# Patient Record
Sex: FEMALE | Race: WHITE | Employment: OTHER | URBAN - METROPOLITAN AREA
[De-identification: names, ages, dates, MRNs, and addresses within clinical notes are randomized per-mention and may not be internally consistent; named-entity substitution may affect disease eponyms.]

---

## 2018-08-29 ENCOUNTER — HOSPITAL ENCOUNTER (OUTPATIENT)
Dept: VASCULAR SURGERY | Age: 68
Discharge: HOME | End: 2018-08-29
Attending: FAMILY MEDICINE
Payer: MEDICARE

## 2018-08-29 DIAGNOSIS — H93.11 NEW ONSET TINNITUS, RIGHT: ICD-10-CM

## 2018-08-29 PROCEDURE — 93880 EXTRACRANIAL BILAT STUDY: CPT

## 2018-08-29 NOTE — PROCEDURES
[facility]  *** FINAL REPORT ***    Name: Jenny Lima  MRN: [id number]    Outpatient  : 1950  HIS Order #: [id number]  [address] [facility] Visit #: [id number]  Date: 29 Aug 2018    TYPE OF TEST: Cerebrovascular Duplex    REASON FOR TEST  Tinnitus    Right Carotid:-             Proximal               Mid                 Distal  cm/s  Systolic  Diastolic  Systolic  Diastolic  Systolic  Diastolic  CCA:     68.7       9.0                            61.0      12.0  Bulb:  ECA:     97.0       0.0  ICA:    321.0      88.0      286.0      55.0       98.0      18.0  ICA/CCA:  5.3       7.3    ICA Stenosis: 70% or greater    Right Vertebral:-  Finding: Antegrade  Sys:       45.0  Jailyn:        0.0    Right Subclavian: Normal    Left Carotid:-            Proximal                Mid                 Distal  cm/s  Systolic  Diastolic  Systolic  Diastolic  Systolic  Diastolic  CCA:     67.7      10.0                            69.0      14.0  Bulb:  ECA:    149.0       0.0  ICA:     67.0      12.0       60.0      11.0       48.0      12.0  ICA/CCA:  1.0       0.9    ICA Stenosis: <50%    Left Vertebral:-  Finding: Antegrade  Sys:       51.0  Jailyn:       13.0    Left Subclavian: Normal    INTERPRETATION/FINDINGS  PROCEDURE:  Carotid Duplex Examination using B-mode, color and  spectral Doppler of the extracranial cerebrovascular arteries. 1. 70% or greater stenosis in the right internal carotid artery. 2. <50% stenosis in the left internal carotid artery. 3. No significant stenosis in the external carotid arteries  bilaterally. 4. Antegrade flow in both vertebral arteries. 5. Normal flow in both subclavian arteries. Plaque Morphology:  1. Calcific plaque in the bulb and right ICA. 2. Calcific plaque in the bulb and left ICA. ADDITIONAL COMMENTS    I have personally reviewed the data relevant to the interpretation of  this  study. TECHNOLOGIST: Bunny Pavon RVT, JACK  Signed: 2018 02:30 PM    PHYSICIAN: Shahriar Bowen MD  Signed: 09/04/2018 01:53 PM

## 2018-08-29 NOTE — PROGRESS NOTES
[id number] [facility]  Preliminary Report:  Carotid Duplex Scan Right: 
Severe plaque noted in the right carotid system. Right ICA velocities suggest greater than or equal to 70% diameter reduction. Right vertebral artery flow is antegrade. Left: 
Mild plaque noted in the left carotid system. Left ICA velocities suggest less than 50% diameter reduction. Left vertebral artery flow is antegrade. Final report to follow.

## 2018-09-24 ENCOUNTER — HOSPITAL ENCOUNTER (OUTPATIENT)
Dept: CT IMAGING | Age: 68
Discharge: HOME | End: 2018-09-24
Attending: SURGERY
Payer: MEDICARE

## 2018-09-24 DIAGNOSIS — I65.23 BILATERAL CAROTID ARTERY OCCLUSION: ICD-10-CM

## 2018-09-24 LAB — CREAT BLD-MCNC: 0.8 MG/DL (ref 0.6–1.3)

## 2018-09-24 PROCEDURE — 70498 CT ANGIOGRAPHY NECK: CPT

## 2018-09-24 PROCEDURE — 74011250636 HC RX REV CODE- 250/636: Performed by: SURGERY

## 2018-09-24 PROCEDURE — 74011636320 HC RX REV CODE- 636/320: Performed by: SURGERY

## 2018-09-24 PROCEDURE — 82565 ASSAY OF CREATININE: CPT

## 2018-09-24 RX ORDER — SODIUM CHLORIDE 0.9 % (FLUSH) 0.9 %
10 SYRINGE (ML) INJECTION
Status: COMPLETED | OUTPATIENT
Start: 2018-09-24 | End: 2018-09-24

## 2018-09-24 RX ORDER — SODIUM CHLORIDE 9 MG/ML
50 INJECTION, SOLUTION INTRAVENOUS
Status: COMPLETED | OUTPATIENT
Start: 2018-09-24 | End: 2018-09-24

## 2018-09-24 RX ADMIN — Medication 10 ML: at 07:18

## 2018-09-24 RX ADMIN — IOPAMIDOL 100 ML: 755 INJECTION, SOLUTION INTRAVENOUS at 07:18

## 2018-09-24 RX ADMIN — SODIUM CHLORIDE 50 ML/HR: 900 INJECTION, SOLUTION INTRAVENOUS at 07:18

## 2018-10-05 ENCOUNTER — HOSPITAL ENCOUNTER (OUTPATIENT)
Dept: GENERAL RADIOLOGY | Age: 68
Discharge: HOME | End: 2018-10-05
Attending: SURGERY
Payer: MEDICARE

## 2018-10-05 ENCOUNTER — HOSPITAL ENCOUNTER (OUTPATIENT)
Dept: PREADMISSION TESTING | Age: 68
Discharge: HOME | End: 2018-10-05
Attending: SURGERY
Payer: MEDICARE

## 2018-10-05 LAB
ABO + RH BLD: NORMAL
ALBUMIN SERPL-MCNC: 4 G/DL (ref 3.5–5)
ALBUMIN/GLOB SERPL: 1.3 {RATIO} (ref 1.1–2.2)
ALP SERPL-CCNC: 76 U/L (ref 45–117)
ALT SERPL-CCNC: 28 U/L (ref 12–78)
ANION GAP SERPL CALC-SCNC: 7 MMOL/L (ref 5–15)
APPEARANCE UR: CLEAR
APTT PPP: 27 SEC (ref 22.1–32)
AST SERPL-CCNC: 18 U/L (ref 15–37)
BACTERIA URNS QL MICRO: NEGATIVE /HPF
BASOPHILS # BLD: 0 K/UL (ref 0–0.1)
BASOPHILS NFR BLD: 0 % (ref 0–1)
BILIRUB SERPL-MCNC: 0.3 MG/DL (ref 0.2–1)
BILIRUB UR QL: NEGATIVE
BLOOD GROUP ANTIBODIES SERPL: NORMAL
BUN SERPL-MCNC: 12 MG/DL (ref 6–20)
BUN/CREAT SERPL: 16 (ref 12–20)
CALCIUM SERPL-MCNC: 9.2 MG/DL (ref 8.5–10.1)
CHLORIDE SERPL-SCNC: 105 MMOL/L (ref 97–108)
CO2 SERPL-SCNC: 27 MMOL/L (ref 21–32)
COLOR UR: NORMAL
CREAT SERPL-MCNC: 0.77 MG/DL (ref 0.55–1.02)
DIFFERENTIAL METHOD BLD: ABNORMAL
EOSINOPHIL # BLD: 0.1 K/UL (ref 0–0.4)
EOSINOPHIL NFR BLD: 1 % (ref 0–7)
EPITH CASTS URNS QL MICRO: NORMAL /LPF
ERYTHROCYTE [DISTWIDTH] IN BLOOD BY AUTOMATED COUNT: 12.8 % (ref 11.5–14.5)
GLOBULIN SER CALC-MCNC: 3.1 G/DL (ref 2–4)
GLUCOSE SERPL-MCNC: 111 MG/DL (ref 65–100)
GLUCOSE UR STRIP.AUTO-MCNC: NEGATIVE MG/DL
HCT VFR BLD AUTO: 42.1 % (ref 35–47)
HGB BLD-MCNC: 14 G/DL (ref 11.5–16)
HGB UR QL STRIP: NEGATIVE
HYALINE CASTS URNS QL MICRO: NORMAL /LPF (ref 0–5)
IMM GRANULOCYTES # BLD: 0 K/UL (ref 0–0.04)
IMM GRANULOCYTES NFR BLD AUTO: 1 % (ref 0–0.5)
INR PPP: 1 (ref 0.9–1.1)
KETONES UR QL STRIP.AUTO: NEGATIVE MG/DL
LEUKOCYTE ESTERASE UR QL STRIP.AUTO: NEGATIVE
LYMPHOCYTES # BLD: 2.7 K/UL (ref 0.8–3.5)
LYMPHOCYTES NFR BLD: 34 % (ref 12–49)
MCH RBC QN AUTO: 28.6 PG (ref 26–34)
MCHC RBC AUTO-ENTMCNC: 33.3 G/DL (ref 30–36.5)
MCV RBC AUTO: 85.9 FL (ref 80–99)
MONOCYTES # BLD: 0.5 K/UL (ref 0–1)
MONOCYTES NFR BLD: 6 % (ref 5–13)
NEUTS SEG # BLD: 4.5 K/UL (ref 1.8–8)
NEUTS SEG NFR BLD: 58 % (ref 32–75)
NITRITE UR QL STRIP.AUTO: NEGATIVE
NRBC # BLD: 0 K/UL (ref 0–0.01)
NRBC BLD-RTO: 0 PER 100 WBC
PH UR STRIP: 5.5 [PH] (ref 5–8)
PLATELET # BLD AUTO: 269 K/UL (ref 150–400)
PMV BLD AUTO: 9.7 FL (ref 8.9–12.9)
POTASSIUM SERPL-SCNC: 4.1 MMOL/L (ref 3.5–5.1)
PROT SERPL-MCNC: 7.1 G/DL (ref 6.4–8.2)
PROT UR STRIP-MCNC: NEGATIVE MG/DL
PROTHROMBIN TIME: 10.1 SEC (ref 9–11.1)
RBC # BLD AUTO: 4.9 M/UL (ref 3.8–5.2)
RBC #/AREA URNS HPF: NORMAL /HPF (ref 0–5)
SODIUM SERPL-SCNC: 139 MMOL/L (ref 136–145)
SP GR UR REFRACTOMETRY: 1 (ref 1–1.03)
SPECIMEN EXP DATE BLD: NORMAL
THERAPEUTIC RANGE,PTTT: NORMAL SECS (ref 58–77)
UA: UC IF INDICATED,UAUC: NORMAL
UROBILINOGEN UR QL STRIP.AUTO: 0.2 EU/DL (ref 0.2–1)
WBC # BLD AUTO: 7.8 K/UL (ref 3.6–11)
WBC URNS QL MICRO: NORMAL /HPF (ref 0–4)

## 2018-10-05 PROCEDURE — 85730 THROMBOPLASTIN TIME PARTIAL: CPT | Performed by: SURGERY

## 2018-10-05 PROCEDURE — 80053 COMPREHEN METABOLIC PANEL: CPT | Performed by: SURGERY

## 2018-10-05 PROCEDURE — 86900 BLOOD TYPING SEROLOGIC ABO: CPT | Performed by: SURGERY

## 2018-10-05 PROCEDURE — 85025 COMPLETE CBC W/AUTO DIFF WBC: CPT | Performed by: SURGERY

## 2018-10-05 PROCEDURE — 71046 X-RAY EXAM CHEST 2 VIEWS: CPT

## 2018-10-05 PROCEDURE — 85610 PROTHROMBIN TIME: CPT | Performed by: SURGERY

## 2018-10-05 PROCEDURE — 81001 URINALYSIS AUTO W/SCOPE: CPT | Performed by: SURGERY

## 2018-10-05 PROCEDURE — 93005 ELECTROCARDIOGRAM TRACING: CPT

## 2018-10-05 PROCEDURE — 36415 COLL VENOUS BLD VENIPUNCTURE: CPT | Performed by: SURGERY

## 2018-10-05 RX ORDER — FLUTICASONE PROPIONATE 50 MCG
2 SPRAY, SUSPENSION (ML) NASAL AS NEEDED
COMMUNITY

## 2018-10-05 RX ORDER — CEFAZOLIN SODIUM/WATER 2 G/20 ML
2 SYRINGE (ML) INTRAVENOUS ONCE
Status: CANCELLED | OUTPATIENT
Start: 2018-10-06 | End: 2018-10-06

## 2018-10-05 RX ORDER — SODIUM CHLORIDE, SODIUM LACTATE, POTASSIUM CHLORIDE, CALCIUM CHLORIDE 600; 310; 30; 20 MG/100ML; MG/100ML; MG/100ML; MG/100ML
25 INJECTION, SOLUTION INTRAVENOUS CONTINUOUS
Status: CANCELLED | OUTPATIENT
Start: 2018-10-11

## 2018-10-05 RX ORDER — ASPIRIN 81 MG/1
81 TABLET ORAL DAILY
COMMUNITY

## 2018-10-05 NOTE — PERIOP NOTES
Incentive Chloé Irwin Using the incentive spirometer helps expand the small air sacs of your lungs, helps you breathe deeply, and helps improve your lung function. Use your incentive spirometer twice a day (10 breaths each time) prior to surgery. How to Use Your Incentive Spirometer: 1. Hold the incentive spirometer in an upright position. 2. Breathe out as usual.  
3. Place the mouthpiece in your mouth and seal your lips tightly around it. 4. Take a deep breath. Breathe in slowly and as deeply as possible. Keep the blue flow rate guide between the arrows. 5. Hold your breath as long as possible. Then exhale slowly and allow the piston to fall to the bottom of the column. 6. Rest for a few seconds and repeat steps one through five at least 10 times. PAT Tidal Volume_____1700_____________  x______10__________  Date_____10/5/18__________________ BRING THE INCENTIVE SPIROMETER WITH YOU TO THE HOSPITAL ON THE DAY OF YOUR SURGERY. Opportunity given to ask and answer questions as well as to observe return demonstration. Patient signature_____________________________    Witness____________________________

## 2018-10-05 NOTE — PERIOP NOTES
[facility] Preoperative Instructions Surgery Date 10/11/18         Time of Arrival 0545 am   Contact # [phone] 1. On the day of your surgery, please report to the Surgical Services Registration Desk and sign in at your designated time. The Surgery Center is located to the right of the Emergency Room. 2. You must have someone with you to drive you home. You should not drive a car for 24 hours following surgery. Please make arrangements for a friend or family member to stay with you for the first 24 hours after your surgery. 3. Do not have anything to eat or drink (including water, gum, mints, coffee, juice) after midnight ??10/10/18    . ? This may not apply to medications prescribed by your physician. ?(Please note below the special instructions with medications to take the morning of your procedure.) 4. We recommend you do not drink any alcoholic beverages for 24 hours before and after your surgery. 5. Contact your surgeons office for instructions on the following medications: non-steroidal anti-inflammatory drugs (i.e. Advil, Aleve), vitamins, and supplements. (Some surgeons will want you to stop these medications prior to surgery and others may allow you to take them) **If you are currently taking Plavix, Coumadin, Aspirin and/or other blood-thinning agents, contact your surgeon for instructions. ** Your surgeon will partner with the physician prescribing these medications to determine if it is safe to stop or if you need to continue taking. Please do not stop taking these medications without instructions from your surgeon 6. Wear comfortable clothes. Wear glasses instead of contacts. Do not bring any money or jewelry. Please bring picture ID, insurance card, and any prearranged co-payment or hospital payment. Do not wear make-up, particularly mascara the morning of your surgery.   Do not wear nail polish, particularly if you are having foot /hand surgery. Wear your hair loose or down, no ponytails, buns, ulysses pins or clips. All body piercings must be removed. Please shower with antibacterial soap for three consecutive days before and on the morning of surgery, but do not apply any lotions, powders or deodorants after the shower on the day of surgery. Please use a fresh towels after each shower. Please sleep in clean clothes and change bed linens the night before surgery. Please do not shave for 48 hours prior to surgery. Shaving of the face is acceptable. 7. You should understand that if you do not follow these instructions your surgery may be cancelled. If your physical condition changes (I.e. fever, cold or flu) please contact your surgeon as soon as possible. 8. It is important that you be on time. If a situation occurs where you may be late, please call [phone] (OR Holding Area). 9. If you have any questions and or problems, please call [phone] (Pre-admission Testing). 10. Your surgery time may be subject to change. You will receive a phone call the evening prior if your time changes. 11.  If having outpatient surgery, you must have someone to drive you here, stay with you during the duration of your stay, and to drive you home at time of discharge. 12.   In an effort to improve the efficiency, privacy, and safety for all of our Pre-op patients visitors are not allowed in the Holding area. Once you arrive and are registered your family/visitors will be asked to remain in the waiting room. The Pre-op staff will get you from the Surgical Waiting Area and will explain to you and your family/visitors that the Pre-op phase is beginning. The staff will answer any questions and provide instructions for tracking of the patient, by use of the existing tracking number and color-coded status board in the waiting room.   At this time the staff will also ask for your designated spokesperson information in the event that the physician or staff need to provide an update or obtain any pertinent information. The designated spokesperson will be notified if the physician needs to speak to family during the pre-operative phase. If at any time your family/visitors has questions or concerns they may approach the volunteer desk in the waiting area for assistance. Special Instructions: Use Incentive Spirometer 20 times daily prior to surgery. MEDICATIONS TO TAKE THE MORNING OF SURGERY WITH A SIP OF WATER: amlodipine, Zyrtec, Flonase if needed, Levothyroxine, Metoprolol I understand a pre-operative phone call will be made to verify my surgery time. In the event that I am not available, I give permission for a message to be left on my answering service and/or with another person? yes 
 
 
 
 ___________________      __________   _________ 
  (Signature of Patient)             (Witness)                (Date and Time)

## 2018-10-06 LAB
ATRIAL RATE: 63 BPM
CALCULATED P AXIS, ECG09: 80 DEGREES
CALCULATED R AXIS, ECG10: -9 DEGREES
CALCULATED T AXIS, ECG11: 105 DEGREES
DIAGNOSIS, 93000: NORMAL
P-R INTERVAL, ECG05: 160 MS
Q-T INTERVAL, ECG07: 390 MS
QRS DURATION, ECG06: 74 MS
QTC CALCULATION (BEZET), ECG08: 399 MS
VENTRICULAR RATE, ECG03: 63 BPM

## 2018-10-08 VITALS
TEMPERATURE: 98.2 F | HEART RATE: 65 BPM | DIASTOLIC BLOOD PRESSURE: 69 MMHG | SYSTOLIC BLOOD PRESSURE: 169 MMHG | HEIGHT: 67 IN | BODY MASS INDEX: 23.94 KG/M2 | OXYGEN SATURATION: 99 % | RESPIRATION RATE: 16 BRPM | WEIGHT: 152.56 LBS

## 2018-10-11 ENCOUNTER — ANESTHESIA EVENT (OUTPATIENT)
Dept: SURGERY | Age: 68
DRG: 039 | End: 2018-10-11
Payer: MEDICARE

## 2018-10-11 ENCOUNTER — ANESTHESIA (OUTPATIENT)
Dept: SURGERY | Age: 68
DRG: 039 | End: 2018-10-11
Payer: MEDICARE

## 2018-10-11 ENCOUNTER — HOSPITAL ENCOUNTER (INPATIENT)
Age: 68
LOS: 1 days | Discharge: HOME | DRG: 039 | End: 2018-10-12
Attending: SURGERY | Admitting: SURGERY
Payer: MEDICARE

## 2018-10-11 PROBLEM — I65.29 CAROTID STENOSIS, ASYMPTOMATIC: Status: ACTIVE | Noted: 2018-10-11

## 2018-10-11 LAB
GLUCOSE BLD STRIP.AUTO-MCNC: 156 MG/DL (ref 65–100)
GLUCOSE BLD STRIP.AUTO-MCNC: 182 MG/DL (ref 65–100)
GLUCOSE BLD STRIP.AUTO-MCNC: 199 MG/DL (ref 65–100)
GLUCOSE BLD STRIP.AUTO-MCNC: 237 MG/DL (ref 65–100)
SERVICE CMNT-IMP: ABNORMAL

## 2018-10-11 PROCEDURE — 74011250637 HC RX REV CODE- 250/637: Performed by: SURGERY

## 2018-10-11 PROCEDURE — 76060000037 HC ANESTHESIA 3 TO 3.5 HR: Performed by: SURGERY

## 2018-10-11 PROCEDURE — 77030006689 HC BLD OPHTH BVR BD -A: Performed by: SURGERY

## 2018-10-11 PROCEDURE — 74011000250 HC RX REV CODE- 250: Performed by: SURGERY

## 2018-10-11 PROCEDURE — 77030002987 HC SUT PROL J&J -B: Performed by: SURGERY

## 2018-10-11 PROCEDURE — 77030014008 HC SPNG HEMSTAT J&J -C: Performed by: SURGERY

## 2018-10-11 PROCEDURE — 77030031139 HC SUT VCRL2 J&J -A: Performed by: SURGERY

## 2018-10-11 PROCEDURE — 76210000019 HC OR PH I REC 4 TO 4.5 HR: Performed by: SURGERY

## 2018-10-11 PROCEDURE — 77030010120 HC SHR COAG HARMO J&J -E: Performed by: SURGERY

## 2018-10-11 PROCEDURE — 65660000000 HC RM CCU STEPDOWN

## 2018-10-11 PROCEDURE — 74011250636 HC RX REV CODE- 250/636

## 2018-10-11 PROCEDURE — 77030013079 HC BLNKT BAIR HGGR 3M -A: Performed by: NURSE ANESTHETIST, CERTIFIED REGISTERED

## 2018-10-11 PROCEDURE — 76010000173 HC OR TIME 3 TO 3.5 HR INTENSV-TIER 1: Performed by: SURGERY

## 2018-10-11 PROCEDURE — 82962 GLUCOSE BLOOD TEST: CPT

## 2018-10-11 PROCEDURE — 74011250636 HC RX REV CODE- 250/636: Performed by: SURGERY

## 2018-10-11 PROCEDURE — 77030002996 HC SUT SLK J&J -A: Performed by: SURGERY

## 2018-10-11 PROCEDURE — 03UK0JZ SUPPLEMENT RIGHT INTERNAL CAROTID ARTERY WITH SYNTHETIC SUBSTITUTE, OPEN APPROACH: ICD-10-PCS | Performed by: SURGERY

## 2018-10-11 PROCEDURE — 74011250636 HC RX REV CODE- 250/636: Performed by: ANESTHESIOLOGY

## 2018-10-11 PROCEDURE — 03CK0Z6 EXTIRPATE MATTER FROM R INT CAROTID, BIFURC, OPEN: ICD-10-PCS | Performed by: SURGERY

## 2018-10-11 PROCEDURE — C1768 GRAFT, VASCULAR: HCPCS | Performed by: SURGERY

## 2018-10-11 PROCEDURE — 77030002916 HC SUT ETHLN J&J -A: Performed by: SURGERY

## 2018-10-11 PROCEDURE — 74011636637 HC RX REV CODE- 636/637: Performed by: SURGERY

## 2018-10-11 PROCEDURE — 77030002924 HC SUT GORTX WLGO -B: Performed by: SURGERY

## 2018-10-11 PROCEDURE — 77030013567 HC DRN WND RESERV BARD -A: Performed by: SURGERY

## 2018-10-11 PROCEDURE — 77010033678 HC OXYGEN DAILY

## 2018-10-11 PROCEDURE — 77030019908 HC STETH ESOPH SIMS -A: Performed by: NURSE ANESTHETIST, CERTIFIED REGISTERED

## 2018-10-11 PROCEDURE — 77030005401 HC CATH RAD ARRO -A: Performed by: NURSE ANESTHETIST, CERTIFIED REGISTERED

## 2018-10-11 PROCEDURE — 77030018836 HC SOL IRR NACL ICUM -A: Performed by: SURGERY

## 2018-10-11 PROCEDURE — 77030011640 HC PAD GRND REM COVD -A: Performed by: SURGERY

## 2018-10-11 PROCEDURE — 74011000258 HC RX REV CODE- 258

## 2018-10-11 PROCEDURE — 77030008684 HC TU ET CUF COVD -B: Performed by: NURSE ANESTHETIST, CERTIFIED REGISTERED

## 2018-10-11 PROCEDURE — 74011000250 HC RX REV CODE- 250

## 2018-10-11 PROCEDURE — 77030020782 HC GWN BAIR PAWS FLX 3M -B

## 2018-10-11 PROCEDURE — 77030020256 HC SOL INJ NACL 0.9%  500ML: Performed by: SURGERY

## 2018-10-11 PROCEDURE — 77030012406 HC DRN WND PENRS BARD -A: Performed by: SURGERY

## 2018-10-11 PROCEDURE — 77030026438 HC STYL ET INTUB CARD -A: Performed by: NURSE ANESTHETIST, CERTIFIED REGISTERED

## 2018-10-11 PROCEDURE — 74011000272 HC RX REV CODE- 272: Performed by: SURGERY

## 2018-10-11 PROCEDURE — 88311 DECALCIFY TISSUE: CPT | Performed by: SURGERY

## 2018-10-11 PROCEDURE — 77030018824 HC SHNT CAR ARGY COVD -B: Performed by: SURGERY

## 2018-10-11 PROCEDURE — 74011636637 HC RX REV CODE- 636/637

## 2018-10-11 PROCEDURE — 88304 TISSUE EXAM BY PATHOLOGIST: CPT | Performed by: SURGERY

## 2018-10-11 PROCEDURE — 77030020153 HC PRB DOPLR DISP MIZU -C: Performed by: SURGERY

## 2018-10-11 PROCEDURE — 77030002933 HC SUT MCRYL J&J -A: Performed by: SURGERY

## 2018-10-11 DEVICE — GRAFT PTCH TW GEL SEAL 8X75MM -- VASCUTEK FLUOROPASSIV: Type: IMPLANTABLE DEVICE | Site: CAROTID | Status: FUNCTIONAL

## 2018-10-11 RX ORDER — ROCURONIUM BROMIDE 10 MG/ML
INJECTION, SOLUTION INTRAVENOUS AS NEEDED
Status: DISCONTINUED | OUTPATIENT
Start: 2018-10-11 | End: 2018-10-11 | Stop reason: HOSPADM

## 2018-10-11 RX ORDER — NICARDIPINE HYDROCHLORIDE 0.2 MG/ML
INJECTION INTRAVENOUS
Status: DISCONTINUED | OUTPATIENT
Start: 2018-10-11 | End: 2018-10-11 | Stop reason: HOSPADM

## 2018-10-11 RX ORDER — PRAVASTATIN SODIUM 40 MG/1
40 TABLET ORAL
Status: DISCONTINUED | OUTPATIENT
Start: 2018-10-11 | End: 2018-10-12 | Stop reason: HOSPADM

## 2018-10-11 RX ORDER — ONDANSETRON 2 MG/ML
4 INJECTION INTRAMUSCULAR; INTRAVENOUS
Status: DISCONTINUED | OUTPATIENT
Start: 2018-10-11 | End: 2018-10-12 | Stop reason: HOSPADM

## 2018-10-11 RX ORDER — FENTANYL CITRATE 50 UG/ML
25 INJECTION, SOLUTION INTRAMUSCULAR; INTRAVENOUS
Status: DISCONTINUED | OUTPATIENT
Start: 2018-10-11 | End: 2018-10-11 | Stop reason: HOSPADM

## 2018-10-11 RX ORDER — HYDROMORPHONE HYDROCHLORIDE 1 MG/ML
.2-.5 INJECTION, SOLUTION INTRAMUSCULAR; INTRAVENOUS; SUBCUTANEOUS
Status: DISCONTINUED | OUTPATIENT
Start: 2018-10-11 | End: 2018-10-11 | Stop reason: HOSPADM

## 2018-10-11 RX ORDER — HEPARIN SODIUM 1000 [USP'U]/ML
INJECTION, SOLUTION INTRAVENOUS; SUBCUTANEOUS AS NEEDED
Status: DISCONTINUED | OUTPATIENT
Start: 2018-10-11 | End: 2018-10-11 | Stop reason: HOSPADM

## 2018-10-11 RX ORDER — SODIUM CHLORIDE 0.9 % (FLUSH) 0.9 %
5-10 SYRINGE (ML) INJECTION AS NEEDED
Status: DISCONTINUED | OUTPATIENT
Start: 2018-10-11 | End: 2018-10-11 | Stop reason: HOSPADM

## 2018-10-11 RX ORDER — DEXAMETHASONE SODIUM PHOSPHATE 4 MG/ML
INJECTION, SOLUTION INTRA-ARTICULAR; INTRALESIONAL; INTRAMUSCULAR; INTRAVENOUS; SOFT TISSUE AS NEEDED
Status: DISCONTINUED | OUTPATIENT
Start: 2018-10-11 | End: 2018-10-11 | Stop reason: HOSPADM

## 2018-10-11 RX ORDER — SODIUM CHLORIDE, SODIUM LACTATE, POTASSIUM CHLORIDE, CALCIUM CHLORIDE 600; 310; 30; 20 MG/100ML; MG/100ML; MG/100ML; MG/100ML
25 INJECTION, SOLUTION INTRAVENOUS CONTINUOUS
Status: DISCONTINUED | OUTPATIENT
Start: 2018-10-11 | End: 2018-10-11 | Stop reason: HOSPADM

## 2018-10-11 RX ORDER — INSULIN LISPRO 100 [IU]/ML
INJECTION, SOLUTION INTRAVENOUS; SUBCUTANEOUS
Status: DISCONTINUED | OUTPATIENT
Start: 2018-10-11 | End: 2018-10-12 | Stop reason: HOSPADM

## 2018-10-11 RX ORDER — ASPIRIN 81 MG/1
81 TABLET ORAL 2 TIMES DAILY
Status: DISCONTINUED | OUTPATIENT
Start: 2018-10-11 | End: 2018-10-12 | Stop reason: HOSPADM

## 2018-10-11 RX ORDER — ADHESIVE BANDAGE
30 BANDAGE TOPICAL DAILY PRN
Status: DISCONTINUED | OUTPATIENT
Start: 2018-10-11 | End: 2018-10-12 | Stop reason: HOSPADM

## 2018-10-11 RX ORDER — SODIUM CHLORIDE, SODIUM LACTATE, POTASSIUM CHLORIDE, CALCIUM CHLORIDE 600; 310; 30; 20 MG/100ML; MG/100ML; MG/100ML; MG/100ML
INJECTION, SOLUTION INTRAVENOUS
Status: DISCONTINUED | OUTPATIENT
Start: 2018-10-11 | End: 2018-10-11 | Stop reason: HOSPADM

## 2018-10-11 RX ORDER — SODIUM CHLORIDE 9 MG/ML
50 INJECTION, SOLUTION INTRAVENOUS CONTINUOUS
Status: DISCONTINUED | OUTPATIENT
Start: 2018-10-11 | End: 2018-10-12

## 2018-10-11 RX ORDER — MORPHINE SULFATE 10 MG/ML
2 INJECTION, SOLUTION INTRAMUSCULAR; INTRAVENOUS
Status: DISCONTINUED | OUTPATIENT
Start: 2018-10-11 | End: 2018-10-11 | Stop reason: HOSPADM

## 2018-10-11 RX ORDER — MIDAZOLAM HYDROCHLORIDE 1 MG/ML
INJECTION, SOLUTION INTRAMUSCULAR; INTRAVENOUS AS NEEDED
Status: DISCONTINUED | OUTPATIENT
Start: 2018-10-11 | End: 2018-10-11 | Stop reason: HOSPADM

## 2018-10-11 RX ORDER — SODIUM CHLORIDE 0.9 % (FLUSH) 0.9 %
5-10 SYRINGE (ML) INJECTION EVERY 8 HOURS
Status: DISCONTINUED | OUTPATIENT
Start: 2018-10-11 | End: 2018-10-11 | Stop reason: HOSPADM

## 2018-10-11 RX ORDER — METOPROLOL TARTRATE 25 MG/1
25 TABLET, FILM COATED ORAL EVERY 12 HOURS
Status: DISCONTINUED | OUTPATIENT
Start: 2018-10-11 | End: 2018-10-12 | Stop reason: HOSPADM

## 2018-10-11 RX ORDER — FACIAL-BODY WIPES
10 EACH TOPICAL DAILY PRN
Status: DISCONTINUED | OUTPATIENT
Start: 2018-10-11 | End: 2018-10-12 | Stop reason: HOSPADM

## 2018-10-11 RX ORDER — PROPOFOL 10 MG/ML
INJECTION, EMULSION INTRAVENOUS AS NEEDED
Status: DISCONTINUED | OUTPATIENT
Start: 2018-10-11 | End: 2018-10-11 | Stop reason: HOSPADM

## 2018-10-11 RX ORDER — METFORMIN HYDROCHLORIDE 500 MG/1
1000 TABLET ORAL 2 TIMES DAILY WITH MEALS
Status: DISCONTINUED | OUTPATIENT
Start: 2018-10-11 | End: 2018-10-12 | Stop reason: HOSPADM

## 2018-10-11 RX ORDER — ONDANSETRON 2 MG/ML
INJECTION INTRAMUSCULAR; INTRAVENOUS AS NEEDED
Status: DISCONTINUED | OUTPATIENT
Start: 2018-10-11 | End: 2018-10-11 | Stop reason: HOSPADM

## 2018-10-11 RX ORDER — LIDOCAINE HYDROCHLORIDE 20 MG/ML
INJECTION, SOLUTION EPIDURAL; INFILTRATION; INTRACAUDAL; PERINEURAL AS NEEDED
Status: DISCONTINUED | OUTPATIENT
Start: 2018-10-11 | End: 2018-10-11 | Stop reason: HOSPADM

## 2018-10-11 RX ORDER — METOPROLOL TARTRATE 5 MG/5ML
INJECTION INTRAVENOUS AS NEEDED
Status: DISCONTINUED | OUTPATIENT
Start: 2018-10-11 | End: 2018-10-11 | Stop reason: HOSPADM

## 2018-10-11 RX ORDER — DEXTROSE 50 % IN WATER (D50W) INTRAVENOUS SYRINGE
12.5-25 AS NEEDED
Status: DISCONTINUED | OUTPATIENT
Start: 2018-10-11 | End: 2018-10-12 | Stop reason: HOSPADM

## 2018-10-11 RX ORDER — MORPHINE SULFATE 2 MG/ML
2 INJECTION, SOLUTION INTRAMUSCULAR; INTRAVENOUS
Status: DISCONTINUED | OUTPATIENT
Start: 2018-10-11 | End: 2018-10-12

## 2018-10-11 RX ORDER — FENTANYL CITRATE 50 UG/ML
INJECTION, SOLUTION INTRAMUSCULAR; INTRAVENOUS AS NEEDED
Status: DISCONTINUED | OUTPATIENT
Start: 2018-10-11 | End: 2018-10-11 | Stop reason: HOSPADM

## 2018-10-11 RX ORDER — SUCCINYLCHOLINE CHLORIDE 20 MG/ML
INJECTION INTRAMUSCULAR; INTRAVENOUS AS NEEDED
Status: DISCONTINUED | OUTPATIENT
Start: 2018-10-11 | End: 2018-10-11 | Stop reason: HOSPADM

## 2018-10-11 RX ORDER — CETIRIZINE HCL 10 MG
10 TABLET ORAL DAILY
Status: DISCONTINUED | OUTPATIENT
Start: 2018-10-12 | End: 2018-10-12 | Stop reason: HOSPADM

## 2018-10-11 RX ORDER — DIPHENHYDRAMINE HYDROCHLORIDE 50 MG/ML
12.5 INJECTION, SOLUTION INTRAMUSCULAR; INTRAVENOUS AS NEEDED
Status: DISCONTINUED | OUTPATIENT
Start: 2018-10-11 | End: 2018-10-11 | Stop reason: HOSPADM

## 2018-10-11 RX ORDER — AMLODIPINE BESYLATE 5 MG/1
10 TABLET ORAL DAILY
Status: DISCONTINUED | OUTPATIENT
Start: 2018-10-11 | End: 2018-10-12 | Stop reason: HOSPADM

## 2018-10-11 RX ORDER — MAGNESIUM SULFATE 100 %
4 CRYSTALS MISCELLANEOUS AS NEEDED
Status: DISCONTINUED | OUTPATIENT
Start: 2018-10-11 | End: 2018-10-12 | Stop reason: HOSPADM

## 2018-10-11 RX ORDER — GLYCOPYRROLATE 0.2 MG/ML
INJECTION INTRAMUSCULAR; INTRAVENOUS AS NEEDED
Status: DISCONTINUED | OUTPATIENT
Start: 2018-10-11 | End: 2018-10-11 | Stop reason: HOSPADM

## 2018-10-11 RX ORDER — CEFAZOLIN SODIUM/WATER 2 G/20 ML
2 SYRINGE (ML) INTRAVENOUS ONCE
Status: COMPLETED | OUTPATIENT
Start: 2018-10-11 | End: 2018-10-11

## 2018-10-11 RX ORDER — INSULIN LISPRO 100 [IU]/ML
INJECTION, SOLUTION INTRAVENOUS; SUBCUTANEOUS
Status: COMPLETED
Start: 2018-10-11 | End: 2018-10-11

## 2018-10-11 RX ORDER — ONDANSETRON 2 MG/ML
4 INJECTION INTRAMUSCULAR; INTRAVENOUS AS NEEDED
Status: DISCONTINUED | OUTPATIENT
Start: 2018-10-11 | End: 2018-10-11 | Stop reason: HOSPADM

## 2018-10-11 RX ORDER — OXYCODONE AND ACETAMINOPHEN 5; 325 MG/1; MG/1
1 TABLET ORAL
Status: DISCONTINUED | OUTPATIENT
Start: 2018-10-11 | End: 2018-10-12

## 2018-10-11 RX ADMIN — DEXAMETHASONE SODIUM PHOSPHATE 4 MG: 4 INJECTION, SOLUTION INTRA-ARTICULAR; INTRALESIONAL; INTRAMUSCULAR; INTRAVENOUS; SOFT TISSUE at 08:40

## 2018-10-11 RX ADMIN — MIDAZOLAM HYDROCHLORIDE 2 MG: 1 INJECTION, SOLUTION INTRAMUSCULAR; INTRAVENOUS at 07:24

## 2018-10-11 RX ADMIN — METOPROLOL TARTRATE 1 MG: 5 INJECTION INTRAVENOUS at 10:10

## 2018-10-11 RX ADMIN — PROPOFOL 20 MG: 10 INJECTION, EMULSION INTRAVENOUS at 08:59

## 2018-10-11 RX ADMIN — HEPARIN SODIUM 2000 UNITS: 1000 INJECTION, SOLUTION INTRAVENOUS; SUBCUTANEOUS at 08:58

## 2018-10-11 RX ADMIN — ROCURONIUM BROMIDE 40 MG: 10 INJECTION, SOLUTION INTRAVENOUS at 07:50

## 2018-10-11 RX ADMIN — PRAVASTATIN SODIUM 40 MG: 40 TABLET ORAL at 21:59

## 2018-10-11 RX ADMIN — METOPROLOL TARTRATE 25 MG: 25 TABLET ORAL at 13:44

## 2018-10-11 RX ADMIN — SODIUM CHLORIDE, SODIUM LACTATE, POTASSIUM CHLORIDE, CALCIUM CHLORIDE: 600; 310; 30; 20 INJECTION, SOLUTION INTRAVENOUS at 10:25

## 2018-10-11 RX ADMIN — ONDANSETRON 4 MG: 2 INJECTION, SOLUTION INTRAMUSCULAR; INTRAVENOUS at 13:01

## 2018-10-11 RX ADMIN — FENTANYL CITRATE 50 MCG: 50 INJECTION, SOLUTION INTRAMUSCULAR; INTRAVENOUS at 08:02

## 2018-10-11 RX ADMIN — ROCURONIUM BROMIDE 10 MG: 10 INJECTION, SOLUTION INTRAVENOUS at 08:30

## 2018-10-11 RX ADMIN — METOPROLOL TARTRATE 1 MG: 5 INJECTION INTRAVENOUS at 10:17

## 2018-10-11 RX ADMIN — METOPROLOL TARTRATE 25 MG: 25 TABLET ORAL at 21:59

## 2018-10-11 RX ADMIN — NICARDIPINE HYDROCHLORIDE 5 MG/HR: 0.2 INJECTION INTRAVENOUS at 08:58

## 2018-10-11 RX ADMIN — LIDOCAINE HYDROCHLORIDE 80 MG: 20 INJECTION, SOLUTION EPIDURAL; INFILTRATION; INTRACAUDAL; PERINEURAL at 07:40

## 2018-10-11 RX ADMIN — PROPOFOL 20 MG: 10 INJECTION, EMULSION INTRAVENOUS at 08:02

## 2018-10-11 RX ADMIN — PROPOFOL 20 MG: 10 INJECTION, EMULSION INTRAVENOUS at 08:04

## 2018-10-11 RX ADMIN — INSULIN LISPRO 2 UNITS: 100 INJECTION, SOLUTION INTRAVENOUS; SUBCUTANEOUS at 11:29

## 2018-10-11 RX ADMIN — ASPIRIN 81 MG: 81 TABLET, COATED ORAL at 13:45

## 2018-10-11 RX ADMIN — SUCCINYLCHOLINE CHLORIDE 120 MG: 20 INJECTION INTRAMUSCULAR; INTRAVENOUS at 07:41

## 2018-10-11 RX ADMIN — HEPARIN SODIUM 6000 UNITS: 1000 INJECTION, SOLUTION INTRAVENOUS; SUBCUTANEOUS at 08:30

## 2018-10-11 RX ADMIN — SODIUM CHLORIDE, SODIUM LACTATE, POTASSIUM CHLORIDE, CALCIUM CHLORIDE: 600; 310; 30; 20 INJECTION, SOLUTION INTRAVENOUS at 07:26

## 2018-10-11 RX ADMIN — Medication 2 G: at 07:50

## 2018-10-11 RX ADMIN — GLYCOPYRROLATE 0.1 MG: 0.2 INJECTION INTRAMUSCULAR; INTRAVENOUS at 08:33

## 2018-10-11 RX ADMIN — FENTANYL CITRATE 50 MCG: 50 INJECTION, SOLUTION INTRAMUSCULAR; INTRAVENOUS at 07:28

## 2018-10-11 RX ADMIN — METFORMIN HYDROCHLORIDE 1000 MG: 500 TABLET, FILM COATED ORAL at 17:47

## 2018-10-11 RX ADMIN — AMLODIPINE BESYLATE 10 MG: 5 TABLET ORAL at 13:44

## 2018-10-11 RX ADMIN — INSULIN LISPRO 3 UNITS: 100 INJECTION, SOLUTION INTRAVENOUS; SUBCUTANEOUS at 17:48

## 2018-10-11 RX ADMIN — PROPOFOL 160 MG: 10 INJECTION, EMULSION INTRAVENOUS at 07:40

## 2018-10-11 RX ADMIN — PROPOFOL 30 MG: 10 INJECTION, EMULSION INTRAVENOUS at 09:27

## 2018-10-11 RX ADMIN — ASPIRIN 81 MG: 81 TABLET, COATED ORAL at 17:47

## 2018-10-11 RX ADMIN — SODIUM CHLORIDE, SODIUM LACTATE, POTASSIUM CHLORIDE, AND CALCIUM CHLORIDE 25 ML/HR: 600; 310; 30; 20 INJECTION, SOLUTION INTRAVENOUS at 06:58

## 2018-10-11 RX ADMIN — GLYCOPYRROLATE 0.1 MG: 0.2 INJECTION INTRAMUSCULAR; INTRAVENOUS at 08:31

## 2018-10-11 RX ADMIN — SODIUM CHLORIDE 50 ML/HR: 900 INJECTION, SOLUTION INTRAVENOUS at 11:10

## 2018-10-11 RX ADMIN — ONDANSETRON 4 MG: 2 INJECTION INTRAMUSCULAR; INTRAVENOUS at 10:08

## 2018-10-11 NOTE — ANESTHESIA POSTPROCEDURE EVALUATION
Post-Anesthesia Evaluation and Assessment Patient: Virginia Fritz MRN: [id number]  SSN: [id number] YOB: 1950  Age: 75 y.o. Sex: female Cardiovascular Function/Vital Signs Visit Vitals  /59 (BP 1 Location: Right arm, BP Patient Position: At rest)  Pulse 88  Temp 36.5 °C (97.7 °F)  Resp 17  Ht 5' 6.5\" (1.689 m)  Wt 68.1 kg (150 lb 2.1 oz)  SpO2 94%  BMI 23.87 kg/m2 Patient is status post general anesthesia for Procedure(s): RIGHT CAROTID ARTERY ENDARTERECTOMY. Nausea/Vomiting: None Postoperative hydration reviewed and adequate. Pain: 
Pain Scale 1: FLACC (10/11/18 1145) Pain Intensity 1: 0 (10/11/18 0645) Managed Neurological Status:  
Neuro (WDL): Exceptions to WDL (10/11/18 1038) Neuro Neurologic State: Drowsy;Sleeping (10/11/18 1038) Orientation Level: Oriented to person;Oriented to place;Oriented to situation (10/11/18 1038) Cognition: Follows commands (10/11/18 1038) Speech: Clear (10/11/18 1038) LUE Motor Response: Purposeful (10/11/18 1038) LLE Motor Response: Purposeful (10/11/18 1038) RUE Motor Response: Purposeful (10/11/18 1038) RLE Motor Response: Purposeful (10/11/18 1038) At baseline Mental Status and Level of Consciousness: Arousable Pulmonary Status:  
O2 Device: Nasal cannula (10/11/18 1145) Adequate oxygenation and airway patent Complications related to anesthesia: None Post-anesthesia assessment completed. No concerns Signed By: Aranza Pedro MD   
 October 11, 2018

## 2018-10-11 NOTE — IP AVS SNAPSHOT
[address] [facility] 
[phone] Patient: Mai Case MRN: [id number] :1950 About your hospitalization You were admitted on:  2018 You last received care in the:  [facility] 2 PROGRESSIVE CARE You were discharged on:  2018 Why you were hospitalized Your primary diagnosis was:  Not on File Your diagnoses also included:  Carotid Stenosis, Asymptomatic Follow-up Information Follow up With Details Comments Contact Info Varsha Torres MD Go on 10/19/2018 For hospital follow up appointment at 10:45AM  [address] [address] 
[phone] Gail Singer MD In 2 weeks  [address] [place] [address] 
[phone] Discharge Orders None A check jeremy indicates which time of day the medication should be taken. My Medications START taking these medications Instructions Each Dose to Equal  
 Morning Noon Evening Bedtime  
 acetaminophen 325 mg tablet Commonly known as:  TYLENOL Your last dose was: Your next dose is: Take 2 Tabs by mouth every four (4) hours as needed. 650 mg CONTINUE taking these medications Instructions Each Dose to Equal  
 Morning Noon Evening Bedtime  
 amLODIPine 10 mg tablet Commonly known as:  Oliver Miramontes Your last dose was: Your next dose is: Take  by mouth daily. aspirin delayed-release 81 mg tablet Your last dose was: Your next dose is: Take 81 mg by mouth two (2) times a day. 81 mg  
    
   
   
   
  
 FLONASE ALLERGY RELIEF 50 mcg/actuation nasal spray Generic drug:  fluticasone Your last dose was: Your next dose is: 2 Sprays by Both Nostrils route as needed for Rhinitis. 2 [employer] JANUVIA 100 mg tablet Generic drug:  SITagliptin Your last dose was: Your next dose is: Take 100 mg by mouth every evening. 100 mg  
    
   
   
   
  
 lovastatin 40 mg tablet Commonly known as:  MEVACOR Your last dose was: Your next dose is: Take 40 mg by mouth nightly. 40 mg  
    
   
   
   
  
 metFORMIN 500 mg tablet Commonly known as:  GLUCOPHAGE Your last dose was: Your next dose is: Take 1,000 mg by mouth two (2) times daily (with meals). 1000 mg  
    
   
   
   
  
 metoprolol tartrate 25 mg tablet Commonly known as:  LOPRESSOR Your last dose was: Your next dose is: Take  by mouth two (2) times a day. SYNTHROID 137 mcg tablet Generic drug:  levothyroxine Your last dose was: Your next dose is: Take  by mouth Daily (before breakfast). ZyrTEC 10 mg Cap Generic drug:  Cetirizine Your last dose was: Your next dose is: Take  by mouth daily. STOP taking these medications   
 ibuprofen 600 mg tablet Commonly known as:  MOTRIN Where to Get Your Medications Information on where to get these meds will be given to you by the nurse or doctor. ! Ask your nurse or doctor about these medications  
  acetaminophen 325 mg tablet Discharge Instructions You may shower on Sunday. Dry the wound carefully. The dressing can be removed if there is no drainage, or changed and kept in place for comfort. Do not attempt to drive a car until you are comfortable and NOT taking pain medication. No heavy lifting (3 lbs limit). Mild exercise and light duties around the house are OK. Call the office at [phone] if you have any problems. Marycarmen Announcement We are excited to announce that we are making your provider's discharge notes available to you in Yobble. You will see these notes when they are completed and signed by the physician that discharged you from your recent hospital stay. If you have any questions or concerns about any information you see in Yobble, please call the Health Information Department where you were seen or reach out to your Primary Care Provider for more information about your plan of care. Introducing [facility] [place] [facility]! Berenice Seen introduces Yobble patient portal. Now you can access parts of your medical record, email your doctor's office, and request medication refills online. 1. In your internet browser, go to [email or web address] 2. Click on the First Time User? Click Here link in the Sign In box. You will see the New Member Sign Up page. 3. Enter your Yobble Access Code exactly as it appears below. You will not need to use this code after youve completed the sign-up process. If you do not sign up before the expiration date, you must request a new code. · Yobble Access Code: [email or web address] Expires: 11/19/2018  2:52 PM 
 
4. Enter the last four digits of your Social Security Number (xxxx) and Date of Birth (mm/dd/yyyy) as indicated and click Submit. You will be taken to the next sign-up page. 5. Create a Yobble ID. This will be your Yobble login ID and cannot be changed, so think of one that is secure and easy to remember. 6. Create a Yobble password. You can change your password at any time. 7. Enter your Password Reset Question and Answer. This can be used at a later time if you forget your password. 8. Enter your e-mail address. You will receive e-mail notification when new information is available in [address] 9. Click Sign Up. You can now view and download portions of your medical record.  
10. Click the Download Summary menu link to download a portable copy of your medical information. If you have questions, please visit the Frequently Asked Questions section of the [employer] Software[employer] website. Remember, [employer] is NOT to be used for urgent needs. For medical emergencies, dial 911. Now available from your iPhone and Android! Introducing Faustino Farley As a [facility] [employer] [facility] patient, I wanted to make you aware of our electronic visit tool called Faustino Farley. [employer] allows you to connect within minutes with a medical provider 24 hours a day, seven days a week via a mobile device or tablet or logging into a secure website from your computer. You can access Faustino Farley from anywhere in the [place]. A virtual visit might be right for you when you have a simple condition and feel like you just dont want to get out of bed, or cant get away from work for an appointment, when your regular [facility] provider is not available (evenings, weekends or holidays), or when youre out of town and need minor care. Electronic visits cost only $49 and if the [employer] La Torre[employer]umb[employer]geolad provider determines a prescription is needed to treat your condition, one can be electronically transmitted to a nearby pharmacy*. Please take a moment to enroll today if you have not already done so. The enrollment process is free and takes just a few minutes. To enroll, please download the [employer] phillip to your tablet or phone, or visit [email or web address] to enroll on your computer. And, as an [address] [place] [address] patient with a Enkia account, the results of your visits will be scanned into your electronic medical record and your primary care provider will be able to view the scanned results. We urge you to continue to see your regular [facility] provider for your ongoing medical care.   And while your primary care provider may not be the one available when you seek a Faustino Farley virtual visit, the peace of mind you get from getting a real diagnosis real time can be priceless. For more information on Faustino Farley, view our Frequently Asked Questions (FAQs) at [email or web address]. Sincerely, 
 
Fide Gracia MD 
Chief Medical Officer 50Christy Braun *:  certain medications cannot be prescribed via Faustino Farley Providers Seen During Your Hospitalization Provider Specialty Primary office phone Thad Arriola MD Vascular Surgery [phone] Your Primary Care Physician (PCP) Primary Care Physician Office Phone Office Fax Debbie Schultzjohann [phone] You are allergic to the following Allergen Reactions Beta-Blockers (Beta-Adrenergic Blocking Agts) Swelling Patient was told that she swelled from Beta blockers Codeine Nausea and Vomiting Recent Documentation Height Weight BMI OB Status Smoking Status 1.689 m 68.2 kg 23.91 kg/m2 Postmenopausal Never Smoker Emergency Contacts Name Discharge Info Relation Home Work Mobile [facility] DISCHARGE CAREGIVER [3] Brother [24] [phone] Patient Belongings The following personal items are in your possession at time of discharge: 
  Dental Appliances: None  Visual Aid: At bedside, Glasses, With patient      Home Medications: None   Jewelry: None  Clothing:  (clothing and shoes)    Other Valuables: None  Personal Items Sent to Safe: declined Please provide this summary of care documentation to your next provider. Signatures-by signing, you are acknowledging that this After Visit Summary has been reviewed with you and you have received a copy. Patient Signature:  ____________________________________________________________ Date:  ____________________________________________________________  
  
Joellen Buys  Provider Signature: ____________________________________________________________ Date:  ____________________________________________________________

## 2018-10-11 NOTE — PERIOP NOTES
Handoff Report from Operating Room to PACU    Report received from [facility] and  [facility] regarding Gualberto Armendariz. Surgeon(s): Sulma Mcfadden MD  And Procedure(s) (LRB):  RIGHT CAROTID ARTERY ENDARTERECTOMY (Right)  confirmed   with drains and dressings discussed. Anesthesia type, drugs, patient history, complications, estimated blood loss, vital signs, intake and output, and last pain medication, lines and temperature were reviewed.

## 2018-10-11 NOTE — ANESTHESIA PROCEDURE NOTES
Arterial Line Placement Performed by: Dexter Dozier Authorized by: Dexter Dozier Pre-Procedure Indications:  Arterial pressure monitoring Preanesthetic Checklist: patient identified, risks and benefits discussed, anesthesia consent, site marked, patient being monitored, timeout performed and patient being monitored Procedure:  
Prep:  Chlorhexidine Seldinger Technique?: Yes Orientation:  Left Catheter size:  20 G Number of attempts:  1 Cont Cardiac Output Sensor: No   
 
Assessment:  
Post-procedure:  Line secured and sterile dressing applied Patient Tolerance:  Patient tolerated the procedure well with no immediate complications Comment:  
Risks, benefits, alternatives explained and patient agrees to proceed. Sterile prep with Chlorhexidine. 0.5 mL 1% Lidocaine placed at insertion site.

## 2018-10-11 NOTE — OP NOTES
[facility]  OPERATIVE REPORT    Juan F Klein  MR#: [id number]  : 1950  ACCOUNT #: [de-identified]   DATE OF SERVICE: 10/11/2018    SURGEON:  ISAIAS Ribeiro MD    ASSISTANT:  None. PREOPERATIVE DIAGNOSIS:  Asymptomatic severe right carotid stenosis. POSTOPERATIVE DIAGNOSIS:  Asymptomatic severe right carotid stenosis. PROCEDURE PERFORMED:  Right carotid endarterectomy with Dacron patch. ANESTHESIA:  General.    ESTIMATED BLOOD LOSS:  500 mL. SPECIMENS REMOVED:  Carotid plaque. IMPLANTS:  Dacron patch. DRAINS:  #10 Donald-Doe. COMPLICATIONS:  None. INDICATIONS:  The patient is a 68-year-old female with an 80% asymptomatic right internal carotid artery stenosis. She does also have pulsatile tinnitus. She presents for endarterectomy for stroke risk reduction. PROCEDURE:  After informed consent was obtained, the patient was given intravenous antibiotics within 1 hour of the incision. She was taken to the operating room and after induction of adequate general anesthesia, the right neck was prepped and draped as a sterile field. An incision was made along the anterior border of the sternomastoid muscle and the platysma was divided with electrocautery. Dissection was carried down to the carotid sheath and the common facial vein was divided between silk ties. The internal jugular vein and the sternomastoid muscle were retracted laterally. The vagus nerve was identified in its usual posterolateral position and protected from injury. The distal common carotid artery was dissected free and encircled with a vessel loop. Dissection was carried cephalad and the carotid bifurcation was exposed. There was obvious bulky calcified plaque within the carotid bifurcation and proximal internal carotid artery. Dissection was carried cephalad and the internal carotid artery was dissected free beyond any visible disease.   The hypoglossal nerve was visualized and protected from injury. The internal carotid artery was encircled with a vessel loop. The origin of the external carotid was encircled with a vessel loop and the patient was systemically heparinized. The internal carotid was occluded, followed by the external and the common carotid. A longitudinal arteriotomy was made extending from the distal common carotid artery through the carotid bifurcation and through severe stenosis with heterogeneous plaque in the proximal internal carotid. The arteriotomy was extended beyond the significant disease. There was poor backbleeding from the internal carotid and a #10 [place] shunt was placed without difficulty. An endarterectomy plane was developed in the distal common carotid artery, proximal to any significant disease, and the plaque was transected with Monroy scissors. There was an area of intraplaque hemorrhage in the distal common carotid artery, really below the main plaque in the carotid bulb and proximal ICA. This was all incorporated with the specimen. The endarterectomy was continued cephalad and an eversion endarterectomy was performed on the external carotid. The plaque was elevated through the proximal internal carotid and beyond the main bulk of plaque, there was a tongue of posterior plaque that extended very far cephalad. This required placing a Ney shunt clamp and removing the vessel loop and extending the arteriotomy a good bit more cephalad. The plaque was elevated beyond the cephalad extent of that tongue of plaque and it feathered off to a good endpoint. All loose debris was removed from the lumen of the artery. There was an area at the end point of the endarterectomy in the mid internal carotid artery where posteriorly it seemed to be normal intima lifted up a little bit and so 2 separate 7-0 Prolene tacking sutures were placed. The long arteriotomy was closed with a Dacron patch using running 5-0 [place]Jonny suture.   Prior to completing the patch closure, the shunt was removed and flushing maneuvers were performed. Patch closure was completed and flow was restored first to the external carotid and then to the internal carotid. There was significant bleeding at the cephalad extent of the patch. An attempt was made to control this with 2 separate 6-0 Prolene sutures, but there was still significant bleeding. Because of this, a figure-of-eight suture using 5-0 Prolene with double pledgets was placed. There was still some bleeding after this and final hemostasis was achieved with placement of 1 more figure-of-eight 6-0 Prolene suture using the 2 existing pledgets with that stitch. This bleeding at the cephalad endpoint accounted for the majority of blood loss in the case. The internal carotid was inspected and visually, there was no evidence of narrowing from the repair. The internal carotid was interrogated with a handheld Doppler and had a normal low   resistance signal.  The external carotid had a normal signal.  The suture line was now completely hemostatic. The neck wound was irrigated copiously with antibiotic irrigation and a routine #10 Donald-Doe drain was placed through a separate stab incision at the base of the neck and secured with a 3-0 nylon suture. The platysma was closed with 3-0 Vicryl suture and the skin with 4-0 Monocryl subcuticular suture. Dry dressing was applied. Patient was extubated and transferred to the PACU in stable condition. All counts were correct.       MD LALITA García / ALEC  D: 10/11/2018 11:06     T: 10/11/2018 13:07  JOB #: [id number]  CC: Cornelious Leventhal MD

## 2018-10-11 NOTE — BRIEF OP NOTE
BRIEF OPERATIVE NOTE    Date of Procedure: 10/11/2018   Preoperative Diagnosis: RIGHT CAROTID STENOSIS  Postoperative Diagnosis: RIGHT CAROTID STENOSIS    Procedure(s):  RIGHT CAROTID ARTERY ENDARTERECTOMY  Surgeon(s) and Role:     * Thad Arriola MD - Primary         Surgical Assistant: None    Surgical Staff:  Circ-1: Jennifer Adames  Circ-2: George Campos RN  Scrub Tech-1: Chidi Hannah  Scrub RN-1: Odalis Mckenna RN  Surg Asst-1: Frederick Chin  Event Time In   Incision Start 0800   Incision Close 1027     Anesthesia: General   Estimated Blood Loss: 500 cc  Specimens:   ID Type Source Tests Collected by Time Destination   1 : RIGHT CAROTID ARTERY PLAQUE Preservative Artery  Thad Arriola MD 10/11/2018 1202 Pathology      Findings: Severe stenosis w/ posterior tounge of plaque extending far cephalad. Bleeding at top of patch required repair sutures w/ pledgets. Complications: None  Implants:   Implant Name Type Inv.  Item Serial No.  Lot No. LRB No. Used Action   GRAFT PTCH TW GEL SEAL [id number] -- Ruth [place]   GRAFT PTCH TW GEL SEAL [id number] -- [employer] [id number] [facility] [id number] Right 1 Implanted

## 2018-10-11 NOTE — PERIOP NOTES
TRANSFER - OUT REPORT:    Verbal report given to OLIVE Dorsey(name) on Cecilia Villarreal  being transferred to U 2246(unit) for routine progression of care       Report consisted of patients Situation, Background, Assessment and   Recommendations(SBAR). Information from the following report(s) SBAR was reviewed with the receiving nurse. Lines:   Peripheral IV 10/11/18 Left Hand (Active)   Site Assessment Clean, dry, & intact 10/11/2018 10:38 AM   Phlebitis Assessment 0 10/11/2018 10:38 AM   Infiltration Assessment 0 10/11/2018 10:38 AM   Dressing Status Clean, dry, & intact 10/11/2018 10:38 AM   Dressing Type Tape;Transparent 10/11/2018 10:38 AM   Hub Color/Line Status Green 10/11/2018 10:38 AM        Opportunity for questions and clarification was provided.       Patient transported with:   Monitor  O2 @ 2 liters  Registered Nurse

## 2018-10-11 NOTE — ANESTHESIA PREPROCEDURE EVALUATION
Anesthetic History No history of anesthetic complications Review of Systems / Medical History Patient summary reviewed, nursing notes reviewed and pertinent labs reviewed Pulmonary Within defined limits Neuro/Psych Within defined limits Cardiovascular Hypertension CAD, PAD and CABG Exercise tolerance: >4 METS Comments: Pt takes BB, so she is NOT allergic to BB  
GI/Hepatic/Renal 
Within defined limits Endo/Other Diabetes Hypothyroidism Arthritis Other Findings Physical Exam 
 
Airway Mallampati: II 
TM Distance: 4 - 6 cm Neck ROM: normal range of motion Mouth opening: Normal 
 
 Cardiovascular Regular rate and rhythm,  S1 and S2 normal,  no murmur, click, rub, or gallop Dental 
 
Dentition: Caps/crowns and Raoul Shimon Pulmonary Breath sounds clear to auscultation Abdominal 
GI exam deferred Other Findings Anesthetic Plan ASA: 3 Anesthesia type: general 
 
Monitoring Plan: Arterial line Anesthetic plan and risks discussed with: Patient

## 2018-10-11 NOTE — IP AVS SNAPSHOT
[address] [facility] 
[phone] Patient: Justus Diop MRN: [id number] :1950 A check jeremy indicates which time of day the medication should be taken. My Medications START taking these medications Instructions Each Dose to Equal  
 Morning Noon Evening Bedtime  
 acetaminophen 325 mg tablet Commonly known as:  TYLENOL Your last dose was: Your next dose is: Take 2 Tabs by mouth every four (4) hours as needed. 650 mg CONTINUE taking these medications Instructions Each Dose to Equal  
 Morning Noon Evening Bedtime  
 amLODIPine 10 mg tablet Commonly known as:  Agueda Valerio Your last dose was: Your next dose is: Take  by mouth daily. aspirin delayed-release 81 mg tablet Your last dose was: Your next dose is: Take 81 mg by mouth two (2) times a day. 81 mg  
    
   
   
   
  
 FLONASE ALLERGY RELIEF 50 mcg/actuation nasal spray Generic drug:  fluticasone Your last dose was: Your next dose is: 2 Sprays by Both Nostrils route as needed for Rhinitis. 2 Spray JANUVIA 100 mg tablet Generic drug:  SITagliptin Your last dose was: Your next dose is: Take 100 mg by mouth every evening. 100 mg  
    
   
   
   
  
 lovastatin 40 mg tablet Commonly known as:  MEVACOR Your last dose was: Your next dose is: Take 40 mg by mouth nightly. 40 mg  
    
   
   
   
  
 metFORMIN 500 mg tablet Commonly known as:  GLUCOPHAGE Your last dose was: Your next dose is: Take 1,000 mg by mouth two (2) times daily (with meals). 1000 mg  
    
   
   
   
  
 metoprolol tartrate 25 mg tablet Commonly known as:  LOPRESSOR Your last dose was: Your next dose is: Take  by mouth two (2) times a day. SYNTHROID 137 mcg tablet Generic drug:  levothyroxine Your last dose was: Your next dose is: Take  by mouth Daily (before breakfast). ZyrTEC 10 mg Cap Generic drug:  Cetirizine Your last dose was: Your next dose is: Take  by mouth daily. STOP taking these medications   
 ibuprofen 600 mg tablet Commonly known as:  MOTRIN Where to Get Your Medications Information on where to get these meds will be given to you by the nurse or doctor. ! Ask your nurse or doctor about these medications  
  acetaminophen 325 mg tablet

## 2018-10-11 NOTE — PROGRESS NOTES
Pt is currently in PACU and order start time is for [id number].   OT will follow up tomorrow for eval.

## 2018-10-11 NOTE — PROGRESS NOTES
TRANSFER - IN REPORT:    Verbal report received from VeliaRN(name) on Shan Diaz  being received from PACU(unit) for routine progression of care      Report consisted of patients Situation, Background, Assessment and   Recommendations(SBAR). Information from the following report(s) SBAR, Kardex, Recent Results and Cardiac Rhythm NSR was reviewed with the receiving nurse. Opportunity for questions and clarification was provided. Assessment completed upon patients arrival to unit and care assumed.

## 2018-10-12 VITALS
WEIGHT: 150.38 LBS | TEMPERATURE: 98.2 F | SYSTOLIC BLOOD PRESSURE: 138 MMHG | OXYGEN SATURATION: 96 % | DIASTOLIC BLOOD PRESSURE: 66 MMHG | HEIGHT: 67 IN | HEART RATE: 73 BPM | BODY MASS INDEX: 23.6 KG/M2 | RESPIRATION RATE: 18 BRPM

## 2018-10-12 LAB
ANION GAP SERPL CALC-SCNC: 5 MMOL/L (ref 5–15)
BUN SERPL-MCNC: 9 MG/DL (ref 6–20)
BUN/CREAT SERPL: 15 (ref 12–20)
CALCIUM SERPL-MCNC: 8.2 MG/DL (ref 8.5–10.1)
CHLORIDE SERPL-SCNC: 111 MMOL/L (ref 97–108)
CO2 SERPL-SCNC: 26 MMOL/L (ref 21–32)
CREAT SERPL-MCNC: 0.61 MG/DL (ref 0.55–1.02)
ERYTHROCYTE [DISTWIDTH] IN BLOOD BY AUTOMATED COUNT: 12.8 % (ref 11.5–14.5)
GLUCOSE BLD STRIP.AUTO-MCNC: 142 MG/DL (ref 65–100)
GLUCOSE BLD STRIP.AUTO-MCNC: 155 MG/DL (ref 65–100)
GLUCOSE SERPL-MCNC: 136 MG/DL (ref 65–100)
HCT VFR BLD AUTO: 32.2 % (ref 35–47)
HGB BLD-MCNC: 10.9 G/DL (ref 11.5–16)
MCH RBC QN AUTO: 28.8 PG (ref 26–34)
MCHC RBC AUTO-ENTMCNC: 33.9 G/DL (ref 30–36.5)
MCV RBC AUTO: 85 FL (ref 80–99)
NRBC # BLD: 0 K/UL (ref 0–0.01)
NRBC BLD-RTO: 0 PER 100 WBC
PLATELET # BLD AUTO: 262 K/UL (ref 150–400)
PMV BLD AUTO: 10.3 FL (ref 8.9–12.9)
POTASSIUM SERPL-SCNC: 3.8 MMOL/L (ref 3.5–5.1)
RBC # BLD AUTO: 3.79 M/UL (ref 3.8–5.2)
SERVICE CMNT-IMP: ABNORMAL
SERVICE CMNT-IMP: ABNORMAL
SODIUM SERPL-SCNC: 142 MMOL/L (ref 136–145)
WBC # BLD AUTO: 15.5 K/UL (ref 3.6–11)

## 2018-10-12 PROCEDURE — 36415 COLL VENOUS BLD VENIPUNCTURE: CPT | Performed by: SURGERY

## 2018-10-12 PROCEDURE — 74011250636 HC RX REV CODE- 250/636: Performed by: SURGERY

## 2018-10-12 PROCEDURE — 77030032490 HC SLV COMPR SCD KNE COVD -B

## 2018-10-12 PROCEDURE — G8987 SELF CARE CURRENT STATUS: HCPCS | Performed by: OCCUPATIONAL THERAPIST

## 2018-10-12 PROCEDURE — G8989 SELF CARE D/C STATUS: HCPCS | Performed by: OCCUPATIONAL THERAPIST

## 2018-10-12 PROCEDURE — 82962 GLUCOSE BLOOD TEST: CPT

## 2018-10-12 PROCEDURE — 97165 OT EVAL LOW COMPLEX 30 MIN: CPT | Performed by: OCCUPATIONAL THERAPIST

## 2018-10-12 PROCEDURE — 74011250637 HC RX REV CODE- 250/637: Performed by: NURSE PRACTITIONER

## 2018-10-12 PROCEDURE — 80048 BASIC METABOLIC PNL TOTAL CA: CPT | Performed by: SURGERY

## 2018-10-12 PROCEDURE — G8988 SELF CARE GOAL STATUS: HCPCS | Performed by: OCCUPATIONAL THERAPIST

## 2018-10-12 PROCEDURE — 97161 PT EVAL LOW COMPLEX 20 MIN: CPT

## 2018-10-12 PROCEDURE — 85027 COMPLETE CBC AUTOMATED: CPT | Performed by: SURGERY

## 2018-10-12 PROCEDURE — 97116 GAIT TRAINING THERAPY: CPT

## 2018-10-12 PROCEDURE — 97535 SELF CARE MNGMENT TRAINING: CPT | Performed by: OCCUPATIONAL THERAPIST

## 2018-10-12 PROCEDURE — 74011250637 HC RX REV CODE- 250/637: Performed by: SURGERY

## 2018-10-12 RX ORDER — OXYCODONE HYDROCHLORIDE 5 MG/1
5 TABLET ORAL
Status: DISCONTINUED | OUTPATIENT
Start: 2018-10-12 | End: 2018-10-12 | Stop reason: HOSPADM

## 2018-10-12 RX ORDER — ACETAMINOPHEN 325 MG/1
650 TABLET ORAL
Status: DISCONTINUED | OUTPATIENT
Start: 2018-10-12 | End: 2018-10-12 | Stop reason: HOSPADM

## 2018-10-12 RX ORDER — ACETAMINOPHEN 325 MG/1
650 TABLET ORAL
Qty: 100 TAB | Refills: 0 | Status: SHIPPED
Start: 2018-10-12 | End: 2022-03-03

## 2018-10-12 RX ADMIN — ASPIRIN 81 MG: 81 TABLET, COATED ORAL at 09:09

## 2018-10-12 RX ADMIN — METOPROLOL TARTRATE 25 MG: 25 TABLET ORAL at 09:10

## 2018-10-12 RX ADMIN — CETIRIZINE HYDROCHLORIDE 10 MG: 10 TABLET, FILM COATED ORAL at 09:14

## 2018-10-12 RX ADMIN — ACETAMINOPHEN 650 MG: 325 TABLET ORAL at 09:14

## 2018-10-12 RX ADMIN — AMLODIPINE BESYLATE 10 MG: 5 TABLET ORAL at 09:08

## 2018-10-12 RX ADMIN — METFORMIN HYDROCHLORIDE 1000 MG: 500 TABLET, FILM COATED ORAL at 08:15

## 2018-10-12 RX ADMIN — SODIUM CHLORIDE 50 ML/HR: 900 INJECTION, SOLUTION INTRAVENOUS at 05:40

## 2018-10-12 RX ADMIN — LINAGLIPTIN 5 MG: 5 TABLET, FILM COATED ORAL at 09:10

## 2018-10-12 RX ADMIN — LEVOTHYROXINE SODIUM 137 MCG: 112 TABLET ORAL at 05:45

## 2018-10-12 NOTE — PROGRESS NOTES
Occupational Therapy EVALUATION/discharge  Patient: Clare Pollard (36 y.o. female)  Date: 10/12/2018  Primary Diagnosis: CATOTID STENOSIS  Carotid stenosis, asymptomatic  Procedure(s) (LRB):  RIGHT CAROTID ARTERY ENDARTERECTOMY (Right) 1 Day Post-Op   Precautions:   Fall    ASSESSMENT:   Based on the objective data described below, the patient presents with overall independent mobility and ADLS with good balance and safety awareness. Pt was educated on avoiding deep bending, valalva and heavy lifting. Also educated pt following instructions by surgeon on when and how to take care of bathing (wound care). Pt voiced understanding and was able to toilet, groom and perform lower body dressing without assist.  Stable vitals throughout session. Further skilled acute occupational therapy is not indicated at this time. Discharge Recommendations: home without services    Further Equipment Recommendations for Discharge: none      SUBJECTIVE:   Patient stated I feel ok.     OBJECTIVE DATA SUMMARY:   HISTORY:   Past Medical History:   Diagnosis Date    Arthritis     fingers    CAD (coronary artery disease)     Cancer ([facility] [address] 2013    melanoma - left forearm    Diabetes ([facility] [address]     Hypercholesterolemia     Hypertension     Thyroid disease     thyroidectomy     Past Surgical History:   Procedure Laterality Date    CABG, ARTERY-VEIN, THREE  2009    bypass    HX CHOLECYSTECTOMY  1990's    HX CORONARY ARTERY BYPASS GRAFT  06/26/09    x3    HX OTHER SURGICAL  2007    thyroidectomy     HX OTHER SURGICAL  07-    wide reexcision of left forearm intermediate melanoma    HX THYROIDECTOMY  2006    HX TONSILLECTOMY      as a child       Prior Level of Function/Environment/Context: ambulated without assist devices and performed all ADLS and IADLs without assist  Expanded or extensive additional review of patient history:     Home Situation  Home Environment: Private residence  # Steps to Enter: 1  One/Two Story Residence: One story  Living Alone: Yes  Support Systems: Family member(s)  Patient Expects to be Discharged to[de-identified] Private residence  Current DME Used/Available at Home: None  Tub or Shower Type: Tub/Shower combination    Hand dominance: Right    EXAMINATION OF PERFORMANCE DEFICITS:  Cognitive/Behavioral Status:  Neurologic State: Alert  Orientation Level: Oriented X4  Cognition: Appropriate decision making; Appropriate for age attention/concentration; Appropriate safety awareness  Perception: Appears intact  Perseveration: No perseveration noted  Safety/Judgement: Awareness of environment; Fall prevention;Home safety      Hearing: Auditory  Auditory Impairment: None    Vision/Perceptual:    Tracking: Able to track stimulus in all quadrants w/o difficulty                           Corrective Lenses: Glasses    Range of Motion:    AROM: Within functional limits  PROM: Within functional limits                      Strength:    Strength: Within functional limits                Coordination:     Fine Motor Skills-Upper: Left Intact; Right Intact    Gross Motor Skills-Upper: Left Intact; Right Intact    Tone & Sensation:                              Balance:  Sitting: Intact  Standing: Intact    Functional Mobility and Transfers for ADLs:  Bed Mobility:  Supine to Sit: Independent    Transfers:  Sit to Stand: Independent  Stand to Sit: Independent  Bed to Chair: Independent  Bathroom Mobility: Independent  Toilet Transfer : Independent    ADL Assessment:  Feeding: Independent    Oral Facial Hygiene/Grooming: Independent    Bathing: Independent    Upper Body Dressing: Independent    Lower Body Dressing: Independent    Toileting: Independent                ADL Intervention and task modifications:  See assessment for details  Cognitive Retraining  Safety/Judgement: Awareness of environment; Fall prevention;Home safety     Functional Measure:  Barthel Index:    Bathin  Bladder: 10  Bowels: 10  Groomin  Dressing: 10  Feeding: 10  Mobility: 10  Stairs: 10  Toilet Use: 10  Transfer (Bed to Chair and Back): 15  Total: 95       Barthel and G-code impairment scale:  Percentage of impairment CH  0% CI  1-19% CJ  20-39% CK  40-59% CL  60-79% CM  80-99% CN  100%   Barthel Score 0-100 100 99-80 79-60 59-40 20-39 1-19   0   Barthel Score 0-20 20 17-19 13-16 9-12 5-8 1-4 0      The Barthel ADL Index: Guidelines  1. The index should be used as a record of what a patient does, not as a record of what a patient could do. 2. The main aim is to establish degree of independence from any help, physical or verbal, however minor and for whatever reason. 3. The need for supervision renders the patient not independent. 4. A patient's performance should be established using the best available evidence. Asking the patient, friends/relatives and nurses are the usual sources, but direct observation and common sense are also important. However direct testing is not needed. 5. Usually the patient's performance over the preceding 24-48 hours is important, but occasionally longer periods will be relevant. 6. Middle categories imply that the patient supplies over 50 per cent of the effort. 7. Use of aids to be independent is allowed. Johnathon Barney., Barthel, D.W. (422). Functional evaluation: the Barthel Index. [address] [facility] (14)2. ZEYAD Anne, Nancy Dey., Katty Gary.[address] (1999). Measuring the change indisability after inpatient rehabilitation; comparison of the responsiveness of the Barthel Index and Functional [place] Measure. Journal of Neurology, Neurosurgery, and Psychiatry, 66(4), 861-172. Frida Alaniz, N.J.A, SARITHA Gleason.SRINIVASA, & Eliceo Jimenez, M.A. (2004.) Assessment of post-stroke quality of life in cost-effectiveness studies: The usefulness of the Barthel Index and the EuroQoL-5D. Quality of Life Research, 13, 516-81         G codes:   In compliance with CMSs Claims Based Outcome Reporting, the following G-code set was chosen for this patient based on their primary functional limitation being treated: The outcome measure chosen to determine the severity of the functional limitation was the barthel with a score of 95/100 which was correlated with the impairment scale. ? Self Care:     - CURRENT STATUS: CI - 1%-19% impaired, limited or restricted    - GOAL STATUS: CI - 1%-19% impaired, limited or restricted    - D/C STATUS:  CI - 1%-19% impaired, limited or restricted     Occupational Therapy Evaluation Charge Determination   History Examination Decision-Making   LOW Complexity : Brief history review  LOW Complexity : 1-3 performance deficits relating to physical, cognitive , or psychosocial skils that result in activity limitations and / or participation restrictions  LOW Complexity : No comorbidities that affect functional and no verbal or physical assistance needed to complete eval tasks       Based on the above components, the patient evaluation is determined to be of the following complexity level: LOW   Pain:  Pain Scale 1: Numeric (0 - 10)  Pain Intensity 1: 0           Pain Intervention(s) 1: Refused  Activity Tolerance:     Please refer to the flowsheet for vital signs taken during this treatment. After treatment:   [x]  Patient left in no apparent distress sitting up in chair  []  Patient left in no apparent distress in bed  [x]  Call bell left within reach  [x]  Nursing notified  []  Caregiver present  [x]  Bed alarm activated    COMMUNICATION/EDUCATION:   Communication/Collaboration:  [x]      Home safety education was provided and the patient/caregiver indicated understanding. [x]      Patient have participated as able and agree with findings and recommendations. []      Patient is unable to participate in plan of care at this time.   Findings and recommendations were discussed with: Physical Therapist, Registered Nurse and patient    Nedra Severe, OTR/L  Time Calculation: 15 mins

## 2018-10-12 NOTE — PROGRESS NOTES
PCU SHIFT NURSING NOTE      Bedside and Verbal shift change report given to Charlotte Payan RN (oncoming nurse) by Isai Moore RN (offgoing nurse). Report included the following information SBAR, Kardex, ED Summary, OR Summary, Procedure Summary, Intake/Output, MAR, Recent Results, Med Rec Status, Cardiac Rhythm NSR, Alarm Parameters  and Quality Measures. Shift Summary:   1305--Patient discharged to home with brother transporting. Will take patient to front door in wheelchair and assist into vehicle. Admission Date 10/11/2018   Admission Diagnosis CATOTID STENOSIS  Carotid stenosis, asymptomatic   Consults None        Consults   [x]PT   [x]OT   []Speech   [x]Case Management      [] Palliative      Cardiac Monitoring Order   [x]Yes   []No     IV drips   []Yes    Drip:                            Dose:  Drip:                            Dose:  Drip:                            Dose:   [x]No     GI Prophylaxis   [x]Yes   []No         DVT Prophylaxis   SCDs:  Sequential Compression Device: Bilateral          Moises stockings:         [x] Medication   []Contraindicated   []None      Activity Level Activity Level: Up with Assistance     Activity Assistance: Partial (one person)   Purposeful Rounding every 1-2 hour? [x]Yes   Nino Score  Total Score: 2   Bed Alarm (If score 3 or >)   [x]Yes   [] Refused (See signed refusal form in chart)   Saw Score  Saw Score: 20   Saw Score (if score 14 or less)   []PMT consult   [x]Wound Care consult      []Specialty bed   [x] Nutrition consult          Needs prior to discharge:   Home O2 required:    []Yes   [x]No    If yes, how much O2 required?     Other:    Last Bowel Movement: Last Bowel Movement Date: 10/10/18      Influenza Vaccine Received Flu Vaccine for Current Season (usually Sept-March): No    Patient/Guardian Refused (Notify MD): Yes   Pneumonia Vaccine           Diet Active Orders   Diet    DIET DIABETIC CONSISTENT CARB Regular      LDAs Peripheral IV 10/11/18 Left Hand (Active)   Site Assessment Clean, dry, & intact 10/12/2018  7:20 AM   Phlebitis Assessment 0 10/12/2018  7:20 AM   Infiltration Assessment 0 10/12/2018  7:20 AM   Dressing Status Clean, dry, & intact 10/12/2018  7:20 AM   Dressing Type Tape;Transparent 10/12/2018  7:20 AM   Hub Color/Line Status Green; Infusing;Flushed 10/12/2018  7:20 AM   Action Taken Open ports on tubing capped 10/12/2018  7:20 AM   Alcohol Cap Used Yes 10/12/2018  7:20 AM       Peripheral IV 10/11/18 Right Hand (Active)   Site Assessment Clean, dry, & intact 10/12/2018  7:20 AM   Phlebitis Assessment 0 10/12/2018  7:20 AM   Infiltration Assessment 0 10/12/2018  7:20 AM   Dressing Status Clean, dry, & intact 10/12/2018  7:20 AM   Dressing Type Tape;Transparent 10/12/2018  7:20 AM   Hub Color/Line Status Green;Capped;Flushed 10/12/2018  7:20 AM   Action Taken Open ports on tubing capped 10/12/2018  7:20 AM   Alcohol Cap Used Yes 10/12/2018  7:20 AM          Donald-Doe Drain 10/11/18 Right Neck (Active)   Site Assessment Clean, dry, & intact 10/12/2018  8:00 AM   Dressing Status Clean, dry, & intact 10/12/2018  8:00 AM   Status Patent;Draining 10/12/2018  8:00 AM   Drainage Color Sanguinous 10/12/2018  8:00 AM   Output (ml) 10 ml 10/12/2018  8:00 AM                Urinary Catheter      Intake & Output   Date 10/11/18 0700 - 10/12/18 0659 10/12/18 0700 - 10/13/18 0659   Shift 7262-47661859 1900-0659 24 Hour Total 0700-1859 1900-0659 24 Hour Total   I  N  T  A  K  E   P. O. 600 100 700         P. O. 600 100 700       I.V.  (mL/kg/hr) 1390.8  (1.7) 681.7  (0.8) 2072.5  (1.3) 50.8  50.8      Cardene Volume  81.7 81.7 0  0      Phenylephrine Volume  0 0 0  0      Volume (lactated Ringers infusion) 1000  1000         Volume (0.9% sodium chloride infusion) 390.8 600 990.8 50.8  50.8    Shift Total  (mL/kg) 1990.8  (29.2) 781.7  (11.5) 2772.5  (40.7) 50.8  (0.7)  50.8  (0.7)   O  U  T  P  U  T   Urine  (mL/kg/hr) 600  (0.7) 1825  (2.2) 2425  (1.5)         Urine Voided 600 1825 2425         Urine Occurrence(s)    1 x  1 x    Drains  60 60 10  10      Output (ml) (Donald-Doe Drain 10/11/18 Right Neck)  60 60 10  10    Blood 150  150         Estimated Blood Loss 150  150       Shift Total  (mL/kg) 750  (11) 1885  (27.7) 2635  (38.7) 10  (0.1)  10  (0.1)   NET 1240.8 -1103.3 137.5 40.8  40.8   Weight (kg) 68.1 68.1 68.1 68.2 68.2 68.2         Readmission Risk Assessment Tool Score Low Risk            8       Total Score        3 Has Seen PCP in Last 6 Months (Yes=3, No=0)    5 Pt. Coverage (Medicare=5 , Medicaid, or Self-Pay=4)        Criteria that do not apply:    . Living with Significant Other. Assisted Living. LTAC. SNF.  or   Rehab    Patient Length of Stay (>5 days = 3)    IP Visits Last 12 Months (1-3=4, 4=9, >4=11)    Charlson Comorbidity Score (Age + Comorbid Conditions)       Expected Length of Stay - - -   Actual Length of Stay 1

## 2018-10-12 NOTE — DISCHARGE INSTRUCTIONS
You may shower on Sunday. Dry the wound carefully. The dressing can be removed if there is no drainage, or changed and kept in place for comfort. Do not attempt to drive a car until you are comfortable and NOT taking pain medication. No heavy lifting (3 lbs limit). Mild exercise and light duties around the house are OK. Call the office at [phone] if you have any problems.

## 2018-10-12 NOTE — PROGRESS NOTES
PCU SHIFT NURSING NOTE      Bedside and Verbal shift change report given to Lori Pizarro RN (oncoming nurse) by Amy Love RN (offgoing nurse). Report included the following information SBAR, Kardex, ED Summary, OR Summary, Intake/Output, MAR, Recent Results and Cardiac Rhythm NSR. Shift Summary: 5999    7200: Assessment completed. Pt A&O x 4. VSS. Right neck dressing c,d,i. VICTORIANO site c,d,i - bulb patent and draining sanguinous fluid. Pt c/o right neck pain 7/10, but refusing pain medication. Bed alarm in place. Call bell within reach. Will monitor. Admission Date 10/11/2018   Admission Diagnosis CATOTID STENOSIS  Carotid stenosis, asymptomatic   Consults None        Consults   [x]PT   [x]OT   []Speech   []Case Management      [] Palliative      Cardiac Monitoring Order   [x]Yes   []No     IV drips   []Yes    Drip:                            Dose:  Drip:                            Dose:  Drip:                            Dose:   [x]No     GI Prophylaxis   []Yes   [x]No         DVT Prophylaxis   SCDs:  Sequential Compression Device: Bilateral          Moises stockings:         [] Medication   []Contraindicated   []None      Activity Level Activity Level: Up with Assistance     Activity Assistance: Partial (one person)   Purposeful Rounding every 1-2 hour? [x]Yes   Nino Score  Total Score: 2   Bed Alarm (If score 3 or >)   [x]Yes   [] Refused (See signed refusal form in chart)   Saw Score  Saw Score: 22   Saw Score (if score 14 or less)   []PMT consult   []Wound Care consult      []Specialty bed   [] Nutrition consult          Needs prior to discharge:   Home O2 required:    []Yes   [x]No    If yes, how much O2 required?     Other:    Last Bowel Movement: Last Bowel Movement Date: 10/10/18      Influenza Vaccine          Pneumonia Vaccine           Diet Active Orders   Diet    DIET DIABETIC CONSISTENT CARB Regular      LDAs               Peripheral IV 10/11/18 Left Hand (Active)   Site Assessment Clean, dry, & intact 10/11/2018 11:08 PM   Phlebitis Assessment 0 10/11/2018 11:08 PM   Infiltration Assessment 0 10/11/2018 11:08 PM   Dressing Status Clean, dry, & intact 10/11/2018 11:08 PM   Dressing Type Transparent;Tape 10/11/2018 11:08 PM   Hub Color/Line Status Green; Infusing 10/11/2018 11:08 PM       Peripheral IV 10/11/18 Right Hand (Active)   Site Assessment Clean, dry, & intact 10/11/2018 11:08 PM   Phlebitis Assessment 0 10/11/2018 11:08 PM   Infiltration Assessment 0 10/11/2018 11:08 PM   Dressing Status Clean, dry, & intact 10/11/2018 11:08 PM   Dressing Type Transparent;Tape 10/11/2018 11:08 PM   Hub Color/Line Status Green;Capped 10/11/2018 11:08 PM          Donald-Doe Drain 10/11/18 Right Neck (Active)   Site Assessment Clean, dry, & intact 10/11/2018 11:05 PM   Dressing Status Clean, dry, & intact 10/11/2018 11:05 PM   Status Patent; Charged;Draining 10/11/2018 11:05 PM   Drainage Color Sanguinous 10/11/2018 11:05 PM   Output (ml) 40 ml 10/11/2018 11:05 PM                Urinary Catheter      Intake & Output   Date 10/10/18 1900 - 10/11/18 0659(Not Admitted) 10/11/18 0700 - 10/12/18 0659   Shift 4579-2370 24 Hour Total 9733-1990 7087-6800 24 Hour Total   I  N  T  A  K  E   P. O.   600  600      P. O.   600  600    I.V.  (mL/kg/hr)   1390.8  (1.7) 248. 3 1639.2      Volume (lactated Ringers infusion)   1000  1000      Volume (0.9% sodium chloride infusion)   390.8 248.3 639.2    Shift Total  (mL/kg)   1990.8  (29.2) 248.3  (3.6) 2239.2  (32.9)   O  U  T  P  U  T   Urine  (mL/kg/hr)   600  (0.7) 675 1275      Urine Voided         Urine Occurrence(s) 1 x 1 x       Drains    40 40      Output (ml) (Donald-Doe Drain 10/11/18 Right Neck)    40 40    Blood   150  150      Estimated Blood Loss   150  150    Shift Total  (mL/kg)   750  (11) 715  (10.5) 1465  (21.5)   NET   1240.8 -466.7 774.2   Weight (kg) 68.1 68.1 68.1 68.1 68.1         Readmission Risk Assessment Tool Score Low Risk            8 Total Score        3 Has Seen PCP in Last 6 Months (Yes=3, No=0)    5 Pt. Coverage (Medicare=5 , Medicaid, or Self-Pay=4)        Criteria that do not apply:    . Living with Significant Other. Assisted Living. LTAC. SNF.  or   Rehab    Patient Length of Stay (>5 days = 3)    IP Visits Last 12 Months (1-3=4, 4=9, >4=11)    Charlson Comorbidity Score (Age + Comorbid Conditions)       Expected Length of Stay - - -   Actual Length of Stay 0

## 2018-10-12 NOTE — PROGRESS NOTES
physical Therapy EVALUATION/DISCHARGE  Patient: Gualberto Armendariz (89 y.o. female)  Date: 10/12/2018  Primary Diagnosis: CATOTID STENOSIS  Carotid stenosis, asymptomatic  Procedure(s) (LRB):  RIGHT CAROTID ARTERY ENDARTERECTOMY (Right) 1 Day Post-Op   Precautions:   Fall  ASSESSMENT :  Based on the objective data described below, the patient was admitted 10/11/18 s/p R carotid artery endarterectomy. Pt received supine in bed and agreeable to therapy. Pt reported prior to admission she was ambulatory, independent, lives alone in a 1 story home. Pt's brother plans to stay at home with her for a few days. Pt tolerated evaluation well, BP stable. Pt completed bed mobility and functional transfers independently, ambulated in the hallway without difficulty or apparent gait abnormalities. Pt remained seated in chair with bed alarm set. Recommend OOB to chair and ambulate with RN staff to prevent deconditioning. Will complete orders. Recommend home with family supervision and assist PRN. .    Skilled physical therapy is not indicated at this time. PLAN :  Discharge Recommendations: None  Further Equipment Recommendations for Discharge: none     SUBJECTIVE:   Patient stated I am feeling pretty good. Sore along my neck.     OBJECTIVE DATA SUMMARY:   HISTORY:    Past Medical History:   Diagnosis Date    Arthritis     fingers    CAD (coronary artery disease)     Cancer ([facility] [address] 2013    melanoma - left forearm    Diabetes ([facility] [address] [facility]     Hypercholesterolemia     Hypertension     Thyroid disease     thyroidectomy     Past Surgical History:   Procedure Laterality Date    CABG, ARTERY-VEIN, THREE  2009    bypass    HX CHOLECYSTECTOMY  1990's    HX CORONARY ARTERY BYPASS GRAFT  06/26/09    x3    HX OTHER SURGICAL  2007    thyroidectomy     HX OTHER SURGICAL  07-    wide reexcision of left forearm intermediate melanoma    HX THYROIDECTOMY  2006    HX TONSILLECTOMY      as a child     Prior Level of Function/Home Situation: see above  Personal factors and/or comorbidities impacting plan of care:     Home Situation  Home Environment: Private residence  # Steps to Enter: 1  One/Two Story Residence: One story  Living Alone: Yes  Support Systems: Family member(s)  Patient Expects to be Discharged to[de-identified] Private residence  Current DME Used/Available at Home: None  Tub or Shower Type: Tub/Shower combination    EXAMINATION/PRESENTATION/DECISION MAKING:   Critical Behavior:  Neurologic State: (P) Alert, Appropriate for age  Orientation Level: (P) Oriented X4  Cognition: (P) Appropriate decision making, Follows commands     Hearing: Auditory  Auditory Impairment: None  Skin:  Incision to R lateral neck with VICTORIANO drain  Edema:   Range Of Motion:  AROM: Within functional limits           PROM: Within functional limits           Strength:    Strength:  Within functional limits                    Tone & Sensation:                                  Coordination:     Vision:      Functional Mobility:  Bed Mobility:     Supine to Sit: Independent        Transfers:  Sit to Stand: Independent  Stand to Sit: Independent                       Balance:   Sitting: Intact  Standing: Intact  Ambulation/Gait Training:  Distance (ft): 150 Feet (ft)  Assistive Device: Gait belt  Ambulation - Level of Assistance: Stand-by assistance        Gait Abnormalities: Decreased step clearance        Base of Support: Widened     Speed/Missy: Pace decreased (<100 feet/min)          Functional Measure:  Barthel Index:    Bathin  Bladder: 10  Bowels: 10  Groomin  Dressing: 10  Feeding: 10  Mobility: 10  Stairs: 10  Toilet Use: 10  Transfer (Bed to Chair and Back): 15  Total: 95       Barthel and G-code impairment scale:  Percentage of impairment CH  0% CI  1-19% CJ  20-39% CK  40-59% CL  60-79% CM  80-99% CN  100%   Barthel Score 0-100 100 99-80 79-60 59-40 20-39 1-19   0   Barthel Score 0-20 20 17-19 13-16 9-12 5-8 1-4 0      The Barthel ADL Index: Guidelines  1. The index should be used as a record of what a patient does, not as a record of what a patient could do. 2. The main aim is to establish degree of independence from any help, physical or verbal, however minor and for whatever reason. 3. The need for supervision renders the patient not independent. 4. A patient's performance should be established using the best available evidence. Asking the patient, friends/relatives and nurses are the usual sources, but direct observation and common sense are also important. However direct testing is not needed. 5. Usually the patient's performance over the preceding 24-48 hours is important, but occasionally longer periods will be relevant. 6. Middle categories imply that the patient supplies over 50 per cent of the effort. 7. Use of aids to be independent is allowed. Carlos Alberto Appiah., Barthel, D.W. (8199). Functional evaluation: the Barthel Index. [facility] (14)2. ZEYAD Barron, Candy Ng., Cherry Umaña, [place], [address] [place] [address] (1999). Measuring the change indisability after inpatient rehabilitation; comparison of the responsiveness of the Barthel Index and Functional [place] Measure. Journal of Neurology, Neurosurgery, and Psychiatry, 66(4), 758-544. Elva Pavon, N.J.A, GRACE Gleason, & Gail Deluca, M.A. (2004.) Assessment of post-stroke quality of life in cost-effectiveness studies: The usefulness of the Barthel Index and the EuroQoL-5D. Quality of Life Research, 13, 769-24         G codes: In compliance with CMSs Claims Based Outcome Reporting, the following G-code set was chosen for this patient based on their primary functional limitation being treated: The outcome measure chosen to determine the severity of the functional limitation was the Barthel with a score of 95/100 which was correlated with the impairment scale.     ? Mobility - Walking and Moving Around:     - CURRENT STATUS: CI - 1%-19% impaired, limited or restricted    - GOAL STATUS: CI - 1%-19% impaired, limited or restricted    - D/C STATUS:  CI - 1%-19% impaired, limited or restricted         Based on the above components, the patient evaluation is determined to be of the following complexity level: LOW     Pain:  Pain Scale 1: Numeric (0 - 10)  Pain Intensity 1: 0     Activity Tolerance:   Good. VSS  Please refer to the flowsheet for vital signs taken during this treatment. After treatment:   [x]   Patient left in no apparent distress sitting up in chair  []   Patient left in no apparent distress in bed  [x]   Call bell left within reach  [x]   Nursing notified  []   Caregiver present  [x]   Bed alarm activated    COMMUNICATION/EDUCATION:   Communication/Collaboration:  [x]   Fall prevention education was provided and the patient/caregiver indicated understanding. [x]   Patient/family have participated as able and agree with findings and recommendations. []   Patient is unable to participate in plan of care at this time.   Findings and recommendations were discussed with: Occupational Therapist and Registered Nurse    Thank you for this referral.  Smith Cerda, PT , DPT   Time Calculation: 17 mins

## 2018-10-12 NOTE — PROGRESS NOTES
Problem: Falls - Risk of  Goal: *Absence of Falls  Document Shira Fall Risk and appropriate interventions in the flowsheet.    Outcome: Progressing Towards Goal  Fall Risk Interventions:            Medication Interventions: Bed/chair exit alarm, Patient to call before getting OOB, Teach patient to arise slowly, Evaluate medications/consider consulting pharmacy    Elimination Interventions: Bed/chair exit alarm, Call light in reach, Elevated toilet seat, Toileting schedule/hourly rounds, Toilet paper/wipes in reach, Patient to call for help with toileting needs    History of Falls Interventions: Bed/chair exit alarm, Consult care management for discharge planning, Evaluate medications/consider consulting pharmacy, Room close to nurse's station, Utilize gait belt for transfer/ambulation, Door open when patient unattended

## 2018-10-12 NOTE — DISCHARGE SUMMARY
Vascular Surgery Discharge Summary     Patient ID:  Mohini Marrero  [id number]  29 y.o.  1950    Admitting Provider: Thad Arriola MD    Admit Date: 10/11/2018    Discharge Date: 10/12/2018    Discharge Diagnoses:    Carotid stenosis, asymptomatic POA yes   ASHD POA yes  -hx of CABG    Hypertensive disorder POA yes   Hyperlipidemia POA yes   Diabetes POA yes   Thyroid disease POA yes   Anemia of acute blood loss POA no     Procedures:   RIGHT CAROTID ARTERY ENDARTERECTOMY 10/11/2018    Hospital Course:   Ms. Belén Guadarrama is an elderly 74 yo female with a pmhx significant for ASHD, HTN, HLD, DM, and thyroid disease. She was admitted to the hospital following a right CEA. Her comorbid conditions were stable. She did have some mild post procedural leukocytosis. She also had mild anemia that was not at levels to transfuse. She did not have an evidence of neck hematoma and she denied difficulty swallowing. She will follow up in the clinic in 2 weeks.       Pertinent Results:   Recent Results (from the past 24 hour(s))   GLUCOSE, POC    Collection Time: 10/11/18 10:41 AM   Result Value Ref Range    Glucose (POC) 199 (H) 65 - 100 mg/dL    Performed by Cheryl Lopez    GLUCOSE, POC    Collection Time: 10/11/18  4:25 PM   Result Value Ref Range    Glucose (POC) 237 (H) 65 - 100 mg/dL    Performed by Adam Mobley    GLUCOSE, POC    Collection Time: 10/11/18  9:21 PM   Result Value Ref Range    Glucose (POC) 182 (H) 65 - 100 mg/dL    Performed by Orlando Villalobos (PCT)    CBC W/O DIFF    Collection Time: 10/12/18  2:51 AM   Result Value Ref Range    WBC 15.5 (H) 3.6 - 11.0 K/uL    RBC 3.79 (L) 3.80 - 5.20 M/uL    HGB 10.9 (L) 11.5 - 16.0 g/dL    HCT 32.2 (L) 35.0 - 47.0 %    MCV 85.0 80.0 - 99.0 FL    MCH 28.8 26.0 - 34.0 PG    MCHC 33.9 30.0 - 36.5 g/dL    RDW 12.8 11.5 - 14.5 %    PLATELET 534 543 - 007 K/uL    MPV 10.3 8.9 - 12.9 FL    NRBC 0.0 0  WBC    ABSOLUTE NRBC 0.00 0.00 - 3.90 K/uL   METABOLIC PANEL, BASIC    Collection Time: 10/12/18  2:51 AM   Result Value Ref Range    Sodium 142 136 - 145 mmol/L    Potassium 3.8 3.5 - 5.1 mmol/L    Chloride 111 (H) 97 - 108 mmol/L    CO2 26 21 - 32 mmol/L    Anion gap 5 5 - 15 mmol/L    Glucose 136 (H) 65 - 100 mg/dL    BUN 9 6 - 20 MG/DL    Creatinine 0.61 0.55 - 1.02 MG/DL    BUN/Creatinine ratio 15 12 - 20      GFR est AA >60 >60 ml/min/1.73m2    GFR est non-AA >60 >60 ml/min/1.73m2    Calcium 8.2 (L) 8.5 - 10.1 MG/DL         Results from Hospital Encounter encounter on 10/05/18   XR CHEST PA LAT   Narrative PA AND LATERAL CHEST RADIOGRAPHS: 10/5/2018 12:46 PM    INDICATION: Preop. History of hypercholesterolemia, diabetes, hypertension,  coronary artery disease, melanoma. COMPARISON: 7/2/2013, 9/29/2006. TECHNIQUE: Frontal and lateral radiographs of the chest.    FINDINGS:  The lungs are clear. The central airways are patent. The heart size is normal.  Post CABG. No pneumothorax or pleural effusion. Multiple surgical clips in the  left axilla. Cholecystectomy clips in the right upper quadrant. Impression IMPRESSION:   Clear lungs.         Vital signs: Patient Vitals for the past 24 hrs:   BP Temp Pulse Resp SpO2   10/12/18 0252 137/61 98.3 °F (36.8 °C) 84 18 94 %   10/11/18 2302 155/68 98.2 °F (36.8 °C) 81 18 94 %   10/11/18 2159 149/71 - 80 - -   10/11/18 1835 146/73 98.1 °F (36.7 °C) 85 16 96 %   10/11/18 1833 - - - - 96 %   10/11/18 1522 153/72 - (!) 102 - 93 %   10/11/18 1503 154/74 98 °F (36.7 °C) 93 15 96 %   10/11/18 1440 - 98 °F (36.7 °C) 93 15 96 %   10/11/18 1430 140/67 - 94 16 96 %   10/11/18 1400 135/61 - 91 16 97 %   10/11/18 1330 131/59 - 96 20 96 %   10/11/18 1300 139/61 - 93 15 96 %   10/11/18 1245 138/57 - 92 17 95 %   10/11/18 1230 136/55 - 89 16 96 %   10/11/18 1215 134/62 - 89 16 95 %   10/11/18 1200 142/54 - 87 15 96 %   10/11/18 1145 134/59 - 88 17 94 %   10/11/18 1130 132/52 - 84 13 94 %   10/11/18 1115 130/50 - 92 16 93 % 10/11/18 1100 134/52 - 92 18 94 %   10/11/18 1055 117/70 - 89 18 95 %   10/11/18 1050 135/50 - 85 17 95 %   10/11/18 1045 127/48 - 87 18 95 %   10/11/18 1043 125/54 97.7 °F (36.5 °C) 82 17 95 %   10/11/18 1040 125/54 - 87 18 96 %   10/11/18 1038 125/54 97.7 °F (36.5 °C) 88 18 95 %       Patient Weight:   Last 3 Recorded Weights in this Encounter    10/11/18 0645   Weight: 68.1 kg (150 lb 2.1 oz)       Consults: None    Patient Condition at Discharge: stable    Disposition: home    Patient Instructions:   Current Discharge Medication List      START taking these medications    Details   acetaminophen (TYLENOL) 325 mg tablet Take 2 Tabs by mouth every four (4) hours as needed. Qty: 100 Tab, Refills: 0         CONTINUE these medications which have NOT CHANGED    Details   SITagliptin (JANUVIA) 100 mg tablet Take 100 mg by mouth every evening. aspirin delayed-release 81 mg tablet Take 81 mg by mouth two (2) times a day. fluticasone (FLONASE ALLERGY RELIEF) 50 mcg/actuation nasal spray 2 Sprays by Both Nostrils route as needed for Rhinitis. Cetirizine (ZYRTEC) 10 mg cap Take  by mouth daily. amLODIPine (NORVASC) 10 mg tablet Take  by mouth daily. metoprolol (LOPRESSOR) 25 mg tablet Take  by mouth two (2) times a day. lovastatin (MEVACOR) 40 mg tablet Take 40 mg by mouth nightly. metFORMIN (GLUCOPHAGE) 500 mg tablet Take 1,000 mg by mouth two (2) times daily (with meals). levothyroxine (SYNTHROID) 137 mcg tablet Take  by mouth Daily (before breakfast). STOP taking these medications       ibuprofen (MOTRIN) 600 mg tablet Comments:   Reason for Stopping:               Diet: Cardiac Diet and Diabetic Diet  Activity/Wound Care: You may shower on Sunday. Dry the wound carefully. The dressing can be removed if there is no drainage, or changed and kept in place for comfort. Do not attempt to drive a car until you are comfortable and NOT taking pain medication.     No heavy lifting (3 lbs limit). Mild exercise and light duties around the house are OK. Call the office at [phone] if you have any problems.      Follow-up Information     Follow up With Details Comments [address] Mickey Braun MD   [address]  [address] [facility]      Yessenia Martinez MD In 2 weeks  [address] [facility]  [place] [facility] [address]  [phone]            Signed:  Geneva Patterson NP  10/12/2018  8:25 AM

## 2020-01-22 NOTE — PERIOP NOTES
[address] [facility] Adult  Hospitalist Group Hospitalist Progress Note Romi Brar MD 
Answering service: [phone] or [phone] from in house phone Date of Service:  2020 NAME:  Francis Diego :  1946 MRN:  [id number] Admission Summary:  
Francis Diego is a 75 y.o. male with past medical history significant for diabetes mellitus type 2, hypertension, history of dementia presents the emergency room from a rehab facility due to concern for dehydration and failure to thrive. As per medical report patient has refused to eat or drink anything in the last several days becoming weaker. Medical provider at the facility decided to order blood work-up which were abnormal.  Sent to ER for IV fluid. Patient at this time awake following basic commands but disoriented. Does not seem in acute distress but appears very dry. Denies any complaint. No family at the bedside. Work-up in the emergency room patient was found to have hyponatremia, hyperkalemia and acute kidney injury. He was started on normal saline and given regular insulin plus glucose for the hyperkalemia. Admission requested for dehydration and failure to thrive. Unable to obtain more history due to patient's mental status.  
  
Interval history / Subjective:  
   
Patient seen and examined today. Chart and labs reviewed. He is very confused right now. His sodium is high at 163 I have changed to dextrose half-normal saline to dextrose at high rate and increase his Lantus. Nephrology consult to help us with worsening sodium. Waiting for the next BMP. Assessment & Plan: 1. Hypernatremia- 
 severe hypernatremia likely from failure to thrive. Change dextrose half-normal saline to dextrose at 150 mL/h 
- Monitor Na michelle 6 hours. Nephrology on board now 
  
2. JOSH on CKD-  
likely prerenal in etiology. IV fluids as above Family updated on plan of care--voiced verbal understanding from brother, Nilo Luis. Will continue to monitor. - Monitor renal function and urine output. 3. Non anion gap metabolic acidosis- 
Likely from dehydration and JOSH now improving. 
  
4. Hyperkalemia- 
 likely secondary to renal failure. Received insulin/glucose in ER.  
- Give calcium gluconate, amp of bicarb, albuterol.  
- IV fluids as above Resolved 
  
5. Metabolic encephalopathy Patient had recent work in January 2020 including MRI of the brain and EEG that were unremarkable. He is able to move all his extremities but is very confused and not following commands 
  
6. Diabetes Mellitus type 2- uncontrolled. Increase his Lantus - Placed on sliding scale insulin ac and hs q6 hours.  
  
7. Failure to thrive- Body mass index is 19.72 kg/m². severe protein calorie malnutrition 
could be related to worsening dementia. SLP to see him today 
  
8. Leukocytosis 
 - likely hemoconcentration from dehydration. No source of infection at this time. Resolved 
  
9. HTN- 
 bp stable. Hold home meds due to ams Palliative care consult because of failure to thrive. BMP tomorrow morning Continue IV fluids for now No family bedside Code status: full DVT prophylaxis:  
 
Care Plan discussed with: Patient/Family Disposition: TBD Hospital Problems  Date Reviewed: 12/4/2018 Codes Class Noted POA Hyperkalemia ICD-10-CM: E87.5 ICD-9-CM: 276.7  1/21/2020 Unknown Hypernatremia ICD-10-CM: E87.0 ICD-9-CM: 276.0  1/21/2020 Unknown Altered mental status ICD-10-CM: R41.82 
ICD-9-CM: 780.97  1/21/2020 Unknown Failure to thrive in adult ICD-10-CM: R62.7 ICD-9-CM: 783.7  1/21/2020 Unknown JOSH (acute kidney injury) ([facility] [address] ICD-10-CM: N17.9 ICD-9-CM: 584.9  1/21/2020 Unknown Review of Systems: A comprehensive review of systems was negative except for that written in the HPI. Vital Signs:  
 Last 24hrs VS reviewed since prior progress note. Most recent are: 
Visit Vitals /82 (BP 1 Location: Left arm, BP Patient Position: At rest) Pulse 98 Temp 98 °F (36.7 °C) Resp 14 Ht 5' 5\" (1.651 m) Wt 53.7 kg (118 lb 7.6 oz) SpO2 98% BMI 19.72 kg/m² Intake/Output Summary (Last 24 hours) at 1/22/2020 1722 Last data filed at 1/22/2020 0330 Gross per 24 hour Intake 2000 ml Output  Net 2000 ml Physical Examination:  
 
 
     
Constitutional:  Confused and not following commands ENT:  Oral mucous moist, oropharynx benign. Resp:  CTA bilaterally. No wheezing/rhonchi/rales. No accessory muscle use CV:  Regular rhythm, normal rate, no murmurs, gallops, rubs GI:  Soft, non distended, non tender. normoactive bowel sounds, no hepatosplenomegaly Musculoskeletal:  No edema, warm, 2+ pulses throughout Neurologic:  Moves all extremities. But not following commands Data Review:  
 Review and/or order of clinical lab test 
 
 
Labs:  
 
Recent Labs  
  01/21/20 
0153 WBC 13.6* HGB 14.4 HCT 43.8  Recent Labs  
  01/22/20 
7809 01/22/20 
0456 01/21/20 
1735 * 163* 155* K 4.1 4.0 4.3 * 136* 132* CO2 24 22 18* BUN 61* 67* 77* CREA 2.12* 2.11* 2.25* * 170* 450* CA 8.7 9.0 8.5 Recent Labs  
  01/21/20 
0153 SGOT 21 ALT 28 AP 93 TBILI 0.9 TP 8.5* ALB 3.4*  
GLOB 5.1* No results for input(s): INR, PTP, APTT, INREXT in the last 72 hours. No results for input(s): FE, TIBC, PSAT, FERR in the last 72 hours. No results found for: FOL, RBCF No results for input(s): PH, PCO2, PO2 in the last 72 hours. Recent Labs  
  01/21/20 0153 TROIQ <0.05 Lab Results Component Value Date/Time Cholesterol, total 190 01/02/2020 04:06 AM  
 HDL Cholesterol 64 01/02/2020 04:06 AM  
 LDL, calculated 115.4 (H) 01/02/2020 04:06 AM  
 Triglyceride 53 01/02/2020 04:06 AM  
 CHOL/HDL Ratio 3.0 01/02/2020 04:06 AM  
 
Lab Results Component Value Date/Time Glucose (POC) 248 (H) 01/22/2020 12:27 PM  
 Glucose (POC) 185 (H) 01/22/2020 05:47 AM  
 Glucose (POC) 288 (H) 01/21/2020 11:50 PM  
 Glucose (POC) 201 (H) 01/21/2020 05:21 PM  
 Glucose (POC) 295 (H) 01/21/2020 11:28 AM  
 
Lab Results Component Value Date/Time Color YELLOW/STRAW 01/21/2020 02:46 AM  
 Appearance CLEAR 01/21/2020 02:46 AM  
 Specific gravity 1.018 01/21/2020 02:46 AM  
 pH (UA) 5.0 01/21/2020 02:46 AM  
 Protein NEGATIVE  01/21/2020 02:46 AM  
 Glucose NEGATIVE  01/21/2020 02:46 AM  
 Ketone NEGATIVE  01/21/2020 02:46 AM  
 Bilirubin NEGATIVE  01/21/2020 02:46 AM  
 Urobilinogen 0.2 01/21/2020 02:46 AM  
 Nitrites NEGATIVE  01/21/2020 02:46 AM  
 Leukocyte Esterase NEGATIVE  01/21/2020 02:46 AM  
 Epithelial cells FEW 01/21/2020 02:46 AM  
 Bacteria NEGATIVE  01/21/2020 02:46 AM  
 WBC 0-4 01/21/2020 02:46 AM  
 RBC 0-5 01/21/2020 02:46 AM  
 
 
 
Medications Reviewed:  
 
Current Facility-Administered Medications Medication Dose Route Frequency  dextrose 5% infusion  150 mL/hr IntraVENous CONTINUOUS  
 insulin glargine (LANTUS) injection 20 Units  20 Units SubCUTAneous DAILY  balsam peru-castor oil (VENELEX) ointment   Topical Q8H  
 sodium chloride (NS) flush 5-40 mL  5-40 mL IntraVENous Q8H  
 sodium chloride (NS) flush 5-40 mL  5-40 mL IntraVENous PRN  
 glucose chewable tablet 16 g  4 Tab Oral PRN  
 glucagon (GLUCAGEN) injection 1 mg  1 mg IntraMUSCular PRN  
 dextrose 10% infusion 0-250 mL  0-250 mL IntraVENous PRN  
 insulin lispro (HUMALOG) injection   SubCUTAneous Q6H  
 influenza vaccine 2019-20 (6 mos+)(PF) (FLUARIX/FLULAVAL/FLUZONE QUAD) injection 0.5 mL  0.5 mL IntraMUSCular PRIOR TO DISCHARGE  
 
______________________________________________________________________ EXPECTED LENGTH OF STAY: 3d 4h 
ACTUAL LENGTH OF STAY:          1 Magalie Garcia MD

## 2021-10-11 ENCOUNTER — TRANSCRIBE ORDER (OUTPATIENT)
Dept: SCHEDULING | Age: 71
End: 2021-10-11

## 2021-10-11 DIAGNOSIS — R79.89 HYPOURICEMIA: Primary | ICD-10-CM

## 2021-10-14 ENCOUNTER — HOSPITAL ENCOUNTER (OUTPATIENT)
Dept: ULTRASOUND IMAGING | Age: 71
Discharge: HOME | End: 2021-10-14
Attending: FAMILY MEDICINE
Payer: MEDICARE

## 2021-10-14 DIAGNOSIS — R79.89 HYPOURICEMIA: ICD-10-CM

## 2021-10-14 PROCEDURE — 76705 ECHO EXAM OF ABDOMEN: CPT

## 2021-10-18 ENCOUNTER — TRANSCRIBE ORDER (OUTPATIENT)
Dept: SCHEDULING | Age: 71
End: 2021-10-18

## 2021-10-18 DIAGNOSIS — K86.89 SUBCUTANEOUS NODULAR FAT NECROSIS IN PANCREATITIS: Primary | ICD-10-CM

## 2021-11-05 ENCOUNTER — HOSPITAL ENCOUNTER (OUTPATIENT)
Dept: MRI IMAGING | Age: 71
Discharge: HOME | End: 2021-11-05
Attending: FAMILY MEDICINE
Payer: MEDICARE

## 2021-11-05 DIAGNOSIS — K86.89 SUBCUTANEOUS NODULAR FAT NECROSIS IN PANCREATITIS: ICD-10-CM

## 2021-11-05 PROCEDURE — 74183 MRI ABD W/O CNTR FLWD CNTR: CPT

## 2021-11-05 PROCEDURE — A9575 INJ GADOTERATE MEGLUMI 0.1ML: HCPCS | Performed by: FAMILY MEDICINE

## 2021-11-05 PROCEDURE — 74011250636 HC RX REV CODE- 250/636: Performed by: FAMILY MEDICINE

## 2021-11-05 RX ORDER — GADOTERATE MEGLUMINE 376.9 MG/ML
13 INJECTION INTRAVENOUS
Status: COMPLETED | OUTPATIENT
Start: 2021-11-05 | End: 2021-11-05

## 2021-11-05 RX ADMIN — GADOTERATE MEGLUMINE 13 ML: 376.9 INJECTION INTRAVENOUS at 11:04

## 2022-01-07 ENCOUNTER — HOSPITAL ENCOUNTER (OUTPATIENT)
Dept: PREADMISSION TESTING | Age: 72
Discharge: HOME | End: 2022-01-07
Payer: MEDICARE

## 2022-01-07 LAB
SARS-COV-2, XPLCVT: NOT DETECTED
SOURCE, COVRS: NORMAL

## 2022-01-07 PROCEDURE — U0005 INFEC AGEN DETEC AMPLI PROBE: HCPCS

## 2022-01-12 ENCOUNTER — ANESTHESIA (OUTPATIENT)
Dept: ENDOSCOPY | Age: 72
End: 2022-01-12
Payer: MEDICARE

## 2022-01-12 ENCOUNTER — HOSPITAL ENCOUNTER (OUTPATIENT)
Age: 72
Setting detail: OUTPATIENT SURGERY
Discharge: HOME | End: 2022-01-12
Attending: INTERNAL MEDICINE | Admitting: INTERNAL MEDICINE
Payer: MEDICARE

## 2022-01-12 ENCOUNTER — ANESTHESIA EVENT (OUTPATIENT)
Dept: ENDOSCOPY | Age: 72
End: 2022-01-12
Payer: MEDICARE

## 2022-01-12 VITALS
BODY MASS INDEX: 21.86 KG/M2 | DIASTOLIC BLOOD PRESSURE: 68 MMHG | OXYGEN SATURATION: 96 % | TEMPERATURE: 97.3 F | SYSTOLIC BLOOD PRESSURE: 143 MMHG | WEIGHT: 136 LBS | HEIGHT: 66 IN | HEART RATE: 93 BPM | RESPIRATION RATE: 16 BRPM

## 2022-01-12 PROCEDURE — 88307 TISSUE EXAM BY PATHOLOGIST: CPT

## 2022-01-12 PROCEDURE — 77030028690 HC NDL ASPIR ULTRSND BSC -E: Performed by: INTERNAL MEDICINE

## 2022-01-12 PROCEDURE — 77030003406 HC NDL ASPIR BIOP OCOA -C: Performed by: INTERNAL MEDICINE

## 2022-01-12 PROCEDURE — 88333 PATH CONSLTJ SURG CYTO XM 1: CPT

## 2022-01-12 PROCEDURE — 76040000008: Performed by: INTERNAL MEDICINE

## 2022-01-12 PROCEDURE — 74011250636 HC RX REV CODE- 250/636: Performed by: INTERNAL MEDICINE

## 2022-01-12 PROCEDURE — 74011250636 HC RX REV CODE- 250/636: Performed by: NURSE ANESTHETIST, CERTIFIED REGISTERED

## 2022-01-12 PROCEDURE — 77030019957 HC CUF BLN GASTSCP OCOA -B: Performed by: INTERNAL MEDICINE

## 2022-01-12 PROCEDURE — 77030036673 HC NDL BIOP ENDOSC SHRKCOR PRELD COVD -E: Performed by: INTERNAL MEDICINE

## 2022-01-12 PROCEDURE — 76060000033 HC ANESTHESIA 1 TO 1.5 HR: Performed by: INTERNAL MEDICINE

## 2022-01-12 PROCEDURE — 2709999900 HC NON-CHARGEABLE SUPPLY: Performed by: INTERNAL MEDICINE

## 2022-01-12 PROCEDURE — 74011000250 HC RX REV CODE- 250: Performed by: NURSE ANESTHETIST, CERTIFIED REGISTERED

## 2022-01-12 RX ORDER — MIDAZOLAM HYDROCHLORIDE 1 MG/ML
.25-5 INJECTION, SOLUTION INTRAMUSCULAR; INTRAVENOUS
Status: DISCONTINUED | OUTPATIENT
Start: 2022-01-12 | End: 2022-01-12 | Stop reason: HOSPADM

## 2022-01-12 RX ORDER — NALOXONE HYDROCHLORIDE 0.4 MG/ML
0.4 INJECTION, SOLUTION INTRAMUSCULAR; INTRAVENOUS; SUBCUTANEOUS
Status: DISCONTINUED | OUTPATIENT
Start: 2022-01-12 | End: 2022-01-12 | Stop reason: HOSPADM

## 2022-01-12 RX ORDER — SODIUM CHLORIDE 9 MG/ML
75 INJECTION, SOLUTION INTRAVENOUS CONTINUOUS
Status: DISCONTINUED | OUTPATIENT
Start: 2022-01-12 | End: 2022-01-12 | Stop reason: HOSPADM

## 2022-01-12 RX ORDER — SODIUM CHLORIDE 0.9 % (FLUSH) 0.9 %
5-40 SYRINGE (ML) INJECTION AS NEEDED
Status: DISCONTINUED | OUTPATIENT
Start: 2022-01-12 | End: 2022-01-12 | Stop reason: HOSPADM

## 2022-01-12 RX ORDER — ATROPINE SULFATE 0.1 MG/ML
0.5 INJECTION INTRAVENOUS
Status: DISCONTINUED | OUTPATIENT
Start: 2022-01-12 | End: 2022-01-12 | Stop reason: HOSPADM

## 2022-01-12 RX ORDER — GLYCOPYRROLATE 0.2 MG/ML
INJECTION INTRAMUSCULAR; INTRAVENOUS AS NEEDED
Status: DISCONTINUED | OUTPATIENT
Start: 2022-01-12 | End: 2022-01-12 | Stop reason: HOSPADM

## 2022-01-12 RX ORDER — SODIUM CHLORIDE 0.9 % (FLUSH) 0.9 %
5-40 SYRINGE (ML) INJECTION EVERY 8 HOURS
Status: DISCONTINUED | OUTPATIENT
Start: 2022-01-12 | End: 2022-01-12 | Stop reason: HOSPADM

## 2022-01-12 RX ORDER — DEXTROMETHORPHAN/PSEUDOEPHED 2.5-7.5/.8
1.2 DROPS ORAL
Status: DISCONTINUED | OUTPATIENT
Start: 2022-01-12 | End: 2022-01-12 | Stop reason: HOSPADM

## 2022-01-12 RX ORDER — LIDOCAINE HYDROCHLORIDE 20 MG/ML
INJECTION, SOLUTION EPIDURAL; INFILTRATION; INTRACAUDAL; PERINEURAL AS NEEDED
Status: DISCONTINUED | OUTPATIENT
Start: 2022-01-12 | End: 2022-01-12 | Stop reason: HOSPADM

## 2022-01-12 RX ORDER — PROPOFOL 10 MG/ML
INJECTION, EMULSION INTRAVENOUS AS NEEDED
Status: DISCONTINUED | OUTPATIENT
Start: 2022-01-12 | End: 2022-01-12 | Stop reason: HOSPADM

## 2022-01-12 RX ORDER — FLUMAZENIL 0.1 MG/ML
0.2 INJECTION INTRAVENOUS
Status: DISCONTINUED | OUTPATIENT
Start: 2022-01-12 | End: 2022-01-12 | Stop reason: HOSPADM

## 2022-01-12 RX ORDER — EPINEPHRINE 0.1 MG/ML
1 INJECTION INTRACARDIAC; INTRAVENOUS
Status: DISCONTINUED | OUTPATIENT
Start: 2022-01-12 | End: 2022-01-12 | Stop reason: HOSPADM

## 2022-01-12 RX ADMIN — PROPOFOL 50 MG: 10 INJECTION, EMULSION INTRAVENOUS at 13:22

## 2022-01-12 RX ADMIN — LIDOCAINE HYDROCHLORIDE 40 MG: 20 INJECTION, SOLUTION EPIDURAL; INFILTRATION; INTRACAUDAL; PERINEURAL at 12:58

## 2022-01-12 RX ADMIN — PROPOFOL 50 MG: 10 INJECTION, EMULSION INTRAVENOUS at 13:38

## 2022-01-12 RX ADMIN — PROPOFOL 50 MG: 10 INJECTION, EMULSION INTRAVENOUS at 13:13

## 2022-01-12 RX ADMIN — PROPOFOL 50 MG: 10 INJECTION, EMULSION INTRAVENOUS at 12:59

## 2022-01-12 RX ADMIN — PROPOFOL 50 MG: 10 INJECTION, EMULSION INTRAVENOUS at 13:02

## 2022-01-12 RX ADMIN — PROPOFOL 50 MG: 10 INJECTION, EMULSION INTRAVENOUS at 12:58

## 2022-01-12 RX ADMIN — PROPOFOL 50 MG: 10 INJECTION, EMULSION INTRAVENOUS at 13:18

## 2022-01-12 RX ADMIN — PROPOFOL 50 MG: 10 INJECTION, EMULSION INTRAVENOUS at 13:06

## 2022-01-12 RX ADMIN — PROPOFOL 50 MG: 10 INJECTION, EMULSION INTRAVENOUS at 13:26

## 2022-01-12 RX ADMIN — PROPOFOL 50 MG: 10 INJECTION, EMULSION INTRAVENOUS at 13:34

## 2022-01-12 RX ADMIN — PROPOFOL 20 MG: 10 INJECTION, EMULSION INTRAVENOUS at 13:42

## 2022-01-12 RX ADMIN — PROPOFOL 50 MG: 10 INJECTION, EMULSION INTRAVENOUS at 13:30

## 2022-01-12 RX ADMIN — PROPOFOL 50 MG: 10 INJECTION, EMULSION INTRAVENOUS at 13:10

## 2022-01-12 RX ADMIN — GLYCOPYRROLATE 0.1 MG: 0.2 INJECTION, SOLUTION INTRAMUSCULAR; INTRAVENOUS at 12:57

## 2022-01-12 RX ADMIN — SODIUM CHLORIDE 75 ML/HR: 9 INJECTION, SOLUTION INTRAVENOUS at 12:47

## 2022-01-12 NOTE — ROUTINE PROCESS
Connor Tao  1950  [id number]    Situation:  Verbal report received from: Gisela Franklin  Procedure: Procedure(s):  LINER UPPER EUS  FINE NEEDLE ASPIRATION    Background:    Preoperative diagnosis: MRCP, DIARRHEA, MASS OF PANCREAS, ELEVATED LIVER ENZYMES  Postoperative diagnosis: Mass suspicious of adenocarcinoma     :  Dr. Anisha Devine  Assistant(s): Endoscopy Technician-1: Connie Carvalho  Endoscopy RN-2: Lindsey Nieto    Specimens: * No specimens in log *  H. Pylori  no    Assessment:  Intra-procedure medications     Anesthesia gave intra-procedure sedation and medications, see anesthesia flow sheet yes    Intravenous fluids: NS@ KVO     Vital signs stable     Abdominal assessment: round and soft     Recommendation:  Discharge patient per MD order. Family   Permission to share finding with family or friend yes    Endoscopy discharge instructions have been reviewed and given to patient. The patient verbalized understanding and acceptance of instructions.

## 2022-01-12 NOTE — H&P
Pre-endoscopy H and P    The patient was seen and examined in the room/pre-op holding area. The airway was assessed and documented. The problem list, past medical history, and medications were reviewed. Patient Active Problem List   Diagnosis Code    Basal cell carcinoma, arm C44.611    Melanoma of forearm ([facility] [address] C43.60    Carotid stenosis, asymptomatic I65.29     Social History     Socioeconomic History    Marital status: SINGLE     Spouse name: Not on file    Number of children: Not on file    Years of education: Not on file    Highest education level: Not on file   Occupational History    Not on file   Tobacco Use    Smoking status: Never Smoker    Smokeless tobacco: Never Used   Substance and Sexual Activity    Alcohol use: No    Drug use: No    Sexual activity: Not on file   Other Topics Concern    Not on file   Social History Narrative    Not on file     Social Determinants of Health     Financial Resource Strain:     Difficulty of Paying Living Expenses: Not on file   Food Insecurity:     Worried About Running Out of Food in the Last Year: Not on file    Magaly of Food in the Last Year: Not on file   Transportation Needs:     Lack of Transportation (Medical): Not on file    Lack of Transportation (Non-Medical):  Not on file   Physical Activity:     Days of Exercise per Week: Not on file    Minutes of Exercise per Session: Not on file   Stress:     Feeling of Stress : Not on file   Social Connections:     Frequency of Communication with Friends and Family: Not on file    Frequency of Social Gatherings with Friends and Family: Not on file    Attends Rastafari Services: Not on file    Active Member of Clubs or Organizations: Not on file    Attends Club or Organization Meetings: Not on file    Marital Status: Not on file   Intimate Partner Violence:     Fear of Current or Ex-Partner: Not on file    Emotionally Abused: Not on file    Physically Abused: Not on file   [place] Sexually Abused: Not on file   Housing Stability:     Unable to Pay for Housing in the Last Year: Not on file    Number of [place] in the Last Year: Not on file    Unstable Housing in the Last Year: Not on file     Past Medical History:   Diagnosis Date    Arthritis     fingers    CAD (coronary artery disease)     Cancer ([facility] [address] 2013    melanoma - left forearm    Diabetes ([facility] [address]     Hypercholesterolemia     Hypertension     Thyroid disease     thyroidectomy         Prior to Admission Medications   Prescriptions Last Dose Informant Patient Reported? Taking? Cetirizine (ZYRTEC) 10 mg cap 2022 at Unknown time  Yes Yes   Sig: Take  by mouth daily. SITagliptin (JANUVIA) 100 mg tablet 1/10/2022  Yes No   Sig: Take 100 mg by mouth every evening. acetaminophen (TYLENOL) 325 mg tablet Not Taking at Unknown time  No No   Sig: Take 2 Tabs by mouth every four (4) hours as needed. Patient not taking: Reported on 2022   amLODIPine (NORVASC) 10 mg tablet 2022 at Unknown time  Yes Yes   Sig: Take  by mouth daily. aspirin delayed-release 81 mg tablet 2022  Yes No   Sig: Take 81 mg by mouth two (2) times a day. fluticasone (FLONASE ALLERGY RELIEF) 50 mcg/actuation nasal spray Not Taking at Unknown time  Yes No   Si Sprays by Both Nostrils route as needed for Rhinitis. Patient not taking: Reported on 2022   levothyroxine (SYNTHROID) 137 mcg tablet 2022 at Unknown time  Yes Yes   Sig: Take  by mouth Daily (before breakfast). lovastatin (MEVACOR) 40 mg tablet 2022 at Unknown time  Yes Yes   Sig: Take 40 mg by mouth nightly. metFORMIN (GLUCOPHAGE) 500 mg tablet 2022 at Unknown time  Yes Yes   Sig: Take 1,000 mg by mouth two (2) times daily (with meals). metoprolol (LOPRESSOR) 25 mg tablet 2022 at Unknown time  Yes Yes   Sig: Take 50 mg by mouth two (2) times a day.       Facility-Administered Medications: None           The review of systems is:  Negative  for shortness of breath or chest pain      The heart, lungs, and mental status were satisfactory for the administration of deep sedation and for the procedure. I discussed with the patient the objectives, risks, consequences and alternatives to the procedure.       Harper Reis MD  1/12/2022  12:45 PM

## 2022-01-12 NOTE — ANESTHESIA POSTPROCEDURE EVALUATION
Procedure(s):  LINER UPPER EUS  FINE NEEDLE ASPIRATION. MAC    Anesthesia Post Evaluation      Multimodal analgesia: multimodal analgesia used between 6 hours prior to anesthesia start to PACU discharge  Patient location during evaluation: PACU  Patient participation: complete - patient participated  Level of consciousness: awake and alert  Pain management: adequate  Airway patency: patent  Anesthetic complications: no  Cardiovascular status: acceptable, hemodynamically stable and blood pressure returned to baseline  Respiratory status: acceptable and room air  Hydration status: euvolemic  Post anesthesia nausea and vomiting:  none  Final Post Anesthesia Temperature Assessment:  Normothermia (36.0-37.5 degrees C)      INITIAL Post-op Vital signs:   Vitals Value Taken Time   /68 01/12/22 1412   Temp 36.3 °C (97.3 °F) 01/12/22 1358   Pulse 94 01/12/22 1413   Resp 41 01/12/22 1414   SpO2 97 % 01/12/22 1414   Vitals shown include unvalidated device data.

## 2022-01-12 NOTE — PERIOP NOTES
Anesthesia reports 610mg Propofol, 60mg Lidocaine and 900mL NS given during procedure. Received report from anesthesia staff on vital signs and status of patient.     Endoscope was pre-cleaned at the bedside immediately following procedure by YANA Frank

## 2022-01-12 NOTE — DISCHARGE INSTRUCTIONS
[place] Office: [phone]    Mitch Hogan  [id number]  1950    EGD/COLONOSCOPY DISCHARGE INSTRUCTIONS  Discomfort:  Sore throat- throat lozenges or warm salt water gargle  redness at IV site- apply warm compress to area; if redness or soreness persist- contact your physician  Gaseous discomfort- walking, belching will help relieve any discomfort  You may not operate a vehicle for 12 hours  You may not engage in an occupation involving machinery or appliances for rest of today. You may not drink alcoholic beverages for at least 12 hours  Avoid making any critical decisions for at least 24 hour  DIET  You may resume your regular diet - however -  remember your colon is empty and a heavy meal will produce gas. Avoid these foods:  fried / greasy foods, excessive carbonated drinks or too much caffeine  MEDICATIONS   Regarding Aspirin or Nonsteroidal medications specifically, please see below. ACTIVITY  You may resume your normal daily activities. Spend the remainder of the day resting -  avoid any strenuous activity. CALL M.D. ANY SIGN OF   Increasing pain, nausea, vomiting  Abdominal distension (swelling)  New increased bleeding (oral or rectal)  Fever (chills)  Pain in chest area  Bloody discharge from nose or mouth  Shortness of breath    You may not take any Advil, Aspirin, Ibuprofen, Motrin, Aleve, or Goodys for 7 days, ONLY  Tylenol as needed for pain. Follow-up Instructions:   Call  Alex Palencia MD for any questions or concerns  Results of procedure / biopsy in 7 days   Telephone # [phone]      Follow-up Information    None

## 2022-01-12 NOTE — ANESTHESIA PREPROCEDURE EVALUATION
Relevant Problems   PERSONAL HX & FAMILY HX OF CANCER   (+) Basal cell carcinoma, arm   (+) Melanoma of forearm (HCC)       Anesthetic History   No history of anesthetic complications            Review of Systems / Medical History  Patient summary reviewed, nursing notes reviewed and pertinent labs reviewed    Pulmonary  Within defined limits                 Neuro/Psych   Within defined limits           Cardiovascular    Hypertension: well controlled          CAD    Exercise tolerance: >4 METS     GI/Hepatic/Renal  Within defined limits              Endo/Other    Diabetes: type 2  Hypothyroidism: well controlled  Arthritis     Other Findings   Comments: Hypercholesterolemia  Diabetes (HCC)  Hypertension  CAD (coronary artery disease)  Thyroid disease  Cancer ([facility] [address]  Arthritis           Physical Exam    Airway  Mallampati: II  TM Distance: 4 - 6 cm  Neck ROM: normal range of motion   Mouth opening: Normal     Cardiovascular  Regular rate and rhythm,  S1 and S2 normal,  no murmur, click, rub, or gallop  Rhythm: regular  Rate: normal         Dental  No notable dental hx       Pulmonary  Breath sounds clear to auscultation               Abdominal  GI exam deferred       Other Findings            Anesthetic Plan    ASA: 3  Anesthesia type: MAC          Induction: Intravenous  Anesthetic plan and risks discussed with: Patient

## 2022-01-12 NOTE — PROCEDURES
NAME:  Sandrita Fry   :   1950   MRN:   [id number]     [facility]    Date/Time:  2022   Procedure Type: EUS FNB pancreas mass    Indications: Pancreatic mass    Pre-operative Diagnosis: see indication above    Post-operative Diagnosis:  See findings below    : Rhea Holland MD    Referring Provider: Madison Watkins MD    Procedure Details:    Exam:  Airway: clear, no airway problems anticipated  Heart: RRR, without gallops or rubs  Lungs: clear bilaterally without wheezes, crackles, or rhonchi  Abdomen: soft, nontender, nondistended, bowel sounds present  Mental Status: awake, alert and oriented to person, place and time     Anethesia/Sedation:  MAC anesthesia Propofol      Procedure Details   After a detailed informed consent was obtained for the procedure, with all risks and benefits of procedure explained the patient was taken to the endoscopy suite and placed in the left lateral decubitus position. Following sequential administration of sedation as per above, the linear echoendoscope was inserted into the mouth and advanced under direct vision to second portion of the duodenum. A careful inspection was made as the gastroscope was withdrawn, including a retroflexed view of the proximal stomach; findings and interventions are described below. Findings:     Endoscopic:-normal esophagus, stomach, and duodenum    Ultrasound:   Esophagus: normal findings   Stomach: normal findings   Pancreas:     Areas examined: the entire gland    Parenchyma: -tumor/mass, located in  the body 21 x 18 mm, hypoechoic/irregular,  with obstructed dilated/PD, Atrophy of tail. It abuts the splenic vein. PD in the mass is not visible. Using a 22 Gauge TelensiusIDIAN Shark Core needle, 3 FNBs taken. LOLA done. Pathologist wanted more tissue. I then used a BS Acquire 25 gauge FNB needle with 2 core specimens.     Pancreatic Duct: obstructed  the body    Liver:     Parenchyma: normal    Gallbladder: not seen    Bile Duct: the common bile duct is dilated to 17 mm. ? Sludge noted. Cystic duct is prominent and torturous. Lymph Node: none seen            Complications: None. EBL:  None. Interventions: Fine needle biopsies performed of the pancreas  body mass using a 22 and 25 gauge needle with 5 pass/es with preliminary results suggesting malignancy. Recommendations:     Await final pathology. CA 19-9,  LFTs, Amylase/lipase as suggested. Office visit as per plan. Canelo Stack MD

## 2022-01-17 ENCOUNTER — TELEPHONE (OUTPATIENT)
Dept: SURGERY | Age: 72
End: 2022-01-17

## 2022-01-17 NOTE — TELEPHONE ENCOUNTER
Called patient to schedule appointment, pt stated she has an appointment with Dr. Veronica Gibson on 1/24/22 and will call our office back if she need to schedule with Dr. Davon Boo.

## 2022-01-21 ENCOUNTER — TRANSCRIBE ORDER (OUTPATIENT)
Dept: SCHEDULING | Age: 72
End: 2022-01-21

## 2022-01-21 DIAGNOSIS — R74.8 ELEVATED LIVER ENZYMES: ICD-10-CM

## 2022-01-21 DIAGNOSIS — R93.3 ABNORMAL MAGNETIC RESONANCE CHOLANGIOPANCREATOGRAPHY (MRCP): Primary | ICD-10-CM

## 2022-01-21 DIAGNOSIS — R19.7 DIARRHEA, UNSPECIFIED TYPE: ICD-10-CM

## 2022-01-21 DIAGNOSIS — C25.9 ADENOCARCINOMA OF PANCREAS (HCC): ICD-10-CM

## 2022-01-21 DIAGNOSIS — K86.89 MASS OF PANCREAS: ICD-10-CM

## 2022-01-21 NOTE — PROGRESS NOTES
Marce Angel is a 66 y.o. female here for new patient appt for pancreatic cancer. Referred by Alok Ambriz. Pt has no pain. No concerns brought up. Pt here alone today. Pt lives alone. 1. Have you been to the ER, urgent care clinic since your last visit? Hospitalized since your last visit? New Pt    2. Have you seen or consulted any other health care providers outside of the [address] [place] [address] since your last visit? Include any pap smears or colon screening.  New Pt

## 2022-01-24 ENCOUNTER — OFFICE VISIT (OUTPATIENT)
Dept: ONCOLOGY | Age: 72
End: 2022-01-24
Payer: MEDICARE

## 2022-01-24 ENCOUNTER — DOCUMENTATION ONLY (OUTPATIENT)
Dept: ONCOLOGY | Age: 72
End: 2022-01-24

## 2022-01-24 VITALS
BODY MASS INDEX: 21.69 KG/M2 | HEART RATE: 69 BPM | OXYGEN SATURATION: 96 % | TEMPERATURE: 98.2 F | SYSTOLIC BLOOD PRESSURE: 145 MMHG | WEIGHT: 135 LBS | DIASTOLIC BLOOD PRESSURE: 61 MMHG | HEIGHT: 66 IN

## 2022-01-24 DIAGNOSIS — C25.9 PANCREATIC ADENOCARCINOMA (HCC): Primary | ICD-10-CM

## 2022-01-24 PROCEDURE — G8400 PT W/DXA NO RESULTS DOC: HCPCS | Performed by: INTERNAL MEDICINE

## 2022-01-24 PROCEDURE — 3017F COLORECTAL CA SCREEN DOC REV: CPT | Performed by: INTERNAL MEDICINE

## 2022-01-24 PROCEDURE — G8536 NO DOC ELDER MAL SCRN: HCPCS | Performed by: INTERNAL MEDICINE

## 2022-01-24 PROCEDURE — 1090F PRES/ABSN URINE INCON ASSESS: CPT | Performed by: INTERNAL MEDICINE

## 2022-01-24 PROCEDURE — G8427 DOCREV CUR MEDS BY ELIG CLIN: HCPCS | Performed by: INTERNAL MEDICINE

## 2022-01-24 PROCEDURE — G8432 DEP SCR NOT DOC, RNG: HCPCS | Performed by: INTERNAL MEDICINE

## 2022-01-24 PROCEDURE — G8420 CALC BMI NORM PARAMETERS: HCPCS | Performed by: INTERNAL MEDICINE

## 2022-01-24 PROCEDURE — 99205 OFFICE O/P NEW HI 60 MIN: CPT | Performed by: INTERNAL MEDICINE

## 2022-01-24 PROCEDURE — 1101F PT FALLS ASSESS-DOCD LE1/YR: CPT | Performed by: INTERNAL MEDICINE

## 2022-01-24 NOTE — PROGRESS NOTES
[address] [facility] [address], [address] [facility], [address] [place] [address]  [phone]       Oncology Note        Patient: Yuko Hoffman MRN: [id number]  SSN: [id number]    YOB: 1950  Age: 66 y.o. Sex: female      Subjective: Yuko Hoffman is a 66 y.o. female who has been sent for evaluation and management of newly recognized pancreatic adenocarcinoma. She was noted to have high AST/ALT in routine labs by her PCP. She underwent a MRI/MRCP. She was noted to have a mass in the pancreatic head. She was seen by Dr. Aida Faria. EUS established a diagnosis of pancreatic adenocarcinoma. She denies weight loss, jaundice, abdominal pain.        Review of Systems:    Constitutional: negative  Eyes: negative  Ears, Nose, Mouth, Throat, and Face: negative  Respiratory: negative  Cardiovascular: negative  Gastrointestinal: negative  Genitourinary:negative  Integument/Breast: negative  Hematologic/Lymphatic: negative  Musculoskeletal:negative  Neurological: negative        Past Medical History:   Diagnosis Date    Arthritis     fingers    CAD (coronary artery disease)     Cancer ([address] 2013    melanoma - left forearm    Cancer [facility] 2022    pancreatic    Diabetes ([facility]     Hypercholesterolemia     Hypertension     Thyroid disease     thyroidectomy     Past Surgical History:   Procedure Laterality Date    HX CHOLECYSTECTOMY  1990's    HX CORONARY ARTERY BYPASS GRAFT  06/26/09    x3    HX OTHER SURGICAL  2007    thyroidectomy     HX OTHER SURGICAL  07-    wide reexcision of left forearm intermediate melanoma    HX THYROIDECTOMY  2006    HX TONSILLECTOMY      as a child    [place] CABG, ARTERY-VEIN, THREE  2009    bypass      Family History   Problem Relation Age of Onset    Heart Disease Mother     Lung Disease Father         rare form of pneumonia    No Known Problems Brother      Social History     Tobacco Use    Smoking status: Never Smoker    Smokeless tobacco: Never Used   Substance Use Topics    Alcohol use: No      Prior to Admission medications    Medication Sig Start Date End Date Taking? Authorizing Provider   SITagliptin (JANUVIA) 100 mg tablet Take 100 mg by mouth every evening. Yes Provider, Historical   aspirin delayed-release 81 mg tablet Take 81 mg by mouth two (2) times a day. Yes Provider, Historical   Cetirizine (ZYRTEC) 10 mg cap Take  by mouth daily. Yes Provider, Historical   amLODIPine (NORVASC) 10 mg tablet Take  by mouth daily. Yes Provider, Historical   metoprolol (LOPRESSOR) 25 mg tablet Take 50 mg by mouth two (2) times a day. Yes Provider, Historical   lovastatin (MEVACOR) 40 mg tablet Take 40 mg by mouth nightly. Yes Provider, Historical   metFORMIN (GLUCOPHAGE) 500 mg tablet Take 1,000 mg by mouth two (2) times daily (with meals). Yes Provider, Historical   levothyroxine (SYNTHROID) 137 mcg tablet Take  by mouth Daily (before breakfast). Yes Provider, Historical   acetaminophen (TYLENOL) 325 mg tablet Take 2 Tabs by mouth every four (4) hours as needed. Patient not taking: Reported on 1/12/2022 10/12/18   Jorge Duque NP   fluticasone [facility] ALLERGY RELIEF) 50 mcg/actuation nasal spray 2 Sprays by Both Nostrils route as needed for Rhinitis. Patient not taking: Reported on 1/12/2022    Provider, Historical              Allergies   Allergen Reactions    Beta-Blockers (Beta-Adrenergic Blocking Agts) Swelling     Patient was told that she swelled from Beta blockers    Codeine Nausea and Vomiting           Objective:     Vitals:    01/24/22 1434   BP: (!) 145/61   Pulse: 69   Temp: 98.2 °F (36.8 °C)   TempSrc: Temporal   SpO2: 96%   Weight: 135 lb (61.2 kg)   Height: 5' 6\" (1.676 m)            Physical Exam:    GENERAL: alert, cooperative, no distress, appears stated age  EYE: conjunctivae/corneas clear.  PERRL  LYMPHATIC: Cervical, supraclavicular, and axillary nodes normal. THROAT & NECK: normal and no erythema or exudates noted. LUNG: clear to auscultation bilaterally  HEART: regular rate and rhythm, S1, S2 normal, no murmur, click, rub or gallop  ABDOMEN: soft, non-tender. Bowel sounds normal. No masses,  no organomegaly  EXTREMITIES:  extremities normal, atraumatic, no cyanosis or edema  SKIN: Normal.  NEUROLOGIC: AOx3. Gait normal. Reflexes and motor strength normal and symmetric. Cranial nerves 2-12 and sensation grossly intact. MRI Results (most recent):  Results from [place] encounter on 11/05/21    MRI ABD W WO CONT    Narrative  Procedure: MRI of the abdomen with MRCP    DATE: 11/5/2021 11:14 AM    TECHNIQUE: Multiplanar MRI of the abdomen was performed with and without  contrast including T2-weighted fat-sat and nonfat sat images, axial in phase and  out of phase imaging, and multiphase postcontrast imaging as well as 3-D MRCP. Contrast: 13 mL of Dotarem    COMPARISON: Ultrasound performed on 10/14/2021    INDICATION: Pancreatic mass. Pancreatitis    FINDINGS:    Liver: No focal liver lesions are identified. Hepatic steatosis    Gallbladder: Surgically absent    Biliary tree: Common bile duct is dilated with intrahepatic biliary dilatation. The common bile duct measures 15 mm. The cystic duct remnant is dilated. The  proximal pancreatic duct is normal in caliber. The distal pancreatic duct is  markedly dilated. Question small amount of sludge in the distal duct. Spleen: Unremarkable    Pancreas: Mass in the body the pancreas measuring 17 x 17 mm with distal  pancreatic atrophy and ductal dilatation as well as side branch ectasia. The  lesion is relatively hypointense on T2-weighted images    Adrenals: Unremarkable    Kidneys: Small T2 hyperintense lesion in the left kidney too small to  characterize nonenhancing    Vascular: Unremarkable    Soft tissues and osseous structures: Unremarkable    Impression  1.   Pancreatic mass in the body the pancreas with distal cardiac atrophy and  ductal dilatation. This may be accessible by EUS through the stomach    2.  Status post cholecystectomy. Dilated common bile duct with intrahepatic  biliary dilatation and cystic duct remnant dilatation as well. Questionable  small amount of sludge or filling defect in the distal common duct. Recommend  correlation with serum alkaline phosphatase. This is somewhat greater than  expected for physiologic ductal dilatation. [email or web address]    [id number]          Assessment:     1. Pancreatic adenocarcinoma   T1 N0 M0 (Stage I)    ECOG PS 1  Intent of therapy - curative  Prognosis - fair     I spent 65 minute with the patient in a face-to-face encounter. I explained her the stage of the disease, pathophysiology of the disease and the treatment approaches. I answered all her questions. More than 50% of the time was utilized in education, counseling and co-ordination of care. The patient may be a candidate surgical resection. She is seeing Dr. Avery Herrera. Adjuvant chemotherapy has a Category 1 recommendation in the NCCN guidelines. I told her basics of adjuvant treatment. She would be best served by a combination of [place]. I would meet with her after surgery to discuss in more details about the pros and cons of adjuvant treatment. Plan:       > Discuss the patient in the tumor board  > Surgery per Dr. Avery Herrera  > Return in 4 weeks      Signed By: Speedy Clemons MD     January 24, 2022           CC. Samuel Nash MD  CC. Misbah Chua MD  CC.  Tanya Aviles MD

## 2022-01-24 NOTE — PROGRESS NOTES
Oncology Social Work  Psychosocial Assessment    Reason for Assessment:      [] Social Work Referral [x] Initial Assessment [x] New Diagnosis [] Other    [facility] [address] 10/11/2018   Patient's [address] is: Legal Next of Pat Ross   Primary Decision Maker Name Lea Zelaya   Primary Decision Maker Phone Number [phone]   Primary Decision Maker Relationship to Patient Sibling   Confirm Advance Directive None   Patient Would Like to Complete Advance Directive No       Sources of Information:    [x]Patient  []Family  [x]Staff  [x]Medical Record    Mental Status:    [x]Alert  []Lethargic  []Unresponsive   [] Unable to assess   Oriented to:  [x]Person  [x]Place  [x]Time  [x]Situation      Relationship Status:  [x]Single  []  []Significant Other/Life Partner  []  []  []    Living Circumstances:  [x]Lives Alone  []Family/Significant Other in Household  []Roommates  []Children in the Home  []Paid Caregivers  []Assisted Living Facility/Group [address]  []Homeless  []Incarcerated  []Environmental/Care Concerns  []Other:    Employment Status:  []Employed Full-time []Employed Part-time []Homemaker  [x] Retired [] Short-Term Disability [] Long-Term Disability  [] Unemployed   [] [place]   [] SSDI  [] Self-Employment    Barriers to Learning:    []Language  []Developmental  []Cognitive  []Altered Mental Status  []Visual/Hearing Impairment  []Unable to Read/Write  []Motivational  [] Challenges Understanding Medical Jargon [x]No Barriers Identified      Financial/Legal Concerns:    []Uninsured  [x]Limited Income/Resources  []Non-Citizen  []Food Insecurity [x]No Concerns Identified   []Other:    Caodaism/Spiritual/Existential:  Does patient have any spiritual or Advent beliefs? [] Yes [x] No  Is the patient involved in a spiritual, bianka or Advent community?  [] Yes [x] No  Patient expressing spiritual/existential angst? [] Yes [x] No  Notes:    Support System:    Identified Support Person/Group:  []Strong  []Fair  [x]Limited    Coping with Illness:   [x]  Coping Well  [] Challenges Coping with Serious Illness [] Situational Depression [] Situational Anxiety [] Anticipatory Grief  [] Recent Loss [] Caregiver [place]            Narrative:  Met with patient to introduce social work navigator role and supports. Pt single,  Never , no children, 1 brother who lives about 1 mile away from her. Pt retired from [employer]. Pt will see Dr. Isabelle Hdz and surgeon and Med oncologist will discuss plan of care. Pt states she will stay positive. Plan:   1. Introduce self and role of the  in the [address] [place] [address] Dr. 2. Informed the patient of the [facility] and available resources there. 3. Continue to meet with the patient when  returns to the clinic for ongoing assessment of the patient's adjustment to  diagnosis and treatment. 4. Ongoing psychosocial support as desired by patient. Referral/Handouts: l  Complementary therapies referral  Insurance/Entitlements referral  Financial/Medication assistance referral     RACHEL Mccormack/LALO  Supervisee in Social Work

## 2022-01-24 NOTE — LETTER
1/24/2022    Patient: Gabe Cárdenas   YOB: 1950   Date of Visit: 1/24/2022     Khanh Sow MD  [address] [place] [address]  Via Fax: [phone]    Dear Khanh Sow MD,      Thank you for referring Ms. Frida Winter to [address] [place] [address] for evaluation. My notes for this consultation are attached. If you have questions, please do not hesitate to call me. I look forward to following your patient along with you.       Sincerely,    Josie Steele MD

## 2022-01-28 ENCOUNTER — TELEPHONE (OUTPATIENT)
Dept: SURGERY | Age: 72
End: 2022-01-28

## 2022-01-28 ENCOUNTER — OFFICE VISIT (OUTPATIENT)
Dept: SURGERY | Age: 72
End: 2022-01-28
Payer: MEDICARE

## 2022-01-28 DIAGNOSIS — C25.9 PANCREATIC ADENOCARCINOMA (HCC): Primary | ICD-10-CM

## 2022-01-28 PROCEDURE — G8510 SCR DEP NEG, NO PLAN REQD: HCPCS | Performed by: SURGERY

## 2022-01-28 PROCEDURE — 1090F PRES/ABSN URINE INCON ASSESS: CPT | Performed by: SURGERY

## 2022-01-28 PROCEDURE — G8536 NO DOC ELDER MAL SCRN: HCPCS | Performed by: SURGERY

## 2022-01-28 PROCEDURE — G8427 DOCREV CUR MEDS BY ELIG CLIN: HCPCS | Performed by: SURGERY

## 2022-01-28 PROCEDURE — G8400 PT W/DXA NO RESULTS DOC: HCPCS | Performed by: SURGERY

## 2022-01-28 PROCEDURE — 3017F COLORECTAL CA SCREEN DOC REV: CPT | Performed by: SURGERY

## 2022-01-28 PROCEDURE — G8420 CALC BMI NORM PARAMETERS: HCPCS | Performed by: SURGERY

## 2022-01-28 PROCEDURE — 99205 OFFICE O/P NEW HI 60 MIN: CPT | Performed by: SURGERY

## 2022-01-28 PROCEDURE — 1101F PT FALLS ASSESS-DOCD LE1/YR: CPT | Performed by: SURGERY

## 2022-01-28 NOTE — TELEPHONE ENCOUNTER
SPOKE WITH PATIENT USING 2 IDENTIFIERS ( NAME &   ). INFORMED HER CT SCAN SCHEDULED 22 @ 3:30 pm with arrival time 3:00 pm to MOB 1. No solids after 11:30 am & no NSAIDs. Express understanding.

## 2022-01-28 NOTE — PROGRESS NOTES
Identified pt with two pt identifiers(name and ). Reviewed record in preparation for visit and have obtained necessary documentation. All patient medications has been reviewed. Chief Complaint   Patient presents with    Mass     seen at the request of Dr. Alok irizarry pancreatic mass        Health Maintenance Due   Topic    Hepatitis C Screening     Lipid Screen     DTaP/Tdap/Td series (1 - Tdap)    Colorectal Cancer Screening Combo     Shingrix Vaccine Age 50> (1 of 2)    Breast Cancer Screen Mammogram     Bone Densitometry (Dexa) Screening     Pneumococcal 65+ years (1 of 1 - PPSV23)    Medicare Yearly Exam     Flu Vaccine (1)    COVID-19 Vaccine (2 - Pfizer 3-dose series)       There were no vitals filed for this visit. 4.Have you been to the ER, urgent care clinic since your last visit? Hospitalized since your last visit? No    5. Have you seen or consulted any other health care providers outside of the [address] [place] [address] since your last visit? Include any pap smears or colon screening. No      Patient is accompanied by self I have received verbal consent from Marce Angel to discuss any/all medical information while they are present in the room.

## 2022-01-28 NOTE — Clinical Note
2/27/2022    Patient: Jaz Mackay   YOB: 1950   Date of Visit: 1/28/2022     Phillip Hodge MD  [address] [address]  [address] [address]  Via Fax: [phone]     Franci Gandara, [address] [address]  [address] [address]  Via In [facility] [address]    Dear MD Franci Waite MD,      [facility]. My notes for this consultation are attached. If you have questions, please do not hesitate to call me. I look forward to following your patient along with you.       Sincerely,    Paul Florian MD

## 2022-02-01 ENCOUNTER — HOSPITAL ENCOUNTER (OUTPATIENT)
Dept: CT IMAGING | Age: 72
Discharge: HOME | End: 2022-02-01
Attending: SURGERY
Payer: MEDICARE

## 2022-02-01 DIAGNOSIS — C25.9 PANCREATIC ADENOCARCINOMA (HCC): ICD-10-CM

## 2022-02-01 LAB — CREAT BLD-MCNC: 0.7 MG/DL (ref 0.6–1.3)

## 2022-02-01 PROCEDURE — 82565 ASSAY OF CREATININE: CPT

## 2022-02-01 PROCEDURE — 74170 CT ABD WO CNTRST FLWD CNTRST: CPT

## 2022-02-01 PROCEDURE — 74011000636 HC RX REV CODE- 636: Performed by: SURGERY

## 2022-02-01 RX ADMIN — IOPAMIDOL 100 ML: 755 INJECTION, SOLUTION INTRAVENOUS at 16:34

## 2022-02-09 ENCOUNTER — TELEPHONE (OUTPATIENT)
Dept: SURGERY | Age: 72
End: 2022-02-09

## 2022-02-09 NOTE — TELEPHONE ENCOUNTER
Called patient to review CAT scan findings. It looks like she probably would be a candidate for pancreaticoduodenectomy. I will bring her in on Friday to discuss the proposed operation, its risks and benefits, and the alternatives.

## 2022-02-09 NOTE — TELEPHONE ENCOUNTER
Spoke with patient using 2 patient identifiers, name & . Informed patient provider is in surgery, will relay message to him. Express understanding.

## 2022-02-11 ENCOUNTER — OFFICE VISIT (OUTPATIENT)
Dept: SURGERY | Age: 72
End: 2022-02-11
Payer: MEDICARE

## 2022-02-11 VITALS
SYSTOLIC BLOOD PRESSURE: 149 MMHG | HEIGHT: 66 IN | RESPIRATION RATE: 12 BRPM | OXYGEN SATURATION: 99 % | TEMPERATURE: 97.3 F | BODY MASS INDEX: 22.02 KG/M2 | HEART RATE: 69 BPM | WEIGHT: 137 LBS | DIASTOLIC BLOOD PRESSURE: 67 MMHG

## 2022-02-11 DIAGNOSIS — C25.9 PANCREATIC ADENOCARCINOMA (HCC): Primary | ICD-10-CM

## 2022-02-11 PROCEDURE — G8536 NO DOC ELDER MAL SCRN: HCPCS | Performed by: SURGERY

## 2022-02-11 PROCEDURE — 99213 OFFICE O/P EST LOW 20 MIN: CPT | Performed by: SURGERY

## 2022-02-11 PROCEDURE — G8420 CALC BMI NORM PARAMETERS: HCPCS | Performed by: SURGERY

## 2022-02-11 PROCEDURE — 3017F COLORECTAL CA SCREEN DOC REV: CPT | Performed by: SURGERY

## 2022-02-11 PROCEDURE — G8400 PT W/DXA NO RESULTS DOC: HCPCS | Performed by: SURGERY

## 2022-02-11 PROCEDURE — G8510 SCR DEP NEG, NO PLAN REQD: HCPCS | Performed by: SURGERY

## 2022-02-11 PROCEDURE — G8427 DOCREV CUR MEDS BY ELIG CLIN: HCPCS | Performed by: SURGERY

## 2022-02-11 PROCEDURE — 1101F PT FALLS ASSESS-DOCD LE1/YR: CPT | Performed by: SURGERY

## 2022-02-11 PROCEDURE — 1090F PRES/ABSN URINE INCON ASSESS: CPT | Performed by: SURGERY

## 2022-02-11 NOTE — PROGRESS NOTES
Identified pt with two pt identifiers(name and ). Reviewed record in preparation for visit and have obtained necessary documentation. Chief Complaint   Patient presents with    Results     CT results        Health Maintenance Due   Topic    Hepatitis C Screening     Lipid Screen     DTaP/Tdap/Td series (1 - Tdap)    Colorectal Cancer Screening Combo     Shingrix Vaccine Age 50> (1 of 2)    Breast Cancer Screen Mammogram     Bone Densitometry (Dexa) Screening     Pneumococcal 65+ years (1 of 1 - PPSV23)    Medicare Yearly Exam     Flu Vaccine (1)    COVID-19 Vaccine (2 - Pfizer 3-dose series)     Vitals:    22 0824 22 0827   BP: (!) 145/63 (!) 149/67   Pulse: 69    Resp: 12    Temp: 97.3 °F (36.3 °C)    TempSrc: Oral    SpO2: 99%    Weight: 62.1 kg (137 lb)    Height: 5' 6\" (1.676 m)    PainSc:   0 - No pain        Pain Scale: 0 - No pain/10    Coordination of Care Questionnaire:  :     1. Have you been to the ER, urgent care clinic since your last visit? Hospitalized since your last visit? No    2. Have you seen or consulted any other health care providers outside of the [address] [place] [address] since your last visit? Include any pap smears or colon screening.  No

## 2022-02-11 NOTE — Clinical Note
2/27/2022    Patient: Jayden Singleton   YOB: 1950   Date of Visit: 2/11/2022     Marco Hartman MD  [address] [address]  [address] [address]  Via Fax: [phone]     Agueda Cervantes, [address] [address]  [address] [phone]  Via In [facility] [address]    Dear MD Agueda Cervantes MD,      Thank you for referring Ms. Wil Warren to [address] [place] [address] for evaluation. My notes for this consultation are attached. If you have questions, please do not hesitate to call me. I look forward to following your patient along with you.       Sincerely,    Amber Milligan MD

## 2022-02-25 NOTE — PROGRESS NOTES
Connor Tao is a 66 y.o. female here for one month follow up for pancreatic cancer. No concerns brought up. 1. Have you been to the ER, urgent care clinic since your last visit? Hospitalized since your last visit? no    2. Have you seen or consulted any other health care providers outside of the [address] [place] [address] since your last visit? Include any pap smears or colon screening.  no

## 2022-02-27 NOTE — PROGRESS NOTES
To: Dr. Marshall Rubin  CC:  Hilton Anderson MD, Tomy Castro MD    From: Mandeep Tenorio MD    Thank you for sending Joleen Kim to see us. Please note that this dictation was completed with [employer], the computer voice recognition software. Quite often unanticipated grammatical, syntax, homophones, and other interpretive errors are inadvertently transcribed by the software. Please disregard these errors. Please excuse any errors that have escaped final proofreading. Encounter Date: 1/28/2022  History and Physical    Assessment:   Pancreatic cancer. MRCP imaging is not ideal in terms of localizing the tumor precisely. Dr. Velma Huang EUS notes it to be in the body and then the tail is atrophic. Comorbid hypertension, hypothyroidism, diabetes. Coronary artery disease status post CABG x3 2009. S/p prior cholecystectomy. Resection of melanoma on the left arm. Good functional status. Takes a daily baby aspirin but no other anticoagulants. Plan/Recommendations/Medical Decision Making: Will need to get a pancreas protocol CT in order to better visualize the mass. The MRI report states that it is in the body of the pancreas. If this is the case, Whipple would leave very little pancreas and total pancreatectomy may be worthwhile. If it is located closer to the tail we may be able to do distal pancreatectomy. Need to check a CA 19-9. After we get the pancreas protocol CT, we will present at tumor board. In the meantime, we will need to get in touch with Dr. Stephane Munoz and she will need to undergo preoperative evaluation so we can better understand her surgical risk. HPI:   Patient is a 66 y.o. female who is seen in consultation at the request of Dr. Marshall Rubin for pancreatic adenocarcinoma. She was not feeling well and not eating as much as she usually did and she was noted by Dr. Soco Pabon to have elevated LFTs. She had had prior cholecystectomy and he therefore ordered an MRI.   This showed a 17 mm mass in the body of the pancreas with distal pancreatic atrophy and marked ductal dilatation. She then saw Dr. Erika Campuzano and underwent endoscopic ultrasound with FNA which demonstrated adenocarcinoma. She tells me that she has not lost much weight despite not eating as much as usual.  She has no epigastric pain and no mid back pain. Does not appear that her CA 19-9 has been checked thus far. She has a history of melanoma on her left forearm and notes that there is a red spot near that scar. Past Medical History:   Diagnosis Date    Arthritis     fingers    CAD (coronary artery disease)     Cancer ([address] 2013    melanoma - left forearm    Cancer ([address] 2022    pancreatic    [facility]     Hypercholesterolemia     Hypertension     Thyroid disease     thyroidectomy      Past Surgical History:   Procedure Laterality Date    HX CHOLECYSTECTOMY  1990's    HX CORONARY ARTERY BYPASS GRAFT  06/26/09    x3    HX OTHER SURGICAL  2007    thyroidectomy     HX OTHER SURGICAL  07-    wide reexcision of left forearm intermediate melanoma    HX THYROIDECTOMY  2006    HX TONSILLECTOMY      as a child    [place] CABG, ARTERY-VEIN, THREE  2009    bypass     Social History     Tobacco Use    Smoking status: Never Smoker    Smokeless tobacco: Never Used   Substance Use Topics    Alcohol use: No      Family History   Problem Relation Age of Onset    Heart Disease Mother     Lung Disease Father         rare form of pneumonia    No Known Problems Brother        Current Outpatient Medications   Medication Sig    SITagliptin (JANUVIA) 100 mg tablet Take 100 mg by mouth every evening.  aspirin delayed-release 81 mg tablet Take 81 mg by mouth two (2) times a day.  Cetirizine (ZYRTEC) 10 mg cap Take  by mouth daily.  amLODIPine (NORVASC) 10 mg tablet Take  by mouth daily.  metoprolol (LOPRESSOR) 25 mg tablet Take 50 mg by mouth two (2) times a day.     lovastatin (MEVACOR) 40 mg tablet Take 40 mg by mouth nightly.  metFORMIN (GLUCOPHAGE) 500 mg tablet Take 1,000 mg by mouth two (2) times daily (with meals).  levothyroxine (SYNTHROID) 137 mcg tablet Take  by mouth Daily (before breakfast).  naproxen sodium (ALEVE PO) Take  by mouth as needed.  acetaminophen (TYLENOL) 325 mg tablet Take 2 Tabs by mouth every four (4) hours as needed. (Patient not taking: Reported on 1/12/2022)    fluticasone (FLONASE ALLERGY RELIEF) 50 mcg/actuation nasal spray 2 Sprays by Both Nostrils route as needed for Rhinitis. No current facility-administered medications for this visit. Allergies: Allergies   Allergen Reactions    Beta-Blockers (Beta-Adrenergic Blocking Agts) Swelling     Patient was told that she swelled from Beta blockers    Codeine Nausea and Vomiting        Review of Systems:  10 systems reviewed. See scanned sheet in \"Media\" section. See HPI for pertinent positives and negatives. Objective: There were no vitals taken for this visit. General appearance  Alert, cooperative, no distress, appears stated age   [de-identified]  Anicteric   Neck Supple   Back   No CVA tenderness   Lungs   CTAB   Heart  Regular rate and rhythm. No murmur, rub or gallop   Abdomen   Soft. Bowel sounds normal. No palpable masses. Nontender. Extremities No CCE. Pulses 2+ right radial   Skin Skin color, texture, turgor normal.    Lymph nodes No palpable supraclavicular lymphadenopathy.    Neurologic Without overt sensory or motor deficit       Signed By: Ron Mallory MD     February 27, 2022

## 2022-02-27 NOTE — PROGRESS NOTES
Ms. Laban Bloch returns today to discuss the CAT scan and preoperative planning. The CAT scan does demonstrate a 2.6 cm mass in the neck of the pancreas, as compared to how the MRI described. There is no evidence for metastatic disease. We discussed at length the Whipple operation, its risks and benefits, and its alternatives. She understands that there is relatively high morbidity and mortality associated with this operation. She would like to proceed but wants to wait a month to get her affairs in order. She is single and has a brother who is involved. I also explained that she would likely need rehab after her hospitalization and prior to returning home. She now tells me that she has seen Dr. Natalie Urena in the past although Dr. Poe Forward is listed as her cardiologist.  In the interval, we will see if she needs any preoperative testing. There is still no CA 19-9 in the system and we will make sure that that gets done. It has been ordered. On exam, there are no new findings. No palpable masses. No palpable lymphadenopathy. Total time spent with the patient was approximately 25 minutes. Greater than 50% was spent counseling.

## 2022-02-28 ENCOUNTER — TELEPHONE (OUTPATIENT)
Dept: CARDIOLOGY CLINIC | Age: 72
End: 2022-02-28

## 2022-02-28 ENCOUNTER — OFFICE VISIT (OUTPATIENT)
Dept: ONCOLOGY | Age: 72
End: 2022-02-28
Payer: MEDICARE

## 2022-02-28 VITALS
OXYGEN SATURATION: 98 % | TEMPERATURE: 98.1 F | SYSTOLIC BLOOD PRESSURE: 142 MMHG | BODY MASS INDEX: 22.18 KG/M2 | HEIGHT: 66 IN | DIASTOLIC BLOOD PRESSURE: 69 MMHG | WEIGHT: 138 LBS | HEART RATE: 72 BPM

## 2022-02-28 DIAGNOSIS — C25.9 PANCREATIC ADENOCARCINOMA (HCC): Primary | ICD-10-CM

## 2022-02-28 PROCEDURE — 1090F PRES/ABSN URINE INCON ASSESS: CPT | Performed by: INTERNAL MEDICINE

## 2022-02-28 PROCEDURE — 1101F PT FALLS ASSESS-DOCD LE1/YR: CPT | Performed by: INTERNAL MEDICINE

## 2022-02-28 PROCEDURE — 3017F COLORECTAL CA SCREEN DOC REV: CPT | Performed by: INTERNAL MEDICINE

## 2022-02-28 PROCEDURE — G8432 DEP SCR NOT DOC, RNG: HCPCS | Performed by: INTERNAL MEDICINE

## 2022-02-28 PROCEDURE — 99213 OFFICE O/P EST LOW 20 MIN: CPT | Performed by: INTERNAL MEDICINE

## 2022-02-28 PROCEDURE — G8420 CALC BMI NORM PARAMETERS: HCPCS | Performed by: INTERNAL MEDICINE

## 2022-02-28 PROCEDURE — G8536 NO DOC ELDER MAL SCRN: HCPCS | Performed by: INTERNAL MEDICINE

## 2022-02-28 PROCEDURE — G8427 DOCREV CUR MEDS BY ELIG CLIN: HCPCS | Performed by: INTERNAL MEDICINE

## 2022-02-28 PROCEDURE — G8400 PT W/DXA NO RESULTS DOC: HCPCS | Performed by: INTERNAL MEDICINE

## 2022-02-28 NOTE — PROGRESS NOTES
[facility]  at [facility] [address], [address] [facility], [address] [place] [address] Ne, [address] [facility]  [phone]       Oncology Note        Patient: Li Driscoll MRN: [id number]  SSN: [id number]    YOB: 1950  Age: 66 y.o. Sex: female      Subjective: Li Driscoll is a 66 y.o. female who has been sent for evaluation and management of newly recognized pancreatic adenocarcinoma. She was noted to have high AST/ALT in routine labs by her PCP. She underwent a MRI/MRCP. She was noted to have a mass in the pancreatic head. She was seen by Dr. Erika Campuzano. EUS established a diagnosis of pancreatic adenocarcinoma. She denies weight loss, jaundice, abdominal pain. Ms. Jose Knox has decided to proceed with surgery but has not scheduled this. She is unsure who her cardiologist is for cardiac clearance. Review of Systems:    Constitutional: negative  Eyes: negative  Ears, Nose, Mouth, Throat, and Face: negative  Respiratory: negative  Cardiovascular: negative  Gastrointestinal: negative  Genitourinary:negative  Integument/Breast: negative  Hematologic/Lymphatic: negative  Musculoskeletal:negative  Neurological: negative    Review of systems was reviewed and updated as needed on 02/28/22.       Past Medical History:   Diagnosis Date    Arthritis     fingers    CAD (coronary artery disease)     Cancer ([address] 2013    melanoma - left forearm    Cancer ([address] 2022    pancreatic    Diabetes ([address]     Hypercholesterolemia     Hypertension     Thyroid disease     thyroidectomy     Past Surgical History:   Procedure Laterality Date    HX CHOLECYSTECTOMY  1990's    HX CORONARY ARTERY BYPASS GRAFT  06/26/09    x3    HX OTHER SURGICAL  2007    thyroidectomy     HX OTHER SURGICAL  07-    wide reexcision of left forearm intermediate melanoma    HX THYROIDECTOMY  2006    HX TONSILLECTOMY      as a child    [place] CABG, ARTERY-VEIN, THREE  2009    bypass Family History   Problem Relation Age of Onset    Heart Disease Mother     Lung Disease Father         rare form of pneumonia    No Known Problems Brother      Social History     Tobacco Use    Smoking status: Never Smoker    Smokeless tobacco: Never Used   Substance Use Topics    Alcohol use: No      Prior to Admission medications    Medication Sig Start Date End Date Taking? Authorizing Provider   naproxen sodium (ALEVE PO) Take  by mouth as needed. Yes Provider, Historical   SITagliptin (JANUVIA) 100 mg tablet Take 100 mg by mouth every evening. Yes Provider, Historical   aspirin delayed-release 81 mg tablet Take 81 mg by mouth two (2) times a day. Yes Provider, Historical   fluticasone (FLONASE ALLERGY RELIEF) 50 mcg/actuation nasal spray 2 Sprays by Both Nostrils route as needed for Rhinitis. Yes Provider, Historical   Cetirizine (ZYRTEC) 10 mg cap Take  by mouth daily. Yes Provider, Historical   amLODIPine (NORVASC) 10 mg tablet Take  by mouth daily. Yes Provider, Historical   metoprolol (LOPRESSOR) 25 mg tablet Take 50 mg by mouth two (2) times a day. Yes Provider, Historical   lovastatin (MEVACOR) 40 mg tablet Take 40 mg by mouth nightly. Yes Provider, Historical   metFORMIN (GLUCOPHAGE) 500 mg tablet Take 1,000 mg by mouth two (2) times daily (with meals). Yes Provider, Historical   levothyroxine (SYNTHROID) 137 mcg tablet Take  by mouth Daily (before breakfast). Yes Provider, Historical   acetaminophen (TYLENOL) 325 mg tablet Take 2 Tabs by mouth every four (4) hours as needed.   Patient not taking: Reported on 1/12/2022 10/12/18   Norberto De La Cruz NP            Allergies   Allergen Reactions    Beta-Blockers (Beta-Adrenergic Blocking Agts) Swelling     Patient was told that she swelled from Beta blockers    Codeine Nausea and Vomiting           Objective:     Vitals:    02/28/22 1136   BP: (!) 142/69   Pulse: 72   Temp: 98.1 °F (36.7 °C)   TempSrc: Temporal   SpO2: 98% Weight: 138 lb (62.6 kg)   Height: 5' 6\" (1.676 m)            Physical Exam:    GENERAL: alert, cooperative, no distress, appears stated age  EYE: conjunctivae/corneas clear. LYMPHATIC: Cervical, supraclavicular, and axillary nodes normal.   THROAT & NECK: normal and no erythema or exudates noted. LUNG: clear to auscultation bilaterally  HEART: regular rate and rhythm  ABDOMEN: soft, non-tender. Bowel sounds normal. No masses,  no organomegaly  EXTREMITIES:  extremities normal, atraumatic, no cyanosis or edema  SKIN: Normal.  NEUROLOGIC: AOx3. Gait normal.    The above physical exam was reviewed and updated as needed on 02/28/22. MRI Results (most recent):  Results from [place] encounter on 11/05/21    MRI ABD W WO CONT    Narrative  Procedure: MRI of the abdomen with MRCP    DATE: 11/5/2021 11:14 AM    TECHNIQUE: Multiplanar MRI of the abdomen was performed with and without  contrast including T2-weighted fat-sat and nonfat sat images, axial in phase and  out of phase imaging, and multiphase postcontrast imaging as well as 3-D MRCP. Contrast: 13 mL of Dotarem    COMPARISON: Ultrasound performed on 10/14/2021    INDICATION: Pancreatic mass. Pancreatitis    FINDINGS:    Liver: No focal liver lesions are identified. Hepatic steatosis    Gallbladder: Surgically absent    Biliary tree: Common bile duct is dilated with intrahepatic biliary dilatation. The common bile duct measures 15 mm. The cystic duct remnant is dilated. The  proximal pancreatic duct is normal in caliber. The distal pancreatic duct is  markedly dilated. Question small amount of sludge in the distal duct. Spleen: Unremarkable    Pancreas: Mass in the body the pancreas measuring 17 x 17 mm with distal  pancreatic atrophy and ductal dilatation as well as side branch ectasia.  The  lesion is relatively hypointense on T2-weighted images    Adrenals: Unremarkable    Kidneys: Small T2 hyperintense lesion in the left kidney too small to  characterize nonenhancing    Vascular: Unremarkable    Soft tissues and osseous structures: Unremarkable    Impression  1. Pancreatic mass in the body the pancreas with distal cardiac atrophy and  ductal dilatation. This may be accessible by EUS through the stomach    2.  Status post cholecystectomy. Dilated common bile duct with intrahepatic  biliary dilatation and cystic duct remnant dilatation as well. Questionable  small amount of sludge or filling defect in the distal common duct. Recommend  correlation with serum alkaline phosphatase. This is somewhat greater than  expected for physiologic ductal dilatation. [email or web address]    [id number]          Assessment:     1. Pancreatic adenocarcinoma   T1 N0 M0 (Stage I)    ECOG PS 1  Intent of therapy - curative  Prognosis - fair     The patient may be a candidate surgical resection. She is seeing Dr. Kathleen Fernandez. Adjuvant chemotherapy has a Category 1 recommendation in the NCCN guidelines. I told her basics of adjuvant treatment. She would be best served by a combination of [place]. I would meet with her after surgery to discuss in more details about the pros and cons of adjuvant treatment. Plan:     > Surgery per Dr. Kathleen Fernandez  > He would like for her to have a cardiac evaluation - says she has not seen anyone since Dr. Loyd Abel. Call placed to Dr. Familia Rubio to see if he can do a cardiac evaluation prior to surgery. > Return in 6 weeks      Signed By: Jessy Portillo NP              I personally saw and evaluated the patient and performed the key components of medical decision making. The history, physical exam, and documentation were performed by Thao Mitchell NP. I reviewed and verified the above documentation and modified it as needed. Signed by: Dimas Aviles MD                     February 28, 2022      CC. Betzy Root MD  CC. Jesus King MD  CC.  Tony Thomason Romario Jimenez MD

## 2022-02-28 NOTE — TELEPHONE ENCOUNTER
[facility]- can you please set up for new patient visit with me Thursday 3/3/22 for pre-op consultation?

## 2022-02-28 NOTE — LETTER
2/28/2022    Patient: Mitch Hogan   YOB: 1950   Date of Visit: 2/28/2022     Stephanie Kowalski MD  [address] [place] [address].. [address] [id number]  Via Fax: [phone]    Dear Stephanie Kowalski MD,      Thank you for referring Ms. Chiara Dumont to [address] [place] [address] for evaluation. My notes for this consultation are attached. If you have questions, please do not hesitate to call me. I look forward to following your patient along with you.       Sincerely,    Josh Hull MD

## 2022-03-01 ENCOUNTER — TELEPHONE (OUTPATIENT)
Dept: ONCOLOGY | Age: 72
End: 2022-03-01

## 2022-03-03 ENCOUNTER — OFFICE VISIT (OUTPATIENT)
Dept: CARDIOLOGY CLINIC | Age: 72
End: 2022-03-03
Payer: MEDICARE

## 2022-03-03 VITALS
OXYGEN SATURATION: 98 % | RESPIRATION RATE: 16 BRPM | HEART RATE: 70 BPM | WEIGHT: 137.5 LBS | BODY MASS INDEX: 22.1 KG/M2 | HEIGHT: 66 IN | SYSTOLIC BLOOD PRESSURE: 160 MMHG | DIASTOLIC BLOOD PRESSURE: 80 MMHG

## 2022-03-03 DIAGNOSIS — I10 BENIGN ESSENTIAL HTN: ICD-10-CM

## 2022-03-03 DIAGNOSIS — R06.09 DOE (DYSPNEA ON EXERTION): ICD-10-CM

## 2022-03-03 DIAGNOSIS — Z01.818 PREOPERATIVE CLEARANCE: ICD-10-CM

## 2022-03-03 DIAGNOSIS — I25.10 ASHD (ARTERIOSCLEROTIC HEART DISEASE): Primary | ICD-10-CM

## 2022-03-03 DIAGNOSIS — E78.2 MIXED HYPERLIPIDEMIA: ICD-10-CM

## 2022-03-03 DIAGNOSIS — C25.9 PANCREATIC ADENOCARCINOMA (HCC): ICD-10-CM

## 2022-03-03 DIAGNOSIS — Z95.1 S/P CABG X 3: ICD-10-CM

## 2022-03-03 PROCEDURE — G8753 SYS BP > OR = 140: HCPCS | Performed by: INTERNAL MEDICINE

## 2022-03-03 PROCEDURE — G8754 DIAS BP LESS 90: HCPCS | Performed by: INTERNAL MEDICINE

## 2022-03-03 PROCEDURE — 1101F PT FALLS ASSESS-DOCD LE1/YR: CPT | Performed by: INTERNAL MEDICINE

## 2022-03-03 PROCEDURE — 93000 ELECTROCARDIOGRAM COMPLETE: CPT | Performed by: INTERNAL MEDICINE

## 2022-03-03 PROCEDURE — G8427 DOCREV CUR MEDS BY ELIG CLIN: HCPCS | Performed by: INTERNAL MEDICINE

## 2022-03-03 PROCEDURE — 99204 OFFICE O/P NEW MOD 45 MIN: CPT | Performed by: INTERNAL MEDICINE

## 2022-03-03 PROCEDURE — G8536 NO DOC ELDER MAL SCRN: HCPCS | Performed by: INTERNAL MEDICINE

## 2022-03-03 PROCEDURE — 3017F COLORECTAL CA SCREEN DOC REV: CPT | Performed by: INTERNAL MEDICINE

## 2022-03-03 PROCEDURE — G8400 PT W/DXA NO RESULTS DOC: HCPCS | Performed by: INTERNAL MEDICINE

## 2022-03-03 PROCEDURE — G8420 CALC BMI NORM PARAMETERS: HCPCS | Performed by: INTERNAL MEDICINE

## 2022-03-03 PROCEDURE — G8432 DEP SCR NOT DOC, RNG: HCPCS | Performed by: INTERNAL MEDICINE

## 2022-03-03 PROCEDURE — 1090F PRES/ABSN URINE INCON ASSESS: CPT | Performed by: INTERNAL MEDICINE

## 2022-03-03 NOTE — LETTER
3/3/2022    Patient: Abilio Lyman   YOB: 1950   Date of Visit: 3/3/2022     Jeanette Beauchamp MD  [address] [address] [address]  Via Fax: [phone]    Dear Jeanette Beauchamp MD,      Thank you for referring Ms. Valdez Munguia to [facility] for evaluation. My notes for this consultation are attached. If you have questions, please do not hesitate to call me. I look forward to following your patient along with you.       Sincerely,    Tali Haas MD

## 2022-03-03 NOTE — PROGRESS NOTES
Chief Complaint   Patient presents with    Referral / Consult     Last seen by Dr Guillermo Ramirez 2013     Patient requesting surgical clearance for pancreatic carcinoma.

## 2022-03-03 NOTE — PROGRESS NOTES
[address] [facility] [address] [facility]  [phone]     Subjective: Brianna Dobson is a 66 y.o. female is here to re-establish care. Pmhx ASHD / CABG x 3, HTN, HLD and DM. Prev seen by Dr Jhoana Rodriguez, last OV in 2013. Recently diagnosed with pancreatic ca, followed by Dr Kathie Everett. Today, she is seeking cardiac clearance for pending Whipple  Surgery to be performed by Dr Sven Barba, date pending. She does endorse some franz and fatigue with heavy exertion, not with  Light activities such as walking. No cp. Home BP 140s/70s, running high this am as she has not taken any medications. The patient denies chest pain, orthopnea, PND, LE edema, palpitations, syncope, or presyncope.        Patient Active Problem List    Diagnosis Date Noted    Carotid stenosis, asymptomatic 10/11/2018    Melanoma of forearm ([facility] 05/06/2013    Basal cell carcinoma, arm 04/21/2013      Maia Bhakta MD  Past Medical History:   Diagnosis Date    Arthritis     fingers    CAD (coronary artery disease)     Cancer ([address] 2013    melanoma - left forearm    Cancer ([address] 2022    pancreatic    Carotid artery disease ([address]     Diabetes ([address]     Hypercholesterolemia     Hypertension     Long term current use of anticoagulant therapy     ASA    Thyroid disease     thyroidectomy      Past Surgical History:   Procedure Laterality Date    HX CHOLECYSTECTOMY  1990's    HX CORONARY ARTERY BYPASS GRAFT  06/26/09    x3    HX HEART CATHETERIZATION      HX OTHER SURGICAL  2007    thyroidectomy     HX OTHER SURGICAL  07-    wide reexcision of left forearm intermediate melanoma    HX THYROIDECTOMY  2006    HX TONSILLECTOMY      as a child    [place] CABG, ARTERY-VEIN, THREE  2009    bypass     Allergies   Allergen Reactions    Beta-Blockers (Beta-Adrenergic Blocking Agts) Swelling     Patient was told that she swelled from Beta blockers    Codeine Nausea and Vomiting      Family History Problem Relation Age of Onset    Heart Disease Mother     Lung Disease Father         rare form of pneumonia    No Known Problems Brother       Social History     Socioeconomic History    Marital status: SINGLE     Spouse name: Not on file    Number of children: Not on file    Years of education: Not on file    Highest education level: Not on file   Occupational History    Not on file   Tobacco Use    Smoking status: Never Smoker    Smokeless tobacco: Never Used   Vaping Use    Vaping Use: Never used   Substance and Sexual Activity    Alcohol use: No    Drug use: No    Sexual activity: Not Currently   Other Topics Concern    Not on file   Social History Narrative    Not on file     Social Determinants of Health     Financial Resource Strain:     Difficulty of Paying Living Expenses: Not on file   Food Insecurity:     Worried About [address] in the Last Year: Not on file    Magaly of Food in the Last Year: Not on file   Transportation Needs:     Lack of Transportation (Medical): Not on file    Lack of Transportation (Non-Medical):  Not on file   Physical Activity:     Days of Exercise per Week: Not on file    Minutes of Exercise per Session: Not on file   Stress:     Feeling of Stress : Not on file   Social Connections:     Frequency of Communication with Friends and Family: Not on file    Frequency of Social Gatherings with Friends and Family: Not on file    Attends [employer] Services: Not on file    Active Member of Clubs or Organizations: Not on file    Attends Club or Organization Meetings: Not on file    Marital Status: Not on file   Intimate Partner Violence:     Fear of Current or Ex-Partner: Not on file    Emotionally Abused: Not on file    Physically Abused: Not on file    Sexually Abused: Not on file   Housing Stability:     Unable to Pay for Housing in the Last Year: Not on file    Number of [place] in the Last Year: Not on file    Unstable Housing in the Last Year: Not on file      Current Outpatient Medications   Medication Sig    naproxen sodium (ALEVE PO) Take  by mouth as needed.  SITagliptin (JANUVIA) 100 mg tablet Take 100 mg by mouth every evening.  aspirin delayed-release 81 mg tablet Take 81 mg by mouth two (2) times a day.  fluticasone (FLONASE ALLERGY RELIEF) 50 mcg/actuation nasal spray 2 Sprays by Both Nostrils route as needed for Rhinitis.  Cetirizine (ZYRTEC) 10 mg cap Take  by mouth daily.  amLODIPine (NORVASC) 10 mg tablet Take  by mouth daily.  metoprolol (LOPRESSOR) 25 mg tablet Take 50 mg by mouth two (2) times a day.  lovastatin (MEVACOR) 40 mg tablet Take 40 mg by mouth nightly.  metFORMIN (GLUCOPHAGE) 500 mg tablet Take 1,000 mg by mouth two (2) times daily (with meals).  levothyroxine (SYNTHROID) 137 mcg tablet Take  by mouth Daily (before breakfast). No current facility-administered medications for this visit. Review of Symptoms:  11 systems reviewed, negative other than as stated in the HPI    Physical ExamPhysical Exam:    Vitals:    03/03/22 0937 03/03/22 0958   BP: (!) 160/80 (!) 160/80   Pulse: 70    Resp: 16    SpO2: 98%    Weight: 137 lb 8 oz (62.4 kg)    Height: 5' 6\" (1.676 m)      Body mass index is 22.19 kg/m². General PE  Gen:  NAD  Mental Status - Alert. General Appearance - Not in acute distress. HEENT:  PERRL, no carotid bruits or JVD  Chest and Lung Exam   Inspection: Accessory muscles - No use of accessory muscles in breathing. Auscultation:   Breath sounds: - Normal.   Cardiovascular   Inspection: Jugular vein - Bilateral - Inspection Normal.   Palpation/Percussion:   Apical Impulse: - Normal.   Auscultation: Rhythm - Regular. Heart Sounds - S1 WNL and S2 WNL. No S3 or S4. Murmurs & Other Heart Sounds: Auscultation of the heart reveals - No Murmurs. Peripheral Vascular   Upper Extremity: Inspection - Bilateral - No Cyanotic nailbeds or Digital clubbing.    Lower Extremity: Palpation: Edema - Bilateral - No edema. Abdomen:   Soft, non-tender, bowel sounds are active. Neuro: A&O times 3, CN and motor grossly WNL    Labs:   No results found for: CHOL, CHOLX, CHLST, CHOLV, 061396, HDL, HDLP, LDL, LDLC, DLDLP, TGLX, Hermelindo Noriega, CHHD, [facility]  Lab Results   Component Value Date/Time     06/22/2009 05:25 PM     06/22/2009 05:25 PM     Lab Results   Component Value Date/Time    Sodium 142 10/12/2018 02:51 AM    Potassium 3.8 10/12/2018 02:51 AM    Chloride 111 (H) 10/12/2018 02:51 AM    CO2 26 10/12/2018 02:51 AM    Anion gap 5 10/12/2018 02:51 AM    Glucose 136 (H) 10/12/2018 02:51 AM    BUN 9 10/12/2018 02:51 AM    Creatinine 0.61 10/12/2018 02:51 AM    BUN/Creatinine ratio 15 10/12/2018 02:51 AM    GFR est AA >60 10/12/2018 02:51 AM    GFR est non-AA >60 10/12/2018 02:51 AM    Calcium 8.2 (L) 10/12/2018 02:51 AM    Bilirubin, total 0.3 10/05/2018 11:45 AM    Alk. phosphatase 76 10/05/2018 11:45 AM    Protein, total 7.1 10/05/2018 11:45 AM    Albumin 4.0 10/05/2018 11:45 AM    Globulin 3.1 10/05/2018 11:45 AM    A-G Ratio 1.3 10/05/2018 11:45 AM    ALT (SGPT) 28 10/05/2018 11:45 AM       EKG:  NSR, poor R wave progression     Assessment:     Assessment:        ICD-10-CM ICD-9-CM    1. ASHD (arteriosclerotic heart disease)  I25.10 414.00 AMB POC EKG ROUTINE W/ 12 LEADS, INTER & REP      NUCLEAR CARDIAC STRESS TEST      ECHO ADULT COMPLETE   2. S/P CABG x 3  Z95.1 V45.81 NUCLEAR CARDIAC STRESS TEST      ECHO ADULT COMPLETE   3. Mixed hyperlipidemia  E78.2 272.2 NUCLEAR CARDIAC STRESS TEST      ECHO ADULT COMPLETE   4. Preoperative clearance  Z01.818 V72.84 NUCLEAR CARDIAC STRESS TEST      ECHO ADULT COMPLETE   5. Benign essential HTN  I10 401.1 NUCLEAR CARDIAC STRESS TEST      ECHO ADULT COMPLETE   6. Pancreatic adenocarcinoma (HCC)  C25.9 157.9 NUCLEAR CARDIAC STRESS TEST      ECHO ADULT COMPLETE   7.  BRITO (dyspnea on exertion)  R06.00 786.09 NUCLEAR CARDIAC STRESS TEST ECHO ADULT COMPLETE       Orders Placed This Encounter    AMB POC EKG ROUTINE W/ 12 LEADS, INTER & REP     Order Specific Question:   Reason for Exam:     Answer:   routine        Plan:     ASHD (arteriosclerotic heart disease)  Hx  S/P CABG x 3  In 2009  Negative NST in 2013  On ASA, BB, CCB and statin    Benign essential HTN  Home /70s,  Has not taken any meds this am  Stable kidney fxn 2/2022    Mixed hyperlipidemia  On statin Labs and lipids per PCP    DM  On oral agents    Pancreatic adenocarcinoma ([facility] [address]  Followed by Dr Brice Bello    S/p Right carotid endarterectomy with Dacron patch  In 10/18 performed by Dr Mariam Kendrick    S/p CABG x 3 in 2009 left internal mammary to the left anterior descending, saphenous vein graft from the aorta to the circumflex marginal, saphenous vein graft from the  aorta to the diagonal.    Preoperative clearance for pending Whipple surgery to be performed by Dr Renny Parra  Will obtain Stephanie/echo,  CC pending until testing complete        Continue current care and f/u in 6 months. Damian Juarez NP    Patient seen and examined by me with the above nurse practitioner. I personally performed all components of the history, physical, and medical decision making and agree with the assessment and plan with minor modifications as noted. Today the patient presents with mild fatigue with exertion, history of CABG, and need for Whipple procedure for pancreatic CA. General PE  Gen:  NAD  Mental Status - Alert. General Appearance - Not in acute distress. HEENT:  PERRL, no carotid bruits or JVD  Chest and Lung Exam   Inspection: Accessory muscles - No use of accessory muscles in breathing. Auscultation:   Breath sounds: - Normal.   Cardiovascular   Inspection: Jugular vein - Bilateral - Inspection Normal.   Palpation/Percussion:   Apical Impulse: - Normal.   Auscultation: Rhythm - Regular. Heart Sounds - S1 WNL and S2 WNL. No S3 or S4.    Murmurs & Other Heart Sounds: Auscultation of the heart reveals - No Murmurs. Peripheral Vascular   Upper Extremity: Inspection - Bilateral - No Cyanotic nailbeds or Digital clubbing. Lower Extremity:   Palpation: Edema - Bilateral - No edema. Abdomen:   Soft, non-tender, bowel sounds are active. Neuro: A&O times 3, CN and motor grossly WNL    Check Lexiscan and echocardiogram.  If no high risk findings, will be okay to proceed with Whipple procedure. Follow up in 6 months if testing is okay.

## 2022-03-04 ENCOUNTER — TELEPHONE (OUTPATIENT)
Dept: SURGERY | Age: 72
End: 2022-03-04

## 2022-03-04 NOTE — TELEPHONE ENCOUNTER
Dr. Kush Izaguirre is supposed to do surgery on her and Dr. Sierra Koyanagi office told her that she need to schedule surgery, please call.

## 2022-03-19 PROBLEM — I65.29 CAROTID STENOSIS, ASYMPTOMATIC: Status: ACTIVE | Noted: 2018-10-11

## 2022-04-07 ENCOUNTER — ANCILLARY PROCEDURE (OUTPATIENT)
Dept: CARDIOLOGY CLINIC | Age: 72
End: 2022-04-07
Payer: MEDICARE

## 2022-04-07 VITALS
HEIGHT: 66 IN | BODY MASS INDEX: 22.18 KG/M2 | SYSTOLIC BLOOD PRESSURE: 144 MMHG | DIASTOLIC BLOOD PRESSURE: 70 MMHG | WEIGHT: 138 LBS

## 2022-04-07 DIAGNOSIS — Z01.818 PREOPERATIVE CLEARANCE: ICD-10-CM

## 2022-04-07 DIAGNOSIS — I10 BENIGN ESSENTIAL HTN: ICD-10-CM

## 2022-04-07 DIAGNOSIS — Z95.1 S/P CABG X 3: ICD-10-CM

## 2022-04-07 DIAGNOSIS — R06.09 DOE (DYSPNEA ON EXERTION): ICD-10-CM

## 2022-04-07 DIAGNOSIS — E78.2 MIXED HYPERLIPIDEMIA: ICD-10-CM

## 2022-04-07 DIAGNOSIS — C25.9 PANCREATIC ADENOCARCINOMA (HCC): ICD-10-CM

## 2022-04-07 DIAGNOSIS — I25.10 ASHD (ARTERIOSCLEROTIC HEART DISEASE): ICD-10-CM

## 2022-04-07 LAB
NUC STRESS EJECTION FRACTION: 81 %
STRESS BASELINE DIAS BP: 70 MMHG
STRESS BASELINE HR: 70 BPM
STRESS BASELINE ST DEPRESSION: 0 MM
STRESS BASELINE SYS BP: 144 MMHG
STRESS PEAK DIAS BP: 64 MMHG
STRESS PEAK SYS BP: 158 MMHG
STRESS PERCENT HR ACHIEVED: 71 %
STRESS POST PEAK HR: 105 BPM
STRESS RATE PRESSURE PRODUCT: NORMAL BPM*MMHG
STRESS ST DEPRESSION: 0 MM
STRESS TARGET HR: 148 BPM

## 2022-04-07 PROCEDURE — 93015 CV STRESS TEST SUPVJ I&R: CPT | Performed by: INTERNAL MEDICINE

## 2022-04-07 PROCEDURE — A9502 TC99M TETROFOSMIN: HCPCS | Performed by: INTERNAL MEDICINE

## 2022-04-07 PROCEDURE — 78452 HT MUSCLE IMAGE SPECT MULT: CPT | Performed by: INTERNAL MEDICINE

## 2022-04-08 ENCOUNTER — TELEPHONE (OUTPATIENT)
Dept: CARDIOLOGY CLINIC | Age: 72
End: 2022-04-08

## 2022-04-08 NOTE — PROGRESS NOTES
Please call the patient and inform that stress test is normal; low concern for significant blockages in the heart arteries    May proceed with Whipple surgery to be performed by Dr René Stoner.     May hold ASA 5-7 days prior to surgery, resume when deemed safe from surgical standpoint

## 2022-04-08 NOTE — TELEPHONE ENCOUNTER
----- Message from Army Yamila NP sent at 4/8/2022  4:18 PM EDT -----  Please call the patient and inform that stress test is normal; low concern for significant blockages in the heart arteries    May proceed with Whipple surgery to be performed by Dr Lida Che.     May hold ASA 5-7 days prior to surgery, resume when deemed safe from surgical standpoint

## 2022-04-19 ENCOUNTER — ANCILLARY PROCEDURE (OUTPATIENT)
Dept: CARDIOLOGY CLINIC | Age: 72
End: 2022-04-19
Payer: MEDICARE

## 2022-04-19 VITALS
WEIGHT: 138 LBS | HEIGHT: 66 IN | DIASTOLIC BLOOD PRESSURE: 72 MMHG | SYSTOLIC BLOOD PRESSURE: 144 MMHG | BODY MASS INDEX: 22.18 KG/M2

## 2022-04-19 DIAGNOSIS — R06.09 DOE (DYSPNEA ON EXERTION): ICD-10-CM

## 2022-04-19 DIAGNOSIS — I10 BENIGN ESSENTIAL HTN: ICD-10-CM

## 2022-04-19 DIAGNOSIS — I25.10 ASHD (ARTERIOSCLEROTIC HEART DISEASE): ICD-10-CM

## 2022-04-19 DIAGNOSIS — Z95.1 S/P CABG X 3: ICD-10-CM

## 2022-04-19 DIAGNOSIS — Z01.818 PREOPERATIVE CLEARANCE: ICD-10-CM

## 2022-04-19 DIAGNOSIS — C25.9 PANCREATIC ADENOCARCINOMA (HCC): ICD-10-CM

## 2022-04-19 DIAGNOSIS — E78.2 MIXED HYPERLIPIDEMIA: ICD-10-CM

## 2022-04-19 LAB
ECHO AO ASC DIAM: 3.1 CM
ECHO AO ASCENDING AORTA INDEX: 1.81 CM/M2
ECHO AO ROOT DIAM: 2.8 CM
ECHO AO ROOT INDEX: 1.64 CM/M2
ECHO AV AREA PEAK VELOCITY: 2.6 CM2
ECHO AV AREA/BSA PEAK VELOCITY: 1.5 CM2/M2
ECHO AV PEAK GRADIENT: 7 MMHG
ECHO AV PEAK VELOCITY: 1.3 M/S
ECHO LA DIAMETER INDEX: 1.93 CM/M2
ECHO LA DIAMETER: 3.3 CM
ECHO LA TO AORTIC ROOT RATIO: 1.18
ECHO LA VOL 2C: 44 ML (ref 22–52)
ECHO LA VOL 4C: 45 ML (ref 22–52)
ECHO LA VOL BP: 46 ML (ref 22–52)
ECHO LA VOL/BSA BIPLANE: 27 ML/M2 (ref 16–34)
ECHO LA VOLUME AREA LENGTH: 48 ML
ECHO LA VOLUME INDEX A2C: 26 ML/M2 (ref 16–34)
ECHO LA VOLUME INDEX A4C: 26 ML/M2 (ref 16–34)
ECHO LA VOLUME INDEX AREA LENGTH: 28 ML/M2 (ref 16–34)
ECHO LV E' LATERAL VELOCITY: 7 CM/S
ECHO LV E' SEPTAL VELOCITY: 5 CM/S
ECHO LV FRACTIONAL SHORTENING: 25 % (ref 28–44)
ECHO LV INTERNAL DIMENSION DIASTOLE INDEX: 2.81 CM/M2
ECHO LV INTERNAL DIMENSION DIASTOLIC: 4.8 CM (ref 3.9–5.3)
ECHO LV INTERNAL DIMENSION SYSTOLIC INDEX: 2.11 CM/M2
ECHO LV INTERNAL DIMENSION SYSTOLIC: 3.6 CM
ECHO LV IVSD: 0.9 CM (ref 0.6–0.9)
ECHO LV MASS 2D: 147.8 G (ref 67–162)
ECHO LV MASS INDEX 2D: 86.4 G/M2 (ref 43–95)
ECHO LV POSTERIOR WALL DIASTOLIC: 0.9 CM (ref 0.6–0.9)
ECHO LV RELATIVE WALL THICKNESS RATIO: 0.38
ECHO LVOT AREA: 2.8 CM2
ECHO LVOT DIAM: 1.9 CM
ECHO LVOT MEAN GRADIENT: 3 MMHG
ECHO LVOT PEAK GRADIENT: 4 MMHG
ECHO LVOT PEAK GRADIENT: 5 MMHG
ECHO LVOT PEAK VELOCITY: 1.1 M/S
ECHO LVOT PEAK VELOCITY: 1.1 M/S
ECHO LVOT STROKE VOLUME INDEX: 40.9 ML/M2
ECHO LVOT SV: 70 ML
ECHO LVOT VTI: 24.7 CM
ECHO MV A VELOCITY: 1.1 M/S
ECHO MV E DECELERATION TIME (DT): 295.3 MS
ECHO MV E VELOCITY: 0.7 M/S
ECHO MV E/A RATIO: 0.64
ECHO MV E/E' LATERAL: 10
ECHO MV E/E' RATIO (AVERAGED): 12
ECHO MV E/E' SEPTAL: 14
ECHO PULMONARY ARTERY END DIASTOLIC PRESSURE: 7 MMHG
ECHO PV REGURGITANT MAX VELOCITY: 1.3 M/S
ECHO RV TAPSE: 1.2 CM (ref 1.7–?)

## 2022-04-19 PROCEDURE — 93306 TTE W/DOPPLER COMPLETE: CPT | Performed by: INTERNAL MEDICINE

## 2022-04-22 ENCOUNTER — TELEPHONE (OUTPATIENT)
Dept: CARDIOLOGY CLINIC | Age: 72
End: 2022-04-22

## 2022-04-22 NOTE — TELEPHONE ENCOUNTER
Patient informed of recent echocardiogram informed her clearance note was already faxed to Dr Walton Been

## 2022-04-22 NOTE — TELEPHONE ENCOUNTER
----- Message from Army Yamila NP sent at 4/21/2022  4:44 PM EDT -----  Echo looks good---normal pumping heart strength, valves look good and healthy

## 2022-04-25 ENCOUNTER — OFFICE VISIT (OUTPATIENT)
Dept: SURGERY | Age: 72
End: 2022-04-25
Payer: MEDICARE

## 2022-04-25 VITALS
HEART RATE: 76 BPM | BODY MASS INDEX: 22.14 KG/M2 | RESPIRATION RATE: 16 BRPM | HEIGHT: 66 IN | DIASTOLIC BLOOD PRESSURE: 66 MMHG | OXYGEN SATURATION: 99 % | SYSTOLIC BLOOD PRESSURE: 130 MMHG | TEMPERATURE: 97.7 F | WEIGHT: 137.8 LBS

## 2022-04-25 DIAGNOSIS — C25.9 PANCREATIC ADENOCARCINOMA (HCC): Primary | ICD-10-CM

## 2022-04-25 PROCEDURE — G8752 SYS BP LESS 140: HCPCS | Performed by: SURGERY

## 2022-04-25 PROCEDURE — G8400 PT W/DXA NO RESULTS DOC: HCPCS | Performed by: SURGERY

## 2022-04-25 PROCEDURE — 1101F PT FALLS ASSESS-DOCD LE1/YR: CPT | Performed by: SURGERY

## 2022-04-25 PROCEDURE — G8420 CALC BMI NORM PARAMETERS: HCPCS | Performed by: SURGERY

## 2022-04-25 PROCEDURE — 99213 OFFICE O/P EST LOW 20 MIN: CPT | Performed by: SURGERY

## 2022-04-25 PROCEDURE — G8427 DOCREV CUR MEDS BY ELIG CLIN: HCPCS | Performed by: SURGERY

## 2022-04-25 PROCEDURE — G8432 DEP SCR NOT DOC, RNG: HCPCS | Performed by: SURGERY

## 2022-04-25 PROCEDURE — 1090F PRES/ABSN URINE INCON ASSESS: CPT | Performed by: SURGERY

## 2022-04-25 PROCEDURE — G8754 DIAS BP LESS 90: HCPCS | Performed by: SURGERY

## 2022-04-25 PROCEDURE — G8536 NO DOC ELDER MAL SCRN: HCPCS | Performed by: SURGERY

## 2022-04-25 PROCEDURE — 3017F COLORECTAL CA SCREEN DOC REV: CPT | Performed by: SURGERY

## 2022-04-25 NOTE — PROGRESS NOTES
Identified pt with two pt identifiers(name and ). Reviewed record in preparation for visit and have obtained necessary documentation. All patient medications has been reviewed. Chief Complaint   Patient presents with    Follow-up     [facility] Due   Topic    Hepatitis C Screening     Lipid Screen     DTaP/Tdap/Td series (1 - Tdap)    Colorectal Cancer Screening Combo     Shingrix Vaccine Age 50> (1 of 2)    Breast Cancer Screen Mammogram     Bone Densitometry (Dexa) Screening     Pneumococcal 65+ years (1 - PCV)    Medicare Yearly Exam     COVID-19 Vaccine (2 - Pfizer 3-dose series)       Vitals:    22 1037   BP: 130/66   Pulse: 76   Resp: 16   Temp: 97.7 °F (36.5 °C)   TempSrc: Temporal   SpO2: 99%   Weight: 62.5 kg (137 lb 12.8 oz)   Height: 5' 6\" (1.676 m)   PainSc:   0 - No pain       4. Have you been to the ER, urgent care clinic since your last visit? Hospitalized since your last visit? No    5. Have you seen or consulted any other health care providers outside of the [address] [place] [address] since your last visit? Include any pap smears or colon screening. No      Patient is accompanied by self I have received verbal consent from Clotilde Melgar to discuss any/all medical information while they are present in the room.

## 2022-04-25 NOTE — PROGRESS NOTES
Ms. Chet Roberts has been seen by cardiology and underwent a stress test.  The stress test appeared normal and there is low concern for cardiac complications related to surgery. She has not had any pain. She will have occasional indigestion. No back pain. She says she is not as hungry as she usually is but her weight has been stable at 137 pounds. She is 137.8 pounds today. She continues to remain active. She walks frequently and was picking up sticks in the yard today. On exam, her chest is clear to auscultation bilaterally. Regular rate and rhythm. Abdomen is soft and nontender. Adenocarcinoma involving the neck of the pancreas. We will send her for pancreas protocol CT ASAP. She wants to wait until next Monday to do this. Assuming we see no progression of disease, we will get her scheduled for Whipple ASAP. We again discussed the significant risks with this operation. She understands the risk of recurrence as well as the risk of perioperative complications. She would like to proceed. We will plan for a stent at [facility] after her discharge from here. She lives with her elderly brother. Total time spent 25 minutes, the majority in counseling.

## 2022-04-25 NOTE — Clinical Note
4/25/2022    Patient: Andrea Guzman   YOB: 1950   Date of Visit: 4/25/2022     Pawel Patel MD  [address] [address] [address]  Via Fax: [phone]    Dear Pawel Patel MD,      Thank you [facility]  [facility]. My notes for this consultation are attached. If you have questions, please do not hesitate to call me. I look forward to following your patient along with you.       Sincerely,    Pedro Marie MD

## 2022-05-03 DIAGNOSIS — C25.9 MALIGNANT NEOPLASM OF PANCREAS, UNSPECIFIED LOCATION OF MALIGNANCY (HCC): Primary | ICD-10-CM

## 2022-05-05 ENCOUNTER — HOSPITAL ENCOUNTER (OUTPATIENT)
Dept: CT IMAGING | Age: 72
Discharge: HOME | End: 2022-05-05
Attending: SURGERY
Payer: MEDICARE

## 2022-05-05 DIAGNOSIS — C25.9 MALIGNANT NEOPLASM OF PANCREAS, UNSPECIFIED LOCATION OF MALIGNANCY (HCC): ICD-10-CM

## 2022-05-05 LAB — CREAT BLD-MCNC: 0.8 MG/DL (ref 0.6–1.3)

## 2022-05-05 PROCEDURE — 74011000636 HC RX REV CODE- 636: Performed by: SURGERY

## 2022-05-05 PROCEDURE — 74178 CT ABD&PLV WO CNTR FLWD CNTR: CPT

## 2022-05-05 PROCEDURE — 82565 ASSAY OF CREATININE: CPT

## 2022-05-05 RX ADMIN — IOPAMIDOL 100 ML: 755 INJECTION, SOLUTION INTRAVENOUS at 07:54

## 2022-05-16 ENCOUNTER — OFFICE VISIT (OUTPATIENT)
Dept: SURGERY | Age: 72
End: 2022-05-16
Payer: MEDICARE

## 2022-05-16 VITALS
HEART RATE: 65 BPM | HEIGHT: 66 IN | TEMPERATURE: 97.5 F | RESPIRATION RATE: 16 BRPM | SYSTOLIC BLOOD PRESSURE: 139 MMHG | BODY MASS INDEX: 22.02 KG/M2 | DIASTOLIC BLOOD PRESSURE: 73 MMHG | WEIGHT: 137 LBS | OXYGEN SATURATION: 94 %

## 2022-05-16 DIAGNOSIS — C25.9 PANCREATIC ADENOCARCINOMA (HCC): Primary | ICD-10-CM

## 2022-05-16 PROCEDURE — 1101F PT FALLS ASSESS-DOCD LE1/YR: CPT | Performed by: SURGERY

## 2022-05-16 PROCEDURE — G8420 CALC BMI NORM PARAMETERS: HCPCS | Performed by: SURGERY

## 2022-05-16 PROCEDURE — 1090F PRES/ABSN URINE INCON ASSESS: CPT | Performed by: SURGERY

## 2022-05-16 PROCEDURE — G8427 DOCREV CUR MEDS BY ELIG CLIN: HCPCS | Performed by: SURGERY

## 2022-05-16 PROCEDURE — G8752 SYS BP LESS 140: HCPCS | Performed by: SURGERY

## 2022-05-16 PROCEDURE — G8432 DEP SCR NOT DOC, RNG: HCPCS | Performed by: SURGERY

## 2022-05-16 PROCEDURE — G8400 PT W/DXA NO RESULTS DOC: HCPCS | Performed by: SURGERY

## 2022-05-16 PROCEDURE — 1123F ACP DISCUSS/DSCN MKR DOCD: CPT | Performed by: SURGERY

## 2022-05-16 PROCEDURE — G8754 DIAS BP LESS 90: HCPCS | Performed by: SURGERY

## 2022-05-16 PROCEDURE — G8536 NO DOC ELDER MAL SCRN: HCPCS | Performed by: SURGERY

## 2022-05-16 PROCEDURE — 99212 OFFICE O/P EST SF 10 MIN: CPT | Performed by: SURGERY

## 2022-05-16 PROCEDURE — 3017F COLORECTAL CA SCREEN DOC REV: CPT | Performed by: SURGERY

## 2022-05-16 NOTE — PROGRESS NOTES
Identified pt with two pt identifiers(name and ). Reviewed record in preparation for visit and have obtained necessary documentation. All patient medications has been reviewed. Chief Complaint   Patient presents with    Follow-up     discuss surgery        Health Maintenance Due   Topic    Hepatitis C Screening     Lipid Screen     DTaP/Tdap/Td series (1 - Tdap)    Colorectal Cancer Screening Combo     Shingrix Vaccine Age 50> (1 of 2)    Breast Cancer Screen Mammogram     Bone Densitometry (Dexa) Screening     Pneumococcal 65+ years (1 - PCV)    Medicare Yearly Exam     COVID-19 Vaccine (2 - Pfizer 3-dose series)       Vitals:    22 1137   Pulse: 65   Resp: 16   Temp: 97.5 °F (36.4 °C)   SpO2: 94%   Weight: 62.1 kg (137 lb)   Height: 5' 6\" (1.676 m)   PainSc:   0 - No pain       4. Have you been to the ER, urgent care clinic since your last visit? Hospitalized since your last visit? No    5. Have you seen or consulted any other health care providers outside of the [address] [place] [address] since your last visit? Include any pap smears or colon screening. No      Patient is accompanied by self I have received verbal consent from Bladimir Kirkpatrick to discuss any/all medical information while they are present in the room.

## 2022-06-02 ENCOUNTER — HOSPITAL ENCOUNTER (OUTPATIENT)
Dept: PREADMISSION TESTING | Age: 72
Discharge: HOME | End: 2022-06-02
Attending: SURGERY
Payer: MEDICARE

## 2022-06-02 VITALS
OXYGEN SATURATION: 98 % | BODY MASS INDEX: 23.24 KG/M2 | DIASTOLIC BLOOD PRESSURE: 60 MMHG | RESPIRATION RATE: 18 BRPM | WEIGHT: 144.62 LBS | SYSTOLIC BLOOD PRESSURE: 152 MMHG | HEIGHT: 66 IN | HEART RATE: 74 BPM | TEMPERATURE: 98.2 F

## 2022-06-02 LAB
ALBUMIN SERPL-MCNC: 3.8 G/DL (ref 3.5–5)
ALBUMIN/GLOB SERPL: 1.2 {RATIO} (ref 1.1–2.2)
ALP SERPL-CCNC: 99 U/L (ref 45–117)
ALT SERPL-CCNC: 55 U/L (ref 12–78)
ANION GAP SERPL CALC-SCNC: 5 MMOL/L (ref 5–15)
APTT PPP: 25.1 SEC (ref 22.1–31)
AST SERPL-CCNC: 26 U/L (ref 15–37)
BILIRUB SERPL-MCNC: 0.7 MG/DL (ref 0.2–1)
BUN SERPL-MCNC: 13 MG/DL (ref 6–20)
BUN/CREAT SERPL: 15 (ref 12–20)
CALCIUM SERPL-MCNC: 9.6 MG/DL (ref 8.5–10.1)
CHLORIDE SERPL-SCNC: 105 MMOL/L (ref 97–108)
CO2 SERPL-SCNC: 28 MMOL/L (ref 21–32)
CREAT SERPL-MCNC: 0.87 MG/DL (ref 0.55–1.02)
ERYTHROCYTE [DISTWIDTH] IN BLOOD BY AUTOMATED COUNT: 12.4 % (ref 11.5–14.5)
GLOBULIN SER CALC-MCNC: 3.2 G/DL (ref 2–4)
GLUCOSE SERPL-MCNC: 182 MG/DL (ref 65–100)
HCT VFR BLD AUTO: 40.4 % (ref 35–47)
HGB BLD-MCNC: 13.5 G/DL (ref 11.5–16)
HISTORY CHECKED?,CKHIST: NORMAL
INR PPP: 1 (ref 0.9–1.1)
MCH RBC QN AUTO: 30.1 PG (ref 26–34)
MCHC RBC AUTO-ENTMCNC: 33.4 G/DL (ref 30–36.5)
MCV RBC AUTO: 90 FL (ref 80–99)
NRBC # BLD: 0 K/UL (ref 0–0.01)
NRBC BLD-RTO: 0 PER 100 WBC
PLATELET # BLD AUTO: 268 K/UL (ref 150–400)
PMV BLD AUTO: 9.5 FL (ref 8.9–12.9)
POTASSIUM SERPL-SCNC: 3.7 MMOL/L (ref 3.5–5.1)
PROT SERPL-MCNC: 7 G/DL (ref 6.4–8.2)
PROTHROMBIN TIME: 10.5 SEC (ref 9–11.1)
RBC # BLD AUTO: 4.49 M/UL (ref 3.8–5.2)
SODIUM SERPL-SCNC: 138 MMOL/L (ref 136–145)
THERAPEUTIC RANGE,PTTT: NORMAL SECS (ref 58–77)
WBC # BLD AUTO: 8.8 K/UL (ref 3.6–11)

## 2022-06-02 PROCEDURE — 86923 COMPATIBILITY TEST ELECTRIC: CPT

## 2022-06-02 PROCEDURE — 86301 IMMUNOASSAY TUMOR CA 19-9: CPT

## 2022-06-02 PROCEDURE — 80053 COMPREHEN METABOLIC PANEL: CPT

## 2022-06-02 PROCEDURE — 85730 THROMBOPLASTIN TIME PARTIAL: CPT

## 2022-06-02 PROCEDURE — 85027 COMPLETE CBC AUTOMATED: CPT

## 2022-06-02 PROCEDURE — 36415 COLL VENOUS BLD VENIPUNCTURE: CPT

## 2022-06-02 PROCEDURE — 86901 BLOOD TYPING SEROLOGIC RH(D): CPT

## 2022-06-02 PROCEDURE — 85610 PROTHROMBIN TIME: CPT

## 2022-06-02 RX ORDER — IBUPROFEN 200 MG
200 TABLET ORAL
COMMUNITY

## 2022-06-02 RX ORDER — SODIUM CHLORIDE, SODIUM LACTATE, POTASSIUM CHLORIDE, CALCIUM CHLORIDE 600; 310; 30; 20 MG/100ML; MG/100ML; MG/100ML; MG/100ML
25 INJECTION, SOLUTION INTRAVENOUS CONTINUOUS
Status: CANCELLED | OUTPATIENT
Start: 2022-06-08

## 2022-06-02 RX ORDER — SODIUM CHLORIDE 9 MG/ML
25 INJECTION, SOLUTION INTRAVENOUS AS NEEDED
Status: DISCONTINUED | OUTPATIENT
Start: 2022-06-08 | End: 2022-06-06 | Stop reason: HOSPADM

## 2022-06-02 NOTE — PERIOP NOTES
[facility]  Preoperative Instructions        Surgery Date 6/15/22         Time of Arrival to be called @[phone]      1. On the day of your surgery, please report to the Surgical Services Registration Desk and sign in at your designated time. The Surgery Center is located to the right of the Emergency Room. 2. You must have someone with you to drive you home. You should not drive a car for 24 hours following surgery. Please make arrangements for a friend or family member to stay with you for the first 24 hours after your surgery. 3. Do not have anything to eat or drink (including water, gum, mints, coffee, juice) after midnight. ?This may not apply to medications prescribed by your physician. ?(Please note below the special instructions with medications to take the morning of your procedure.)    4. We recommend you do not drink any alcoholic beverages for 24 hours before and after your surgery. 5. Contact your surgeons office for instructions on the following medications: non-steroidal anti-inflammatory drugs (i.e. Advil, Aleve), vitamins, and supplements. (Some surgeons will want you to stop these medications prior to surgery and others may allow you to take them)  **If you are currently taking Plavix, Coumadin, Aspirin and/or other blood-thinning agents, contact your surgeon for instructions. ** Your surgeon will partner with the physician prescribing these medications to determine if it is safe to stop or if you need to continue taking. Please do not stop taking these medications without instructions from your surgeon    6. Wear comfortable clothes. Wear glasses instead of contacts. Do not bring any money or jewelry. Please bring picture ID, insurance card, and any prearranged co-payment or hospital payment. Do not wear make-up, particularly mascara the morning of your surgery. Do not wear nail polish, particularly if you are having foot /hand surgery.   Wear your hair loose or down, no ponytails, buns, ulysses pins or clips. All body piercings must be removed. Please shower with antibacterial soap for three consecutive days before and on the morning of surgery, but do not apply any lotions, powders or deodorants after the shower on the day of surgery. Please use a fresh towels after each shower. Please sleep in clean clothes and change bed linens the night before surgery. Please do not shave for 48 hours prior to surgery. Shaving of the face is acceptable. 7. You should understand that if you do not follow these instructions your surgery may be cancelled. If your physical condition changes (I.e. fever, cold or flu) please contact your surgeon as soon as possible. 8. It is important that you be on time. If a situation occurs where you may be late, please call [phone] (OR Holding Area). 9. If you have any questions and or problems, please call [phone] (Pre-admission Testing). 10. Your surgery time may be subject to change. You will receive a phone call the evening prior if your time changes. 11.  If having outpatient surgery, you must have someone to drive you here, stay with you during the duration of your stay, and to drive you home at time of discharge. Special Instructions: one week prior to surgery stop vitamins, supplements, aspirin, advil, aleve, ibuprofen per Dr. Thea Gillis : no diabetic meds  (Amlodipine and metoprolol ok to take)      I understand a pre-operative phone call will be made to verify my surgery time. In the event that I am not available, I give permission for a message to be left on my answering service and/or with another person?   yes         ___________________      __________   _________    (Signature of Patient)             (Witness)                (Date and Time)

## 2022-06-03 LAB — CANCER AG19-9 SERPL-ACNC: 226 U/ML (ref 0–35)

## 2022-06-14 ENCOUNTER — ANESTHESIA EVENT (OUTPATIENT)
Dept: SURGERY | Age: 72
DRG: 406 | End: 2022-06-14
Payer: MEDICARE

## 2022-06-15 ENCOUNTER — ANESTHESIA (OUTPATIENT)
Dept: SURGERY | Age: 72
DRG: 406 | End: 2022-06-15
Payer: MEDICARE

## 2022-06-15 ENCOUNTER — HOSPITAL ENCOUNTER (INPATIENT)
Age: 72
LOS: 8 days | Discharge: HOME | DRG: 406 | End: 2022-06-23
Attending: SURGERY | Admitting: SURGERY
Payer: MEDICARE

## 2022-06-15 DIAGNOSIS — Z90.410 POST-PANCREATECTOMY DIABETES (HCC): Primary | ICD-10-CM

## 2022-06-15 DIAGNOSIS — C25.9 PANCREAS CARCINOMA (HCC): ICD-10-CM

## 2022-06-15 DIAGNOSIS — E11.65 TYPE 2 DIABETES MELLITUS WITH HYPERGLYCEMIA, WITHOUT LONG-TERM CURRENT USE OF INSULIN (HCC): ICD-10-CM

## 2022-06-15 DIAGNOSIS — E13.9 POST-PANCREATECTOMY DIABETES (HCC): Primary | ICD-10-CM

## 2022-06-15 DIAGNOSIS — E89.1 POST-PANCREATECTOMY DIABETES (HCC): Primary | ICD-10-CM

## 2022-06-15 LAB
GLUCOSE BLD STRIP.AUTO-MCNC: 263 MG/DL (ref 65–117)
GLUCOSE BLD STRIP.AUTO-MCNC: 274 MG/DL (ref 65–117)
GLUCOSE BLD STRIP.AUTO-MCNC: 292 MG/DL (ref 65–117)
GLUCOSE BLD STRIP.AUTO-MCNC: 301 MG/DL (ref 65–117)
GLUCOSE BLD STRIP.AUTO-MCNC: 306 MG/DL (ref 65–117)
GLUCOSE BLD STRIP.AUTO-MCNC: 308 MG/DL (ref 65–117)
GLUCOSE BLD STRIP.AUTO-MCNC: 319 MG/DL (ref 65–117)
SERVICE CMNT-IMP: ABNORMAL

## 2022-06-15 PROCEDURE — 77030008771 HC TU NG SALEM SUMP -A: Performed by: ANESTHESIOLOGY

## 2022-06-15 PROCEDURE — 88331 PATH CONSLTJ SURG 1 BLK 1SPC: CPT

## 2022-06-15 PROCEDURE — 77030026438 HC STYL ET INTUB CARD -A: Performed by: ANESTHESIOLOGY

## 2022-06-15 PROCEDURE — 74011000250 HC RX REV CODE- 250: Performed by: SURGERY

## 2022-06-15 PROCEDURE — 76060000041 HC ANESTHESIA 5 TO 5.5 HR: Performed by: SURGERY

## 2022-06-15 PROCEDURE — 74011636637 HC RX REV CODE- 636/637: Performed by: ANESTHESIOLOGY

## 2022-06-15 PROCEDURE — 2709999900 HC NON-CHARGEABLE SUPPLY: Performed by: SURGERY

## 2022-06-15 PROCEDURE — 77030010939 HC CLP LIG TELE -B: Performed by: SURGERY

## 2022-06-15 PROCEDURE — 74011250636 HC RX REV CODE- 250/636: Performed by: NURSE ANESTHETIST, CERTIFIED REGISTERED

## 2022-06-15 PROCEDURE — 74011250636 HC RX REV CODE- 250/636: Performed by: SURGERY

## 2022-06-15 PROCEDURE — 77030011808 HC STPLR ENDOSCOPIC J&J -D: Performed by: SURGERY

## 2022-06-15 PROCEDURE — 77010033678 HC OXYGEN DAILY

## 2022-06-15 PROCEDURE — 82962 GLUCOSE BLOOD TEST: CPT

## 2022-06-15 PROCEDURE — 77030035236 HC SUT PDS STRATFX BARB J&J -B: Performed by: SURGERY

## 2022-06-15 PROCEDURE — 88309 TISSUE EXAM BY PATHOLOGIST: CPT

## 2022-06-15 PROCEDURE — 88307 TISSUE EXAM BY PATHOLOGIST: CPT

## 2022-06-15 PROCEDURE — 74011000250 HC RX REV CODE- 250: Performed by: ANESTHESIOLOGY

## 2022-06-15 PROCEDURE — 77030002916 HC SUT ETHLN J&J -A: Performed by: SURGERY

## 2022-06-15 PROCEDURE — 77030019908 HC STETH ESOPH SIMS -A: Performed by: ANESTHESIOLOGY

## 2022-06-15 PROCEDURE — 74011000258 HC RX REV CODE- 258: Performed by: SURGERY

## 2022-06-15 PROCEDURE — 07TP0ZZ RESECTION OF SPLEEN, OPEN APPROACH: ICD-10-PCS | Performed by: SURGERY

## 2022-06-15 PROCEDURE — 88305 TISSUE EXAM BY PATHOLOGIST: CPT

## 2022-06-15 PROCEDURE — P9045 ALBUMIN (HUMAN), 5%, 250 ML: HCPCS | Performed by: NURSE ANESTHETIST, CERTIFIED REGISTERED

## 2022-06-15 PROCEDURE — 74011636637 HC RX REV CODE- 636/637: Performed by: SURGERY

## 2022-06-15 PROCEDURE — 2709999900 HC NON-CHARGEABLE SUPPLY

## 2022-06-15 PROCEDURE — 0FBG0ZZ EXCISION OF PANCREAS, OPEN APPROACH: ICD-10-PCS | Performed by: SURGERY

## 2022-06-15 PROCEDURE — 77030013079 HC BLNKT BAIR HGGR 3M -A: Performed by: ANESTHESIOLOGY

## 2022-06-15 PROCEDURE — 74011250637 HC RX REV CODE- 250/637: Performed by: SURGERY

## 2022-06-15 PROCEDURE — 74011250636 HC RX REV CODE- 250/636: Performed by: ANESTHESIOLOGY

## 2022-06-15 PROCEDURE — 77030009967 HC RELD STPLR ENDOSC J&J -C: Performed by: SURGERY

## 2022-06-15 PROCEDURE — 77030013532 HC SEAL FBRN TISSL RDYTUSE 4ML BAXT -C: Performed by: SURGERY

## 2022-06-15 PROCEDURE — 76210000017 HC OR PH I REC 1.5 TO 2 HR: Performed by: SURGERY

## 2022-06-15 PROCEDURE — 77030002996 HC SUT SLK J&J -A: Performed by: SURGERY

## 2022-06-15 PROCEDURE — 74011000250 HC RX REV CODE- 250: Performed by: NURSE ANESTHETIST, CERTIFIED REGISTERED

## 2022-06-15 PROCEDURE — 48140 PARTIAL REMOVAL OF PANCREAS: CPT | Performed by: SURGERY

## 2022-06-15 PROCEDURE — 77030003029 HC SUT VCRL J&J -B: Performed by: SURGERY

## 2022-06-15 PROCEDURE — 76010000177 HC OR TIME 5 TO 5.5 HR INTENSV-TIER 1: Performed by: SURGERY

## 2022-06-15 PROCEDURE — 65270000046 HC RM TELEMETRY

## 2022-06-15 PROCEDURE — 77030002986 HC SUT PROL J&J -A: Performed by: SURGERY

## 2022-06-15 PROCEDURE — 77030018809 HC RETRCTR ALXSO DISP AMR -B: Performed by: SURGERY

## 2022-06-15 PROCEDURE — 77030011264 HC ELECTRD BLD EXT COVD -A: Performed by: SURGERY

## 2022-06-15 PROCEDURE — 77030012407 HC DRN WND BARD -B: Performed by: SURGERY

## 2022-06-15 PROCEDURE — 77030013567 HC DRN WND RESERV BARD -A: Performed by: SURGERY

## 2022-06-15 PROCEDURE — 77030008684 HC TU ET CUF COVD -B: Performed by: ANESTHESIOLOGY

## 2022-06-15 PROCEDURE — 77030008462 HC STPLR SKN PROX J&J -A: Performed by: SURGERY

## 2022-06-15 DEVICE — CLIP LIG L TI MTL LIG SYS 24 COUNT HORZ: Type: IMPLANTABLE DEVICE | Status: FUNCTIONAL

## 2022-06-15 RX ORDER — ONDANSETRON 2 MG/ML
4 INJECTION INTRAMUSCULAR; INTRAVENOUS AS NEEDED
Status: DISCONTINUED | OUTPATIENT
Start: 2022-06-15 | End: 2022-06-17

## 2022-06-15 RX ORDER — FENTANYL CITRATE 50 UG/ML
25 INJECTION, SOLUTION INTRAMUSCULAR; INTRAVENOUS
Status: DISCONTINUED | OUTPATIENT
Start: 2022-06-15 | End: 2022-06-17

## 2022-06-15 RX ORDER — DEXTROSE MONOHYDRATE 100 MG/ML
0-250 INJECTION, SOLUTION INTRAVENOUS AS NEEDED
Status: DISCONTINUED | OUTPATIENT
Start: 2022-06-15 | End: 2022-06-23 | Stop reason: HOSPADM

## 2022-06-15 RX ORDER — AMLODIPINE BESYLATE 5 MG/1
10 TABLET ORAL DAILY
Status: DISCONTINUED | OUTPATIENT
Start: 2022-06-16 | End: 2022-06-23 | Stop reason: HOSPADM

## 2022-06-15 RX ORDER — SODIUM CHLORIDE, SODIUM LACTATE, POTASSIUM CHLORIDE, CALCIUM CHLORIDE 600; 310; 30; 20 MG/100ML; MG/100ML; MG/100ML; MG/100ML
25 INJECTION, SOLUTION INTRAVENOUS CONTINUOUS
Status: DISCONTINUED | OUTPATIENT
Start: 2022-06-15 | End: 2022-06-15 | Stop reason: HOSPADM

## 2022-06-15 RX ORDER — MORPHINE SULFATE 2 MG/ML
2 INJECTION, SOLUTION INTRAMUSCULAR; INTRAVENOUS
Status: DISCONTINUED | OUTPATIENT
Start: 2022-06-15 | End: 2022-06-17

## 2022-06-15 RX ORDER — EPHEDRINE SULFATE/0.9% NACL/PF 50 MG/5 ML
SYRINGE (ML) INTRAVENOUS AS NEEDED
Status: DISCONTINUED | OUTPATIENT
Start: 2022-06-15 | End: 2022-06-15

## 2022-06-15 RX ORDER — HYDROMORPHONE HYDROCHLORIDE 1 MG/ML
INJECTION, SOLUTION INTRAMUSCULAR; INTRAVENOUS; SUBCUTANEOUS AS NEEDED
Status: DISCONTINUED | OUTPATIENT
Start: 2022-06-15 | End: 2022-06-15 | Stop reason: HOSPADM

## 2022-06-15 RX ORDER — HYDRALAZINE HYDROCHLORIDE 20 MG/ML
INJECTION INTRAMUSCULAR; INTRAVENOUS AS NEEDED
Status: DISCONTINUED | OUTPATIENT
Start: 2022-06-15 | End: 2022-06-15 | Stop reason: HOSPADM

## 2022-06-15 RX ORDER — MAGNESIUM SULFATE 100 %
4 CRYSTALS MISCELLANEOUS AS NEEDED
Status: DISCONTINUED | OUTPATIENT
Start: 2022-06-15 | End: 2022-06-23 | Stop reason: HOSPADM

## 2022-06-15 RX ORDER — NALOXONE HYDROCHLORIDE 0.4 MG/ML
0.4 INJECTION, SOLUTION INTRAMUSCULAR; INTRAVENOUS; SUBCUTANEOUS AS NEEDED
Status: DISCONTINUED | OUTPATIENT
Start: 2022-06-15 | End: 2022-06-23 | Stop reason: HOSPADM

## 2022-06-15 RX ORDER — SODIUM CHLORIDE 9 MG/ML
125 INJECTION, SOLUTION INTRAVENOUS CONTINUOUS
Status: DISCONTINUED | OUTPATIENT
Start: 2022-06-15 | End: 2022-06-17

## 2022-06-15 RX ORDER — ACETAMINOPHEN 325 MG/1
650 TABLET ORAL EVERY 6 HOURS
Status: DISCONTINUED | OUTPATIENT
Start: 2022-06-15 | End: 2022-06-21

## 2022-06-15 RX ORDER — SODIUM CHLORIDE 0.9 % (FLUSH) 0.9 %
5-40 SYRINGE (ML) INJECTION EVERY 8 HOURS
Status: DISCONTINUED | OUTPATIENT
Start: 2022-06-15 | End: 2022-06-15 | Stop reason: HOSPADM

## 2022-06-15 RX ORDER — HYDROMORPHONE HYDROCHLORIDE 1 MG/ML
.2-.5 INJECTION, SOLUTION INTRAMUSCULAR; INTRAVENOUS; SUBCUTANEOUS
Status: DISCONTINUED | OUTPATIENT
Start: 2022-06-15 | End: 2022-06-17

## 2022-06-15 RX ORDER — ENOXAPARIN SODIUM 100 MG/ML
40 INJECTION SUBCUTANEOUS EVERY 24 HOURS
Status: DISCONTINUED | OUTPATIENT
Start: 2022-06-16 | End: 2022-06-23 | Stop reason: HOSPADM

## 2022-06-15 RX ORDER — ROCURONIUM BROMIDE 10 MG/ML
INJECTION, SOLUTION INTRAVENOUS AS NEEDED
Status: DISCONTINUED | OUTPATIENT
Start: 2022-06-15 | End: 2022-06-15 | Stop reason: HOSPADM

## 2022-06-15 RX ORDER — HYDRALAZINE HYDROCHLORIDE 20 MG/ML
10 INJECTION INTRAMUSCULAR; INTRAVENOUS
Status: DISCONTINUED | OUTPATIENT
Start: 2022-06-15 | End: 2022-06-23 | Stop reason: HOSPADM

## 2022-06-15 RX ORDER — GLYCOPYRROLATE 0.2 MG/ML
INJECTION INTRAMUSCULAR; INTRAVENOUS AS NEEDED
Status: DISCONTINUED | OUTPATIENT
Start: 2022-06-15 | End: 2022-06-15 | Stop reason: HOSPADM

## 2022-06-15 RX ORDER — POLYETHYLENE GLYCOL 3350 17 G/17G
17 POWDER, FOR SOLUTION ORAL DAILY
Status: DISCONTINUED | OUTPATIENT
Start: 2022-06-16 | End: 2022-06-23 | Stop reason: HOSPADM

## 2022-06-15 RX ORDER — ONDANSETRON 2 MG/ML
INJECTION INTRAMUSCULAR; INTRAVENOUS AS NEEDED
Status: DISCONTINUED | OUTPATIENT
Start: 2022-06-15 | End: 2022-06-15 | Stop reason: HOSPADM

## 2022-06-15 RX ORDER — SODIUM CHLORIDE 0.9 % (FLUSH) 0.9 %
5-40 SYRINGE (ML) INJECTION AS NEEDED
Status: DISCONTINUED | OUTPATIENT
Start: 2022-06-15 | End: 2022-06-17

## 2022-06-15 RX ORDER — FENTANYL CITRATE 50 UG/ML
INJECTION, SOLUTION INTRAMUSCULAR; INTRAVENOUS AS NEEDED
Status: DISCONTINUED | OUTPATIENT
Start: 2022-06-15 | End: 2022-06-15 | Stop reason: HOSPADM

## 2022-06-15 RX ORDER — SODIUM CHLORIDE 0.9 % (FLUSH) 0.9 %
5-40 SYRINGE (ML) INJECTION EVERY 8 HOURS
Status: DISCONTINUED | OUTPATIENT
Start: 2022-06-15 | End: 2022-06-23 | Stop reason: HOSPADM

## 2022-06-15 RX ORDER — BUPIVACAINE HYDROCHLORIDE 5 MG/ML
INJECTION, SOLUTION EPIDURAL; INTRACAUDAL AS NEEDED
Status: DISCONTINUED | OUTPATIENT
Start: 2022-06-15 | End: 2022-06-15 | Stop reason: HOSPADM

## 2022-06-15 RX ORDER — SODIUM CHLORIDE, SODIUM LACTATE, POTASSIUM CHLORIDE, CALCIUM CHLORIDE 600; 310; 30; 20 MG/100ML; MG/100ML; MG/100ML; MG/100ML
INJECTION, SOLUTION INTRAVENOUS
Status: DISCONTINUED | OUTPATIENT
Start: 2022-06-15 | End: 2022-06-15 | Stop reason: HOSPADM

## 2022-06-15 RX ORDER — SODIUM CHLORIDE 0.9 % (FLUSH) 0.9 %
5-40 SYRINGE (ML) INJECTION EVERY 8 HOURS
Status: DISCONTINUED | OUTPATIENT
Start: 2022-06-15 | End: 2022-06-16

## 2022-06-15 RX ORDER — FLUTICASONE PROPIONATE 50 MCG
2 SPRAY, SUSPENSION (ML) NASAL
Status: DISCONTINUED | OUTPATIENT
Start: 2022-06-15 | End: 2022-06-23 | Stop reason: HOSPADM

## 2022-06-15 RX ORDER — ONDANSETRON 2 MG/ML
4 INJECTION INTRAMUSCULAR; INTRAVENOUS
Status: DISCONTINUED | OUTPATIENT
Start: 2022-06-15 | End: 2022-06-23 | Stop reason: HOSPADM

## 2022-06-15 RX ORDER — SODIUM CHLORIDE 0.9 % (FLUSH) 0.9 %
5-40 SYRINGE (ML) INJECTION AS NEEDED
Status: DISCONTINUED | OUTPATIENT
Start: 2022-06-15 | End: 2022-06-15 | Stop reason: HOSPADM

## 2022-06-15 RX ORDER — INSULIN LISPRO 100 [IU]/ML
INJECTION, SOLUTION INTRAVENOUS; SUBCUTANEOUS EVERY 6 HOURS
Status: DISCONTINUED | OUTPATIENT
Start: 2022-06-15 | End: 2022-06-20

## 2022-06-15 RX ORDER — NEOSTIGMINE METHYLSULFATE 1 MG/ML
INJECTION, SOLUTION INTRAVENOUS AS NEEDED
Status: DISCONTINUED | OUTPATIENT
Start: 2022-06-15 | End: 2022-06-15 | Stop reason: HOSPADM

## 2022-06-15 RX ORDER — EPHEDRINE SULFATE/0.9% NACL/PF 50 MG/5 ML
SYRINGE (ML) INTRAVENOUS AS NEEDED
Status: DISCONTINUED | OUTPATIENT
Start: 2022-06-15 | End: 2022-06-15 | Stop reason: HOSPADM

## 2022-06-15 RX ORDER — LIDOCAINE HYDROCHLORIDE 10 MG/ML
0.1 INJECTION, SOLUTION EPIDURAL; INFILTRATION; INTRACAUDAL; PERINEURAL AS NEEDED
Status: DISCONTINUED | OUTPATIENT
Start: 2022-06-15 | End: 2022-06-15 | Stop reason: HOSPADM

## 2022-06-15 RX ORDER — LIDOCAINE HYDROCHLORIDE 20 MG/ML
INJECTION, SOLUTION EPIDURAL; INFILTRATION; INTRACAUDAL; PERINEURAL AS NEEDED
Status: DISCONTINUED | OUTPATIENT
Start: 2022-06-15 | End: 2022-06-15 | Stop reason: HOSPADM

## 2022-06-15 RX ORDER — PROPOFOL 10 MG/ML
INJECTION, EMULSION INTRAVENOUS AS NEEDED
Status: DISCONTINUED | OUTPATIENT
Start: 2022-06-15 | End: 2022-06-15 | Stop reason: HOSPADM

## 2022-06-15 RX ORDER — DIPHENHYDRAMINE HYDROCHLORIDE 50 MG/ML
12.5 INJECTION, SOLUTION INTRAMUSCULAR; INTRAVENOUS AS NEEDED
Status: ACTIVE | OUTPATIENT
Start: 2022-06-15 | End: 2022-06-15

## 2022-06-15 RX ORDER — SODIUM CHLORIDE 0.9 % (FLUSH) 0.9 %
5-40 SYRINGE (ML) INJECTION AS NEEDED
Status: DISCONTINUED | OUTPATIENT
Start: 2022-06-15 | End: 2022-06-16

## 2022-06-15 RX ORDER — ALBUMIN HUMAN 50 G/1000ML
SOLUTION INTRAVENOUS AS NEEDED
Status: DISCONTINUED | OUTPATIENT
Start: 2022-06-15 | End: 2022-06-15 | Stop reason: HOSPADM

## 2022-06-15 RX ORDER — KETOROLAC TROMETHAMINE 30 MG/ML
15 INJECTION, SOLUTION INTRAMUSCULAR; INTRAVENOUS
Status: ACTIVE | OUTPATIENT
Start: 2022-06-15 | End: 2022-06-16

## 2022-06-15 RX ORDER — DEXAMETHASONE SODIUM PHOSPHATE 4 MG/ML
INJECTION, SOLUTION INTRA-ARTICULAR; INTRALESIONAL; INTRAMUSCULAR; INTRAVENOUS; SOFT TISSUE AS NEEDED
Status: DISCONTINUED | OUTPATIENT
Start: 2022-06-15 | End: 2022-06-15 | Stop reason: HOSPADM

## 2022-06-15 RX ORDER — SUCCINYLCHOLINE CHLORIDE 20 MG/ML
INJECTION INTRAMUSCULAR; INTRAVENOUS AS NEEDED
Status: DISCONTINUED | OUTPATIENT
Start: 2022-06-15 | End: 2022-06-15 | Stop reason: HOSPADM

## 2022-06-15 RX ORDER — SODIUM CHLORIDE, SODIUM LACTATE, POTASSIUM CHLORIDE, CALCIUM CHLORIDE 600; 310; 30; 20 MG/100ML; MG/100ML; MG/100ML; MG/100ML
25 INJECTION, SOLUTION INTRAVENOUS CONTINUOUS
Status: DISCONTINUED | OUTPATIENT
Start: 2022-06-15 | End: 2022-06-17 | Stop reason: ALTCHOICE

## 2022-06-15 RX ORDER — MIDAZOLAM HYDROCHLORIDE 1 MG/ML
INJECTION, SOLUTION INTRAMUSCULAR; INTRAVENOUS AS NEEDED
Status: DISCONTINUED | OUTPATIENT
Start: 2022-06-15 | End: 2022-06-15 | Stop reason: HOSPADM

## 2022-06-15 RX ADMIN — MIDAZOLAM HYDROCHLORIDE 0.5 MG: 1 INJECTION, SOLUTION INTRAMUSCULAR; INTRAVENOUS at 07:26

## 2022-06-15 RX ADMIN — Medication 4 UNITS: at 14:42

## 2022-06-15 RX ADMIN — ROCURONIUM BROMIDE 20 MG: 10 INJECTION INTRAVENOUS at 09:53

## 2022-06-15 RX ADMIN — CEFOXITIN 2 G: 2 INJECTION, POWDER, FOR SOLUTION INTRAVENOUS at 09:28

## 2022-06-15 RX ADMIN — SODIUM CHLORIDE, POTASSIUM CHLORIDE, SODIUM LACTATE AND CALCIUM CHLORIDE 25 ML/HR: 600; 310; 30; 20 INJECTION, SOLUTION INTRAVENOUS at 07:14

## 2022-06-15 RX ADMIN — HYDROMORPHONE HYDROCHLORIDE 0.4 MG: 1 INJECTION, SOLUTION INTRAMUSCULAR; INTRAVENOUS; SUBCUTANEOUS at 13:03

## 2022-06-15 RX ADMIN — Medication 3 MG: at 13:51

## 2022-06-15 RX ADMIN — HYDROMORPHONE HYDROCHLORIDE 0.2 MG: 1 INJECTION, SOLUTION INTRAMUSCULAR; INTRAVENOUS; SUBCUTANEOUS at 09:13

## 2022-06-15 RX ADMIN — Medication 8 UNITS: at 17:41

## 2022-06-15 RX ADMIN — FENTANYL CITRATE 25 MCG: 50 INJECTION, SOLUTION INTRAMUSCULAR; INTRAVENOUS at 09:52

## 2022-06-15 RX ADMIN — FENTANYL CITRATE 25 MCG: 50 INJECTION, SOLUTION INTRAMUSCULAR; INTRAVENOUS at 11:11

## 2022-06-15 RX ADMIN — ROCURONIUM BROMIDE 20 MG: 10 INJECTION INTRAVENOUS at 11:23

## 2022-06-15 RX ADMIN — SODIUM CHLORIDE, PRESERVATIVE FREE 10 ML: 5 INJECTION INTRAVENOUS at 16:55

## 2022-06-15 RX ADMIN — SODIUM CHLORIDE, POTASSIUM CHLORIDE, SODIUM LACTATE AND CALCIUM CHLORIDE: 600; 310; 30; 20 INJECTION, SOLUTION INTRAVENOUS at 12:57

## 2022-06-15 RX ADMIN — GLYCOPYRROLATE 0.2 MG: 0.2 INJECTION, SOLUTION INTRAMUSCULAR; INTRAVENOUS at 13:54

## 2022-06-15 RX ADMIN — HYDROMORPHONE HYDROCHLORIDE 0.2 MG: 1 INJECTION, SOLUTION INTRAMUSCULAR; INTRAVENOUS; SUBCUTANEOUS at 09:59

## 2022-06-15 RX ADMIN — SODIUM CHLORIDE, POTASSIUM CHLORIDE, SODIUM LACTATE AND CALCIUM CHLORIDE: 600; 310; 30; 20 INJECTION, SOLUTION INTRAVENOUS at 10:20

## 2022-06-15 RX ADMIN — ALBUMIN (HUMAN) 12.5 G: 2.5 SOLUTION INTRAVENOUS at 10:22

## 2022-06-15 RX ADMIN — SODIUM CHLORIDE, POTASSIUM CHLORIDE, SODIUM LACTATE AND CALCIUM CHLORIDE: 600; 310; 30; 20 INJECTION, SOLUTION INTRAVENOUS at 09:13

## 2022-06-15 RX ADMIN — SODIUM CHLORIDE, PRESERVATIVE FREE 10 ML: 5 INJECTION INTRAVENOUS at 17:43

## 2022-06-15 RX ADMIN — Medication 6 UNITS: at 15:21

## 2022-06-15 RX ADMIN — SODIUM CHLORIDE 125 ML/HR: 9 INJECTION, SOLUTION INTRAVENOUS at 14:59

## 2022-06-15 RX ADMIN — MIDAZOLAM HYDROCHLORIDE 0.5 MG: 1 INJECTION, SOLUTION INTRAMUSCULAR; INTRAVENOUS at 09:13

## 2022-06-15 RX ADMIN — ROCURONIUM BROMIDE 15 MG: 10 INJECTION INTRAVENOUS at 09:43

## 2022-06-15 RX ADMIN — CEFOXITIN 2 G: 2 INJECTION, POWDER, FOR SOLUTION INTRAVENOUS at 13:41

## 2022-06-15 RX ADMIN — ROCURONIUM BROMIDE 20 MG: 10 INJECTION INTRAVENOUS at 13:25

## 2022-06-15 RX ADMIN — HYDRALAZINE HYDROCHLORIDE 5 MG: 20 INJECTION INTRAMUSCULAR; INTRAVENOUS at 14:04

## 2022-06-15 RX ADMIN — ROCURONIUM BROMIDE 20 MG: 10 INJECTION INTRAVENOUS at 10:28

## 2022-06-15 RX ADMIN — HYDROMORPHONE HYDROCHLORIDE 0.2 MG: 1 INJECTION, SOLUTION INTRAMUSCULAR; INTRAVENOUS; SUBCUTANEOUS at 07:27

## 2022-06-15 RX ADMIN — PROPOFOL 10 MG: 10 INJECTION, EMULSION INTRAVENOUS at 10:49

## 2022-06-15 RX ADMIN — CEFOXITIN 2 G: 2 INJECTION, POWDER, FOR SOLUTION INTRAVENOUS at 11:35

## 2022-06-15 RX ADMIN — Medication 3 AMPULE: at 07:15

## 2022-06-15 RX ADMIN — HYDROMORPHONE HYDROCHLORIDE 0.4 MG: 1 INJECTION, SOLUTION INTRAMUSCULAR; INTRAVENOUS; SUBCUTANEOUS at 13:37

## 2022-06-15 RX ADMIN — ROCURONIUM BROMIDE 5 MG: 10 INJECTION INTRAVENOUS at 09:20

## 2022-06-15 RX ADMIN — SUCCINYLCHOLINE CHLORIDE 120 MG: 20 INJECTION, SOLUTION INTRAMUSCULAR; INTRAVENOUS at 09:22

## 2022-06-15 RX ADMIN — GLYCOPYRROLATE 0.4 MG: 0.2 INJECTION, SOLUTION INTRAMUSCULAR; INTRAVENOUS at 13:51

## 2022-06-15 RX ADMIN — ACETAMINOPHEN 325MG 650 MG: 325 TABLET ORAL at 23:10

## 2022-06-15 RX ADMIN — FENTANYL CITRATE 25 MCG: 50 INJECTION, SOLUTION INTRAMUSCULAR; INTRAVENOUS at 12:52

## 2022-06-15 RX ADMIN — PROPOFOL 100 MG: 10 INJECTION, EMULSION INTRAVENOUS at 09:20

## 2022-06-15 RX ADMIN — HYDRALAZINE HYDROCHLORIDE 5 MG: 20 INJECTION INTRAMUSCULAR; INTRAVENOUS at 09:57

## 2022-06-15 RX ADMIN — ACETAMINOPHEN 325MG 650 MG: 325 TABLET ORAL at 17:41

## 2022-06-15 RX ADMIN — LIDOCAINE HYDROCHLORIDE 80 MG: 20 INJECTION, SOLUTION EPIDURAL; INFILTRATION; INTRACAUDAL; PERINEURAL at 09:20

## 2022-06-15 RX ADMIN — ONDANSETRON HYDROCHLORIDE 4 MG: 2 INJECTION, SOLUTION INTRAMUSCULAR; INTRAVENOUS at 13:51

## 2022-06-15 RX ADMIN — PROPOFOL 10 MG: 10 INJECTION, EMULSION INTRAVENOUS at 07:30

## 2022-06-15 RX ADMIN — HYDROMORPHONE HYDROCHLORIDE 0.4 MG: 1 INJECTION, SOLUTION INTRAMUSCULAR; INTRAVENOUS; SUBCUTANEOUS at 10:49

## 2022-06-15 RX ADMIN — DEXAMETHASONE SODIUM PHOSPHATE 4 MG: 4 INJECTION, SOLUTION INTRAMUSCULAR; INTRAVENOUS at 09:28

## 2022-06-15 RX ADMIN — Medication 10 UNITS: at 23:56

## 2022-06-15 RX ADMIN — Medication: at 15:03

## 2022-06-15 RX ADMIN — ROCURONIUM BROMIDE 20 MG: 10 INJECTION INTRAVENOUS at 11:00

## 2022-06-15 RX ADMIN — FENTANYL CITRATE 25 MCG: 50 INJECTION, SOLUTION INTRAMUSCULAR; INTRAVENOUS at 10:30

## 2022-06-15 RX ADMIN — HYDRALAZINE HYDROCHLORIDE 5 MG: 20 INJECTION INTRAMUSCULAR; INTRAVENOUS at 10:59

## 2022-06-15 RX ADMIN — Medication 10 MG: at 10:19

## 2022-06-15 RX ADMIN — ROCURONIUM BROMIDE 20 MG: 10 INJECTION INTRAVENOUS at 12:46

## 2022-06-15 RX ADMIN — SODIUM CHLORIDE 125 ML/HR: 9 INJECTION, SOLUTION INTRAVENOUS at 23:09

## 2022-06-15 RX ADMIN — FENTANYL CITRATE 25 MCG: 50 INJECTION, SOLUTION INTRAMUSCULAR; INTRAVENOUS at 09:20

## 2022-06-15 RX ADMIN — Medication 2 MG: at 13:54

## 2022-06-15 RX ADMIN — HYDROMORPHONE HYDROCHLORIDE 0.2 MG: 1 INJECTION, SOLUTION INTRAMUSCULAR; INTRAVENOUS; SUBCUTANEOUS at 10:33

## 2022-06-15 RX ADMIN — ALBUMIN (HUMAN) 12.5 G: 2.5 SOLUTION INTRAVENOUS at 13:15

## 2022-06-15 NOTE — PERIOP NOTES
TRANSFER - OUT REPORT:    Verbal report given to [facility] RN on Sanders Goldmann  being transferred to [facility](unit) for routine post - op       Report consisted of patients Situation, Background, Assessment and   Recommendations(SBAR). Information from the following report(s) SBAR, OR Summary, Procedure Summary, Intake/Output, MAR, Recent Results and Cardiac Rhythm NSR PVC was reviewed with the receiving nurse. Opportunity for Gita Pinon RN   Registered Nurse   SURGERY   Periop Notes       Addendum   Date of Service:  06/15/22 1527                       []Hide copied text    []Aaron for details    1425     Handoff Report from Operating Room to PACU     Report received from [address] [place] [address]  and [facility] CRNA regarding Sanders Goldmann.       Surgeon(s):  Tamiko Sal MD  And Procedure(s) (LRB):  DIAGNOSTIC LAPAROSCOPY, DISTAL PANCREATECTOMY, SPLENECTOMY (N/A)  confirmed   with allergies, drains and dressings discussed.     Anesthesia type, drugs, patient history, complications, estimated blood loss, vital signs, intake and output, and last pain medication and reversal medications were reviewed.    1442  received order 4u regular insulin IV per Dr. Casey Raza, see mar  [id number] rechecked , rechecked per Dr. Casey Raza for , received order for regular insulin 6units IV see mar. 1520 Dr. Akin Alvarado f/u with patient in pacu   1525 room assignment pending, pts brother Yecenia David received post op update     1553 BS 26, notified Dr. Casey Raza , received order for hospitalist consult.      [phone] hospitalist consult cancelled, Dr. Yvonne Babin to place order diabetes mgt team for post op mgt. [address] [place] [address] Dr. Abraham Keene waiting return call from diabetes mgt team, orders to follow [facility] RN made aware of post op consult          Revision History                                                        estions and clarification was provided.       Patient transported with:   Monitor  O2 @ 3 liters  Registered Nurse

## 2022-06-15 NOTE — BRIEF OP NOTE
[id number]  Brief Postoperative Note    Patient: Aristeo Monae  YOB: 1950  MRN: [id number]    Date of Procedure: 6/15/2022     Pre-Op Diagnosis: PANCREATIC CArcinoma    Post-Op Diagnosis: Same as preoperative diagnosis. Procedure(s):  DIAGNOSTIC LAPAROSCOPY, DISTAL PANCREATECTOMY, SPLENECTOMY    Surgeon(s):  Sundar Sotelo MD    Surgical Assistant: Surg Asst-1: Bartolome Diaz RN    Anesthesia: General     Estimated Blood Loss (mL): 277    Complications: None immediate    Specimens:   ID Type Source Tests Collected by Time Destination   1 : spleen Preservative Spleen  Sundar Sotelo MD 6/15/2022 1144 Pathology   2 : pancreatic margin Frozen Section Pancreas  Sundar Sotelo MD 6/15/2022 1301 Pathology   3 : distal pancreas Preservative Pancreas  Sundar Sotelo MD 6/15/2022 1324 Pathology        Implants: * No implants in log *    Drains:   Donald-Doe Drain 06/15/22 Right Abdomen (Active)       Orogastric Tube 06/15/22 (Active)       Findings: panc neck mass, splenic artery imbedded in it.       Electronically Signed by Uriel Tamez MD on 6/15/2022 at 2:02 PM

## 2022-06-15 NOTE — PERIOP NOTES
Patient without s/s of covid or recent travel. Belongings to PACU 1 bag. Brother in waiting room. mepilex to sacrum, no redness, tenderness or signs of skin breakdown. Patient remains alert and oriented throughout preop stay. resp even unlabored, denies discomfort. Resting quietly.

## 2022-06-15 NOTE — PROGRESS NOTES
Problem: Falls - Risk of  Goal: *Absence of Falls  Description: Document Finesse Joradn Fall Risk and appropriate interventions in the flowsheet.   Outcome: Progressing Towards Goal  Note: Fall Risk Interventions:

## 2022-06-15 NOTE — PERIOP NOTES
Leo from Operating Room to PACU    Report received from [facility]  and [facility] FRIEDA regarding Tha Gillis. Surgeon(s):  Mireille Strickland MD  And Procedure(s) (LRB):  DIAGNOSTIC LAPAROSCOPY, DISTAL PANCREATECTOMY, SPLENECTOMY (N/A)  confirmed   with allergies, drains and dressings discussed. Anesthesia type, drugs, patient history, complications, estimated blood loss, vital signs, intake and output, and last pain medication and reversal medications were reviewed. 1442  received order 4u regular insulin IV per Dr. Joe Hidalgo, see mar  [id number] rechecked , rechecked per Dr. Joe Hidalgo for , received order for regular insulin 6units IV see mar. 1520 Dr. Gretchen Carrasco f/u with patient in pacu   1525 room assignment pending, pts brother Coby Corona received post op update     46 BS 26, notified Dr. Joe Hidalgo , received order for hospitalist consult. [address] hospitalist consult cancelled, Dr. Jerry Trejo to place order diabetes mgt team for post op mgt.   26 Dr. Jessika Her waiting return call from diabetes mgt team, orders to follow [facility] RN made aware of post op consult

## 2022-06-15 NOTE — PROGRESS NOTES
End of Shift Note    Bedside shift change report given to Danny Bolton RN (oncoming nurse) by Sherman Cardona RN (offgoing nurse). Report included the following information SBAR, Magdy, MAR, Bernice and Recent Results    Shift worked:  7AM-7PM     Shift summary and any significant changes:     Dilaudid PCA     Concerns for physician to address:  n/a     Zone phone for oncoming shift:  n/a       Activity:  Activity Level: Bed Rest  Number times ambulated in hallways past shift: 0  Number of times OOB to chair past shift: 0    Cardiac:   Cardiac Monitoring: Yes      Cardiac Rhythm: Sinus Rhythm    Access:   Current line(s): PIV     Genitourinary:   Urinary status: frederick    Respiratory:   O2 Device: Nasal cannula  Chronic home O2 use?: YES  Incentive spirometer at bedside: NO       GI:  Last Bowel Movement Date: 06/14/22  Current diet:  DIET NPO  Passing flatus: YES  Tolerating current diet: YES       Pain Management:   Patient states pain is manageable on current regimen: YES    Skin:  Saw Score: 23  Interventions: float heels, increase time out of bed and PT/OT consult    Patient Safety:  Fall Score:  Total Score: 1  Interventions: bed/chair alarm, gripper socks and pt to call before getting OOB       Length of Stay:  Expected LOS: - - -  Actual LOS: 0      Sherman Cardona RN

## 2022-06-15 NOTE — ANESTHESIA PREPROCEDURE EVALUATION
Relevant Problems   PERSONAL HX & FAMILY HX OF CANCER   (+) Basal cell carcinoma, arm   (+) Melanoma of forearm (HCC)       Anesthetic History   No history of anesthetic complications            Review of Systems / Medical History  Patient summary reviewed, nursing notes reviewed and pertinent labs reviewed    Pulmonary  Within defined limits                 Neuro/Psych   Within defined limits           Cardiovascular    Hypertension: well controlled          CAD and CABG    Exercise tolerance: >4 METS  Comments: Normal echo April 2022   GI/Hepatic/Renal  Within defined limits              Endo/Other    Diabetes: type 2  Hypothyroidism: well controlled  Arthritis and cancer     Other Findings   Comments: Hypercholesterolemia  Diabetes ([facility] [address]  Hypertension  CAD (coronary artery disease)  Thyroid disease  Cancer ([facility] [address]  Arthritis         Physical Exam    Airway  Mallampati: II  TM Distance: 4 - 6 cm  Neck ROM: normal range of motion   Mouth opening: Normal     Cardiovascular  Regular rate and rhythm,  S1 and S2 normal,  no murmur, click, rub, or gallop  Rhythm: regular  Rate: normal         Dental    Dentition: Caps/crowns     Pulmonary  Breath sounds clear to auscultation               Abdominal  GI exam deferred       Other Findings            Anesthetic Plan    ASA: 3  Anesthesia type: general          Induction: Intravenous  Anesthetic plan and risks discussed with: Patient      Possible a line and central line

## 2022-06-16 LAB
ANION GAP SERPL CALC-SCNC: 6 MMOL/L (ref 5–15)
BASOPHILS # BLD: 0 K/UL (ref 0–0.1)
BASOPHILS NFR BLD: 0 % (ref 0–1)
BUN SERPL-MCNC: 8 MG/DL (ref 6–20)
BUN/CREAT SERPL: 11 (ref 12–20)
CALCIUM SERPL-MCNC: 8 MG/DL (ref 8.5–10.1)
CHLORIDE SERPL-SCNC: 112 MMOL/L (ref 97–108)
CO2 SERPL-SCNC: 25 MMOL/L (ref 21–32)
CREAT SERPL-MCNC: 0.76 MG/DL (ref 0.55–1.02)
DIFFERENTIAL METHOD BLD: ABNORMAL
EOSINOPHIL # BLD: 0 K/UL (ref 0–0.4)
EOSINOPHIL NFR BLD: 0 % (ref 0–7)
ERYTHROCYTE [DISTWIDTH] IN BLOOD BY AUTOMATED COUNT: 12.3 % (ref 11.5–14.5)
GLUCOSE BLD STRIP.AUTO-MCNC: 155 MG/DL (ref 65–117)
GLUCOSE BLD STRIP.AUTO-MCNC: 165 MG/DL (ref 65–117)
GLUCOSE BLD STRIP.AUTO-MCNC: 168 MG/DL (ref 65–117)
GLUCOSE BLD STRIP.AUTO-MCNC: 187 MG/DL (ref 65–117)
GLUCOSE SERPL-MCNC: 169 MG/DL (ref 65–100)
HCT VFR BLD AUTO: 34.1 % (ref 35–47)
HGB BLD-MCNC: 11.6 G/DL (ref 11.5–16)
IMM GRANULOCYTES # BLD AUTO: 0.1 K/UL (ref 0–0.04)
IMM GRANULOCYTES NFR BLD AUTO: 1 % (ref 0–0.5)
LYMPHOCYTES # BLD: 1.3 K/UL (ref 0.8–3.5)
LYMPHOCYTES NFR BLD: 6 % (ref 12–49)
MAGNESIUM SERPL-MCNC: 1.6 MG/DL (ref 1.6–2.4)
MCH RBC QN AUTO: 30.5 PG (ref 26–34)
MCHC RBC AUTO-ENTMCNC: 34 G/DL (ref 30–36.5)
MCV RBC AUTO: 89.7 FL (ref 80–99)
MONOCYTES # BLD: 1.8 K/UL (ref 0–1)
MONOCYTES NFR BLD: 8 % (ref 5–13)
NEUTS SEG # BLD: 19.2 K/UL (ref 1.8–8)
NEUTS SEG NFR BLD: 85 % (ref 32–75)
NRBC # BLD: 0 K/UL (ref 0–0.01)
NRBC BLD-RTO: 0 PER 100 WBC
PHOSPHATE SERPL-MCNC: 2.9 MG/DL (ref 2.6–4.7)
PLATELET # BLD AUTO: 232 K/UL (ref 150–400)
PMV BLD AUTO: 10.3 FL (ref 8.9–12.9)
POTASSIUM SERPL-SCNC: 3.6 MMOL/L (ref 3.5–5.1)
RBC # BLD AUTO: 3.8 M/UL (ref 3.8–5.2)
SERVICE CMNT-IMP: ABNORMAL
SODIUM SERPL-SCNC: 143 MMOL/L (ref 136–145)
WBC # BLD AUTO: 22.4 K/UL (ref 3.6–11)

## 2022-06-16 PROCEDURE — 74011250637 HC RX REV CODE- 250/637: Performed by: SURGERY

## 2022-06-16 PROCEDURE — 74011000250 HC RX REV CODE- 250: Performed by: ANESTHESIOLOGY

## 2022-06-16 PROCEDURE — 74011636637 HC RX REV CODE- 636/637: Performed by: SURGERY

## 2022-06-16 PROCEDURE — 82962 GLUCOSE BLOOD TEST: CPT

## 2022-06-16 PROCEDURE — 36415 COLL VENOUS BLD VENIPUNCTURE: CPT

## 2022-06-16 PROCEDURE — 74011000250 HC RX REV CODE- 250: Performed by: SURGERY

## 2022-06-16 PROCEDURE — 84100 ASSAY OF PHOSPHORUS: CPT

## 2022-06-16 PROCEDURE — 77010033678 HC OXYGEN DAILY

## 2022-06-16 PROCEDURE — 99233 SBSQ HOSP IP/OBS HIGH 50: CPT | Performed by: CLINICAL NURSE SPECIALIST

## 2022-06-16 PROCEDURE — 83735 ASSAY OF MAGNESIUM: CPT

## 2022-06-16 PROCEDURE — 74011250636 HC RX REV CODE- 250/636: Performed by: SURGERY

## 2022-06-16 PROCEDURE — 85025 COMPLETE CBC W/AUTO DIFF WBC: CPT

## 2022-06-16 PROCEDURE — 80048 BASIC METABOLIC PNL TOTAL CA: CPT

## 2022-06-16 PROCEDURE — 65270000046 HC RM TELEMETRY

## 2022-06-16 RX ADMIN — LEVOTHYROXINE SODIUM 137 MCG: 0.11 TABLET ORAL at 13:15

## 2022-06-16 RX ADMIN — ONDANSETRON 4 MG: 2 INJECTION INTRAMUSCULAR; INTRAVENOUS at 05:45

## 2022-06-16 RX ADMIN — ONDANSETRON 4 MG: 2 INJECTION INTRAMUSCULAR; INTRAVENOUS at 14:36

## 2022-06-16 RX ADMIN — ACETAMINOPHEN 325MG 650 MG: 325 TABLET ORAL at 23:37

## 2022-06-16 RX ADMIN — SODIUM CHLORIDE, PRESERVATIVE FREE 10 ML: 5 INJECTION INTRAVENOUS at 21:42

## 2022-06-16 RX ADMIN — Medication 4 UNITS: at 18:22

## 2022-06-16 RX ADMIN — ACETAMINOPHEN 325MG 650 MG: 325 TABLET ORAL at 18:22

## 2022-06-16 RX ADMIN — Medication 4 UNITS: at 05:52

## 2022-06-16 RX ADMIN — Medication 4 UNITS: at 13:13

## 2022-06-16 RX ADMIN — Medication 2 UNITS: at 23:37

## 2022-06-16 RX ADMIN — SODIUM CHLORIDE, PRESERVATIVE FREE 10 ML: 5 INJECTION INTRAVENOUS at 05:52

## 2022-06-16 RX ADMIN — SODIUM CHLORIDE 125 ML/HR: 9 INJECTION, SOLUTION INTRAVENOUS at 23:31

## 2022-06-16 RX ADMIN — SODIUM CHLORIDE 125 ML/HR: 9 INJECTION, SOLUTION INTRAVENOUS at 06:58

## 2022-06-16 RX ADMIN — ACETAMINOPHEN 325MG 650 MG: 325 TABLET ORAL at 05:52

## 2022-06-16 RX ADMIN — AMLODIPINE BESYLATE 10 MG: 5 TABLET ORAL at 13:14

## 2022-06-16 RX ADMIN — SODIUM CHLORIDE, PRESERVATIVE FREE 10 ML: 5 INJECTION INTRAVENOUS at 14:37

## 2022-06-16 RX ADMIN — ENOXAPARIN SODIUM 40 MG: 100 INJECTION SUBCUTANEOUS at 05:52

## 2022-06-16 RX ADMIN — SODIUM CHLORIDE 125 ML/HR: 9 INJECTION, SOLUTION INTRAVENOUS at 14:39

## 2022-06-16 RX ADMIN — ONDANSETRON 4 MG: 2 INJECTION INTRAMUSCULAR; INTRAVENOUS at 10:47

## 2022-06-16 RX ADMIN — ACETAMINOPHEN 325MG 650 MG: 325 TABLET ORAL at 13:15

## 2022-06-16 RX ADMIN — ONDANSETRON 4 MG: 2 INJECTION INTRAMUSCULAR; INTRAVENOUS at 18:37

## 2022-06-16 NOTE — PROGRESS NOTES
End of Shift Note    Bedside shift change report given to Anjali Patel RN (oncoming nurse) by Kashif Tse (offgoing nurse). Report included the following information SBAR, Kardex, ED Summary, Intake/Output, MAR and Recent Results    Shift worked:  [id number]     Shift summary and any significant changes:     PCA pump/ uneventful night      Concerns for physician to address:       Zone phone for oncoming shift:          Activity:  Activity Level: Bed Rest  Number times ambulated in hallways past shift: 0  Number of times OOB to chair past shift: 0    Cardiac:   Cardiac Monitoring: Yes      Cardiac Rhythm: Sinus Rhythm    Access:   Current line(s): PIV     Genitourinary:   Urinary status: frederick    Respiratory:   O2 Device: Nasal cannula  Chronic home O2 use?: NO  Incentive spirometer at bedside: YES       GI:  Last Bowel Movement Date: 06/14/22  Current diet:  DIET NPO  Passing flatus: YES  Tolerating current diet: YES       Pain Management:   Patient states pain is manageable on current regimen: YES    Skin:  Saw Score: 17  Interventions: float heels, increase time out of bed, foam dressing, PT/OT consult and internal/external urinary devices    Patient Safety:  Fall Score:  Total Score: 1  Interventions: bed/chair alarm, gripper socks, pt to call before getting OOB and stay with me (per policy)       Length of Stay:  Expected LOS: - - -  Actual LOS: [address]

## 2022-06-16 NOTE — H&P
H&P    Assessment:   PANCREATIC CA    Body mass index is 21.78 kg/m². Plan:   DIAGNOSTIC LAPAROSCOPY, DISTAL PANCREATECTOMY, SPLENECTOMY (N/A Abdomen) Discussed the risk of surgery including bleeding, infection, injury to adjacent structures, and the risks of general anesthetic. The patient understands the risks; any and all questions were answered to the patient's satisfaction. The patient was counseled at length about the risks of kassandra Covid-19 during their perioperative period and any recovery window from their procedure. The patient was made aware that kassandra Covid-19  may worsen their prognosis for recovering from their procedure and lend to a higher morbidity and/or mortality risk. All material risks, benefits, and reasonable alternatives including postponing the procedure were discussed. The patient wishes to proceed with the procedure at this time. Subjective: Jenny Vargas is a 66 y.o. female who presents for above procedure. Objective:   Blood pressure (!) 127/57, pulse 94, temperature 97.3 °F (36.3 °C), resp. rate 25, height 5' 6\" (1.676 m), weight 61.2 kg (134 lb 14.7 oz), SpO2 92 %.   Temp (24hrs), Av.8 °F (36.6 °C), Min:97.3 °F (36.3 °C), Max:98.4 °F (36.9 °C)      Physical Exam:  PHYSICAL EXAM:    Chest:   [x]   CTA bialterally, no wheezing/rhonchi/rales/crackles    []   wheezing     []   rhonchi     []   crackles     []   use of accessory muscles    Heart:  [x]   regular rate and rhythm     [x]   No murmurs/rubs/gallops    []   irregular rhythm     []   Murmur     []   Rubs     []   Gallops         Past Medical History:   Diagnosis Date    Arthritis     fingers    CAD (coronary artery disease)     Cancer ([facility] [address]     melanoma - left forearm    Cancer ([facility] [address]     pancreatic    Carotid artery disease ([facility] [address]     Diabetes ([facility] [address]     Type 2    Hypercholesterolemia     Hypertension     Long term current use of anticoagulant therapy     ASA    Thyroid disease thyroidectomy     Past Surgical History:   Procedure Laterality Date    HX CHOLECYSTECTOMY  1990's    HX COLONOSCOPY      HX CORONARY ARTERY BYPASS GRAFT  06/26/09    x3    HX HEART CATHETERIZATION      HX OTHER SURGICAL  2007    thyroidectomy     HX OTHER SURGICAL  07-    wide reexcision of left forearm intermediate melanoma    HX THYROIDECTOMY  2006    HX TONSILLECTOMY      as a child    [place] CABG, ARTERY-VEIN, THREE  2009    bypass      Family History   Problem Relation Age of Onset    Heart Disease Mother     Lung Disease Father         rare form of pneumonia    No Known Problems Brother      Social History     Socioeconomic History    Marital status: SINGLE   Tobacco Use    Smoking status: Never Smoker    Smokeless tobacco: Never Used   Vaping Use    Vaping Use: Never used   Substance and Sexual Activity    Alcohol use: No    Drug use: No    Sexual activity: Not Currently      Prior to Admission medications    Medication Sig Start Date End Date Taking? Authorizing Provider   Cetirizine (ZYRTEC) 10 mg cap Take 1 Tablet by mouth daily. Yes Provider, Historical   amLODIPine (NORVASC) 10 mg tablet Take 10 mg by mouth daily. Yes Provider, Historical   metoprolol (LOPRESSOR) 25 mg tablet Take 25 mg by mouth two (2) times a day. Yes Provider, Historical   lovastatin (MEVACOR) 40 mg tablet Take 40 mg by mouth nightly. Yes Provider, Historical   metFORMIN (GLUCOPHAGE) 500 mg tablet Take 1,000 mg by mouth two (2) times daily (with meals). Yes Provider, Historical   levothyroxine (SYNTHROID) 137 mcg tablet Take 137 mcg by mouth Daily (before breakfast). Yes Provider, Historical   ibuprofen (Motrin IB) 200 mg tablet Take 200 mg by mouth every six (6) hours as needed for Pain. Provider, Historical   aspirin-acetaminophen-caffeine (EXCEDRIN ES) 250-250-65 mg per tablet Take 1 Tablet by mouth every six (6) hours as needed for Headache.     Provider, Historical   multivitamin with minerals (HAIR,SKIN AND NAILS PO) Take 1 Tablet by mouth daily as needed. Provider, Historical   naproxen sodium (ALEVE PO) Take 1 Tablet by mouth as needed. Provider, Historical   SITagliptin (JANUVIA) 100 mg tablet Take 100 mg by mouth daily (with lunch). Provider, Historical   aspirin delayed-release 81 mg tablet Take 81 mg by mouth daily. Provider, Historical   fluticasone (FLONASE ALLERGY RELIEF) 50 mcg/actuation nasal spray 2 Sprays by Both Nostrils route as needed for Rhinitis. Patient not taking: Reported on 6/15/2022    Provider, Historical     ALLERGIES:    Allergies   Allergen Reactions    Beta-Blockers (Beta-Adrenergic Blocking Agts) Swelling     Patient was told that she swelled from Beta blockers    Codeine Nausea and Vomiting       Review of Systems:    See prior consult, office note or scanned list in media section. 10 systems negative except as specified.                 Signed By: Raulito Holguin MD     June 16, 2022

## 2022-06-16 NOTE — PROGRESS NOTES
Surgery NP Progress Note    Heather Copeland  [id number]  female  66 y.o.  1950    s/p DIAGNOSTIC LAPAROSCOPY, DISTAL PANCREATECTOMY, SPLENECTOMY on 6/15/2022   Pt seen with Dr. Briana Díaz    Assessment:   Active Problems:    Pancreas carcinoma ([address] (6/15/2022)        Expected post-op progress. Plan/Recommendations/Medical Decision Making:     - Mobilize with nursing and OOB to chair  - NPO with sips and chips  - Pain management- Continue current pain control methods.   - VTE Prophylaxis: Lovenox  - D/C frederick, NGT and tele  - Transfer to surgery floor. Subjective:     Patient with expected post-op pain. No bowel function yet. Some nausea. Objective:     Blood pressure (!) 127/57, pulse 94, temperature 98 °F (36.7 °C), resp. rate 25, height 5' 6\" (1.676 m), weight 61.2 kg (134 lb 14.7 oz), SpO2 92 %. Temp (24hrs), Av.9 °F (36.6 °C), Min:97.3 °F (36.3 °C), Max:98.4 °F (36.9 °C)      Pt resting in bed. NAD   Incisions CDI. Drain in place with serosang output   SCDs for mechanical DVT proph while in bed    Body mass index is 21.78 kg/m². Reference: BMI greater than 30 is classified as obesity and greater than 40 is classified as morbid obesity.      Last 3 Recorded Weights in this Encounter    06/15/22 0703   Weight: 61.2 kg (134 lb 14.7 oz)         Jolie Ramirez NP   MSN, APRN, FNP-C, CWOCN-AP, RNFA    22

## 2022-06-16 NOTE — DIABETES MGMT
[address] [facility]    CLINICAL NURSE SPECIALIST CONSULT     Initial Presentation   Uma Eisenberg is a 66 y.o. female admitted 6/15/22 for surgical intervention. Prior to admission, seen by Dr. Elder Beltran and diagnosed with pancreatic cancer; obtained cardiac clearance via Dr. Yinka Mackenzie. 11/5/21 MRI ABd: Pancreas: Mass in the body the pancreas measuring 17 x 17 mm with distal pancreatic atrophy and ductal dilatation as well as side branch ectasia. The lesion is relatively hypointense on T2-weighted images  2/1/22 CT ABd: Hypoenhancing mass in the pancreatic neck persists, measuring approximately 2.6 x 2.2 cm (image 41) without significant change since MRI allowing for differences in imaging modality. There is upstream pancreatic duct dilation with two foci of side branch ectasia of approximately 1 cm size each. There is generalized pancreatic atrophy. Regional vessels are uninvolved. Regional nodes are stable, largest measuring 1.3 cm (image 69).  5/5/22 CT Pelvis: PANCREAS: Dilatation of the pancreatic duct. 2.8 x 3.0 x 2.2 cm mass in the body of the pancreas previously measured 2.4 x 2.6 x 2.3 cm. There are multiple peripancreatic lymph nodes. There are 2 lymph nodes on image 52. One of the lymph nodes is seen anterior to the pancreatic body measures 7.1 mm. The second lymph node is seen posterior to the SMV and measures 10 mm. This previously measured 8 mm. There are several smaller lymph nodes adjacent to a right SMA branch on image 55. There is associated side branch ectasia. A lymph node in the right retroperitoneum measures 10 mm and is stable. A lymph node on coronal image 63 measures 11 mm and is stable.     HX:   Past Medical History:   Diagnosis Date    Arthritis     fingers    CAD (coronary artery disease)     Cancer ([address] 2013    melanoma - left forearm    Cancer ([address] 2022    pancreatic    Carotid artery disease ([address]     Diabetes ([address]     Type 2    Hypercholesterolemia     Hypertension     Long term current use of anticoagulant therapy     ASA    Thyroid disease     thyroidectomy      INITIAL DX:   Pancreas carcinoma ([facility] [C25.9]     Current Treatment     TX: 6/15/22 DIAGNOSTIC LAPAROSCOPY, DISTAL PANCREATECTOMY, SPLENECTOMY     Consulted by Provider for advanced diabetes nursing assessment and care for:   [] Transitioning off Mateo Guile   [x] Inpatient management strategy  [] Home management assessment  [x] Survival skill education    Hospital Course   Clinical progress has been complicated by type of surgery. 6/16/22 Alert & oriented. Reporting nausea with NG in place; given Zofran. Pain control via Dilaudid PCA     Diabetes History   Type 2 diabetes on oral agents  Family history of diabetes in maternal aunt  No current A1c    Diabetes-related Medical History  Macrovascular disease  CAD    Diabetes Medication History  Key Antihyperglycemic Medications             metFORMIN (GLUCOPHAGE) 500 mg tablet (Taking) Take 1,000 mg by mouth two (2) times daily (with meals). SITagliptin (JANUVIA) 100 mg tablet Take 100 mg by mouth daily (with lunch). Subjective   My aunt had diabetes.      Objective   Physical exam  General Thin female who is ill-appearing. Cooperative. Reporting nausea with NG in place  Neuro  Alert, oriented   Vital Signs   Visit Vitals  BP (!) 130/56 (BP 1 Location: Left upper arm, BP Patient Position: At rest)   Pulse 76   Temp 98.1 °F (36.7 °C)   Resp 19   Ht 5' 6\" (1.676 m)   Wt 61.2 kg (134 lb 14.7 oz)   SpO2 99%   BMI 21.78 kg/m²     Laboratory  Recent Labs     06/16/22  0443   *   AGAP 6   WBC 22.4*   CREA 0.76   GFRNA >60     Factors impacting BG management  Factor Dose Comments   Nutrition:  NPO     Pain Dilaudid PCA Reporting nausea   Infection Cefoxitin X1  Afebrile.  Elevated WBC   Other:   Kidney function  Liver function   Normal  Normal      Blood glucose pattern      Significant diabetes-related events over the past 24-72 hours  Admission . GFR >60  6/15/22 DISTAL PANCREATECTOMY  On corrective insulin => used 28 units of total insulin post-operatively to bring BG to target    Assessment and Plan   Nursing Diagnosis Risk for unstable blood glucose pattern   Nursing Intervention Domain 5255 Decision-making Support   Nursing Interventions Examined current inpatient diabetes/blood glucose control   Explored factors facilitating and impeding inpatient management  Explored corrective strategies with patient and responsible inpatient provider   Informed patient of rational for insulin strategy while hospitalized     Evaluation   This thin  female was admitted for surgical intervention and underwent removal of pancreatic mass. Consulted by surgeon to address BG management. Patient was treated for Type 2 diabetes with oral agents PTA. Patient was nauseous at the time of this visit so unable to learn whether she was actually taking her medication. According to OP notes, patient had a gap in primary care so I'm curious whether she had up-to-date prescriptions. Upon admission, BG elevated in the 200s so her pancreatic function was questionable before the surgery and will likely diminish as a result of the amount of pancreatic beta cell tissue that was removed. Since it took 28 units of insulin since surgery to bring BGs to target, will need insulin at least initially to obtain BG control. Would start a low dose of basal insulin and work toward a discharge dose. This patient would benefit from diabetes self-management education and support [facility]) after discharge.     Recommendations     [x] Use of Subcutaneous Insulin Order set (5401)  Insulin Dosing Specific recommendation   Basal                                      (Based on weight, BMI & GFR) [x]        0.2 units/kg/D  [] 0.3 units/kg/D  [] 0.4 units/kg/D Lantus insulin 12 units D   Nutritional                                      (Based on CHO/dextrose load) [] Normal sensitivity  [] Insulin-resistant sensitivity    Corrective                                       (Useful in adjusting insulin dosing) [] Normal sensitivity  [x] HIGH sensitivity  [] Insulin-resistant sensitivity      [x] Referral to  [x] [facility] (Phone [phone] to schedule appointment) for [employer]    Billing Code(s)   [x] 08988 IP subsequent hospital care - 35 minutes [] 67993 Prolonged Services - 65 minutes [] 31807 Prolonged Services - 110 minutes  [] 61463 IP subsequent hospital care - 25 minutes [] 24106 Prolonged Services - 55 minutes [] 72053 Prolonged Services - 100 minutes  [] 58765 IP subsequent hospital care - 15 minutes [] 27448 Prolonged Services - 45 minutes [] 49721 Prolonged Services - 90 minutes    Before making these care recommendations, I personally reviewed the hospitalization record, including notes, laboratory & diagnostic data and current medications, and examined the patient at the bedside (circumstances permitting) before making care recommendations. More than fifty (50) percent of the time was spent in patient counseling and/or care coordination.   Total minutes: [address], CNS  Diabetes Clinical Nurse Specialist  [facility]  Access via [address] [place] [address]

## 2022-06-17 LAB
ABO + RH BLD: NORMAL
ANION GAP SERPL CALC-SCNC: 7 MMOL/L (ref 5–15)
BASOPHILS # BLD: 0 K/UL (ref 0–0.1)
BASOPHILS NFR BLD: 0 % (ref 0–1)
BLD PROD TYP BPU: NORMAL
BLD PROD TYP BPU: NORMAL
BLOOD GROUP ANTIBODIES SERPL: NORMAL
BPU ID: NORMAL
BPU ID: NORMAL
BUN SERPL-MCNC: 6 MG/DL (ref 6–20)
BUN/CREAT SERPL: 10 (ref 12–20)
CALCIUM SERPL-MCNC: 8.1 MG/DL (ref 8.5–10.1)
CHLORIDE SERPL-SCNC: 109 MMOL/L (ref 97–108)
CO2 SERPL-SCNC: 26 MMOL/L (ref 21–32)
CREAT SERPL-MCNC: 0.59 MG/DL (ref 0.55–1.02)
CROSSMATCH RESULT,%XM: NORMAL
CROSSMATCH RESULT,%XM: NORMAL
DIFFERENTIAL METHOD BLD: ABNORMAL
EOSINOPHIL # BLD: 0 K/UL (ref 0–0.4)
EOSINOPHIL NFR BLD: 0 % (ref 0–7)
ERYTHROCYTE [DISTWIDTH] IN BLOOD BY AUTOMATED COUNT: 13 % (ref 11.5–14.5)
GLUCOSE BLD STRIP.AUTO-MCNC: 176 MG/DL (ref 65–117)
GLUCOSE BLD STRIP.AUTO-MCNC: 194 MG/DL (ref 65–117)
GLUCOSE BLD STRIP.AUTO-MCNC: 200 MG/DL (ref 65–117)
GLUCOSE BLD STRIP.AUTO-MCNC: 211 MG/DL (ref 65–117)
GLUCOSE BLD STRIP.AUTO-MCNC: 232 MG/DL (ref 65–117)
GLUCOSE BLD STRIP.AUTO-MCNC: 260 MG/DL (ref 65–117)
GLUCOSE SERPL-MCNC: 180 MG/DL (ref 65–100)
HCT VFR BLD AUTO: 32.1 % (ref 35–47)
HGB BLD-MCNC: 10.4 G/DL (ref 11.5–16)
IMM GRANULOCYTES # BLD AUTO: 0 K/UL (ref 0–0.04)
IMM GRANULOCYTES NFR BLD AUTO: 0 % (ref 0–0.5)
LYMPHOCYTES # BLD: 1.3 K/UL (ref 0.8–3.5)
LYMPHOCYTES NFR BLD: 5 % (ref 12–49)
MAGNESIUM SERPL-MCNC: 1.7 MG/DL (ref 1.6–2.4)
MCH RBC QN AUTO: 30.1 PG (ref 26–34)
MCHC RBC AUTO-ENTMCNC: 32.4 G/DL (ref 30–36.5)
MCV RBC AUTO: 92.8 FL (ref 80–99)
MONOCYTES # BLD: 1.9 K/UL (ref 0–1)
MONOCYTES NFR BLD: 7 % (ref 5–13)
NEUTS SEG # BLD: 23.4 K/UL (ref 1.8–8)
NEUTS SEG NFR BLD: 88 % (ref 32–75)
NRBC # BLD: 0 K/UL (ref 0–0.01)
NRBC BLD-RTO: 0 PER 100 WBC
PHOSPHATE SERPL-MCNC: 1.9 MG/DL (ref 2.6–4.7)
PLATELET # BLD AUTO: 240 K/UL (ref 150–400)
PMV BLD AUTO: 10.5 FL (ref 8.9–12.9)
POTASSIUM SERPL-SCNC: 3.1 MMOL/L (ref 3.5–5.1)
RBC # BLD AUTO: 3.46 M/UL (ref 3.8–5.2)
RBC MORPH BLD: ABNORMAL
SERVICE CMNT-IMP: ABNORMAL
SODIUM SERPL-SCNC: 142 MMOL/L (ref 136–145)
SPECIMEN EXP DATE BLD: NORMAL
STATUS OF UNIT,%ST: NORMAL
STATUS OF UNIT,%ST: NORMAL
UNIT DIVISION, %UDIV: 0
UNIT DIVISION, %UDIV: 0
WBC # BLD AUTO: 26.6 K/UL (ref 3.6–11)

## 2022-06-17 PROCEDURE — 83735 ASSAY OF MAGNESIUM: CPT

## 2022-06-17 PROCEDURE — 36415 COLL VENOUS BLD VENIPUNCTURE: CPT

## 2022-06-17 PROCEDURE — 74011636637 HC RX REV CODE- 636/637: Performed by: SURGERY

## 2022-06-17 PROCEDURE — 97110 THERAPEUTIC EXERCISES: CPT

## 2022-06-17 PROCEDURE — 82962 GLUCOSE BLOOD TEST: CPT

## 2022-06-17 PROCEDURE — 97116 GAIT TRAINING THERAPY: CPT

## 2022-06-17 PROCEDURE — 74011250637 HC RX REV CODE- 250/637: Performed by: SURGERY

## 2022-06-17 PROCEDURE — 84100 ASSAY OF PHOSPHORUS: CPT

## 2022-06-17 PROCEDURE — 77030027138 HC INCENT SPIROMETER -A

## 2022-06-17 PROCEDURE — 99232 SBSQ HOSP IP/OBS MODERATE 35: CPT | Performed by: CLINICAL NURSE SPECIALIST

## 2022-06-17 PROCEDURE — 97161 PT EVAL LOW COMPLEX 20 MIN: CPT

## 2022-06-17 PROCEDURE — 74011000250 HC RX REV CODE- 250: Performed by: SURGERY

## 2022-06-17 PROCEDURE — 65270000029 HC RM PRIVATE

## 2022-06-17 PROCEDURE — 97530 THERAPEUTIC ACTIVITIES: CPT | Performed by: OCCUPATIONAL THERAPIST

## 2022-06-17 PROCEDURE — 80048 BASIC METABOLIC PNL TOTAL CA: CPT

## 2022-06-17 PROCEDURE — 74011250636 HC RX REV CODE- 250/636: Performed by: SURGERY

## 2022-06-17 PROCEDURE — 51798 US URINE CAPACITY MEASURE: CPT

## 2022-06-17 PROCEDURE — 85025 COMPLETE CBC W/AUTO DIFF WBC: CPT

## 2022-06-17 PROCEDURE — 97165 OT EVAL LOW COMPLEX 30 MIN: CPT | Performed by: OCCUPATIONAL THERAPIST

## 2022-06-17 PROCEDURE — 77010033678 HC OXYGEN DAILY

## 2022-06-17 RX ORDER — HYDROCODONE BITARTRATE AND ACETAMINOPHEN 5; 325 MG/1; MG/1
1-2 TABLET ORAL
Status: DISCONTINUED | OUTPATIENT
Start: 2022-06-17 | End: 2022-06-23 | Stop reason: HOSPADM

## 2022-06-17 RX ORDER — DEXTROSE, SODIUM CHLORIDE, AND POTASSIUM CHLORIDE 5; .45; .3 G/100ML; G/100ML; G/100ML
INJECTION INTRAVENOUS CONTINUOUS
Status: DISCONTINUED | OUTPATIENT
Start: 2022-06-17 | End: 2022-06-23 | Stop reason: HOSPADM

## 2022-06-17 RX ORDER — HYDROMORPHONE HYDROCHLORIDE 1 MG/ML
0.5 INJECTION, SOLUTION INTRAMUSCULAR; INTRAVENOUS; SUBCUTANEOUS
Status: DISCONTINUED | OUTPATIENT
Start: 2022-06-17 | End: 2022-06-23 | Stop reason: HOSPADM

## 2022-06-17 RX ORDER — POTASSIUM CHLORIDE 7.45 MG/ML
10 INJECTION INTRAVENOUS ONCE
Status: COMPLETED | OUTPATIENT
Start: 2022-06-17 | End: 2022-06-18

## 2022-06-17 RX ORDER — POTASSIUM CHLORIDE 20 MEQ/1
20 TABLET, EXTENDED RELEASE ORAL 2 TIMES DAILY
Status: DISCONTINUED | OUTPATIENT
Start: 2022-06-17 | End: 2022-06-23 | Stop reason: HOSPADM

## 2022-06-17 RX ADMIN — LEVOTHYROXINE SODIUM 137 MCG: 0.11 TABLET ORAL at 08:37

## 2022-06-17 RX ADMIN — Medication 6 UNITS: at 14:25

## 2022-06-17 RX ADMIN — SODIUM CHLORIDE, PRESERVATIVE FREE 10 ML: 5 INJECTION INTRAVENOUS at 15:05

## 2022-06-17 RX ADMIN — SODIUM PHOSPHATE, MONOBASIC, MONOHYDRATE 15 MMOL: 276; 142 INJECTION, SOLUTION INTRAVENOUS at 12:00

## 2022-06-17 RX ADMIN — ACETAMINOPHEN 325MG 650 MG: 325 TABLET ORAL at 06:11

## 2022-06-17 RX ADMIN — ENOXAPARIN SODIUM 40 MG: 100 INJECTION SUBCUTANEOUS at 06:11

## 2022-06-17 RX ADMIN — POTASSIUM CHLORIDE 20 MEQ: 20 TABLET, EXTENDED RELEASE ORAL at 10:55

## 2022-06-17 RX ADMIN — DEXTROSE MONOHYDRATE, SODIUM CHLORIDE, AND POTASSIUM CHLORIDE: 50; 4.5; 2.98 INJECTION, SOLUTION INTRAVENOUS at 10:53

## 2022-06-17 RX ADMIN — POTASSIUM CHLORIDE 10 MEQ: 7.46 INJECTION, SOLUTION INTRAVENOUS at 10:54

## 2022-06-17 RX ADMIN — ACETAMINOPHEN 325MG 650 MG: 325 TABLET ORAL at 14:08

## 2022-06-17 RX ADMIN — ACETAMINOPHEN 325MG 650 MG: 325 TABLET ORAL at 23:58

## 2022-06-17 RX ADMIN — HYDROCODONE BITARTRATE AND ACETAMINOPHEN 1 TABLET: 5; 325 TABLET ORAL at 11:18

## 2022-06-17 RX ADMIN — POTASSIUM CHLORIDE 20 MEQ: 20 TABLET, EXTENDED RELEASE ORAL at 18:03

## 2022-06-17 RX ADMIN — ACETAMINOPHEN 325MG 650 MG: 325 TABLET ORAL at 20:06

## 2022-06-17 RX ADMIN — AMLODIPINE BESYLATE 10 MG: 5 TABLET ORAL at 08:37

## 2022-06-17 RX ADMIN — Medication 4 UNITS: at 06:11

## 2022-06-17 RX ADMIN — SODIUM CHLORIDE, PRESERVATIVE FREE 10 ML: 5 INJECTION INTRAVENOUS at 06:11

## 2022-06-17 RX ADMIN — ONDANSETRON 4 MG: 2 INJECTION INTRAMUSCULAR; INTRAVENOUS at 06:15

## 2022-06-17 RX ADMIN — SODIUM CHLORIDE 125 ML/HR: 9 INJECTION, SOLUTION INTRAVENOUS at 08:40

## 2022-06-17 NOTE — PROGRESS NOTES
5 Spoke with MD Bree Bell via phone to update him about pt urinary retention. Pt has not voided since removal of frederick at 1430. MD instructed to straight cath pt and will reevaluate retention in AM.    Spoke with MD Bree Bell about pt being K 3.1on AM labs. MD stated that he would address K in AM rounds. End of Shift Note    Bedside shift change report given to Floretta Hammans, RN (oncoming nurse) by [place] [facility] (offgoing nurse). Report included the following information SBAR, Kardex, ED Summary, Intake/Output, MAR and Recent Results    Shift worked:  9634-2524     Shift summary and any significant changes:     See above note     Concerns for physician to address:       Zone phone for oncoming shift:          Activity:  Activity Level: Bed Rest  Number times ambulated in hallways past shift: 0  Number of times OOB to chair past shift: 1    Cardiac:   Cardiac Monitoring: Yes      Cardiac Rhythm: Sinus Rhythm    Access:   Current line(s): PIV     Genitourinary:   Urinary status: straight cath    Respiratory:   O2 Device: Nasal cannula (Desats with sleep)  Chronic home O2 use?: NO  Incentive spirometer at bedside: YES  Actual Volume (ml): 500 ml    GI:  Last Bowel Movement Date: 06/14/22  Current diet:  DIET NPO Ice Chips, Sips of Clear Liquids, Popsicles  Passing flatus: YES  Tolerating current diet: YES       Pain Management:   Patient states pain is manageable on current regimen: YES    Skin:  Saw Score: 17  Interventions: float heels, increase time out of bed, foam dressing and PT/OT consult    Patient Safety:  Fall Score:  Total Score: 3  Interventions: bed/chair alarm, gripper socks, pt to call before getting OOB and stay with me (per policy)  High Fall Risk: Yes    Length of Stay:  Expected LOS: 3d 16h  Actual LOS: [facility]

## 2022-06-17 NOTE — PROGRESS NOTES
Attending provider:  Zacarias Schmid MD   PCP:  Radha Hensley MD    Subjective:    Patient seen and examined by me today. Just had to be straight cath'd. No prior h/o urinary retention. Has not been OOB. Pain decreasing. No flatus. Not hungry. Mild nausea. Objective:    Blood pressure (!) 144/57, pulse 92, temperature 98.4 °F (36.9 °C), resp. rate 21, height 5' 6\" (1.676 m), weight 61.2 kg (134 lb 14.7 oz), SpO2 91 %. Drains - SS  Exam - A&O, NAD.  CTAB, RRR. Abdomen soft, ND, hypoactive BS. Appropriately TTP. Dressing CDI. No edema. Assessment:  Procedure(s):  DIAGNOSTIC LAPAROSCOPY, DISTAL PANCREATECTOMY, SPLENECTOMY 6/15/2022    Active Hospital Problems    Diagnosis Date Noted    Pancreas carcinoma ([facility] 06/15/2022     - mild hypokalemia. - acute leukocytosis secondary to splenectomy. - acute DM secondary to pancreatectomy. Plan:  - keep on sips and chips until flatus. She does not want more at this point.    - IVF - add K.  - replete K and Phos.    - DVT prophylaxis - Lovenox. - Adding long-acting insulin. - transferring to floor. Needs to ambulate up there.

## 2022-06-17 NOTE — PROGRESS NOTES
Problem: Mobility Impaired (Adult and Pediatric)  Goal: *Acute Goals and Plan of Care (Insert Text)  Description: FUNCTIONAL STATUS PRIOR TO ADMISSION: Patient was independent and active without use of DME.    HOME SUPPORT PRIOR TO ADMISSION: The patient lived alone with Brother to provide assistance as needed. Physical Therapy Goals  Initiated 6/17/2022  1. Patient will move from supine to sit and sit to supine , scoot up and down, and roll side to side in bed with modified independence within 7 day(s). 2.  Patient will transfer from bed to chair and chair to bed with modified independence using the least restrictive device within 7 day(s). 3.  Patient will perform sit to stand with modified independence within 7 day(s). 4.  Patient will ambulate with modified independence for 150 feet with the least restrictive device within 7 day(s). Outcome: Progressing Towards Goal   PHYSICAL THERAPY EVALUATION  Patient: Uma Eisenberg (66 y.o. female)  Date: 6/17/2022  Primary Diagnosis: Pancreas carcinoma ([facility] [C25.9]  Procedure(s) (LRB):  DIAGNOSTIC LAPAROSCOPY, DISTAL PANCREATECTOMY, SPLENECTOMY (N/A) 2 Days Post-Op   Precautions:        ASSESSMENT  Based on the objective data described below, the patient presents with expected post surgical pain, generalized weakness, decreased activity tolerance, O2 dependence, impaired standing blance, and overall decreased functional independence. Pt received in bed on 2L of O2, requiring encouragement to mobilize due to nausea, pain, and just feeling poorly. Education provided to pt that her symptoms are normal after the surgery that she had and it was very important for her to be out of bed to the chair, mobilizing to the bathroom, and walking 3 times a day with the nursing staff. Pt stated understanding. She demonstrates mobility at [facility] to min a x 1 level, requiring the RW for support.   Pt would be fine to discharge home with the support/assistance of her brother.     Current Level of Function Impacting Discharge (mobility/balance):   Bed Mobility:              Rolling: Stand-by assistance              Supine to Sit: Stand-by assistance              Scooting: Supervision  Transfers:  Sit to Stand: Contact guard assistance;Minimum assistance (one posterior loss of balance)  Stand to Sit: Minimum assistance (posterior loss of balance)  Bed to Chair: Minimum assistance (with Rw)  Ambulation/Gait Training:              Distance (ft): 3 Feet (ft)              Assistive Device: Gait belt;Walker, rolling              Ambulation - Level of Assistance: Contact guard assistance;Minimal assistance     Functional Outcome Measure: The patient scored 55/100 on the Barthel Index outcome measure which is indicative of partial dependence. Other factors to consider for discharge: functioning below her baseline, needs to mobilize more with nursing staff. Patient will benefit from skilled therapy intervention to address the above noted impairments. PLAN :  Recommendations and Planned Interventions: bed mobility training, transfer training, gait training, therapeutic exercises, patient and family training/education and therapeutic activities      Frequency/Duration: Patient will be followed by physical therapy:  5 times a week to address goals. Recommendation for discharge: (in order for the patient to meet his/her long term goals)  Physical therapy at least 2 days/week in the home     This discharge recommendation:  A follow-up discussion with the attending provider and/or case management is planned    IF patient discharges home will need the following DME: rolling water         SUBJECTIVE:   Patient stated Is this really necessary? Arslan Ramos    OBJECTIVE DATA SUMMARY:   HISTORY:    Past Medical History:   Diagnosis Date    Arthritis     fingers    CAD (coronary artery disease)     [facility] 2013    melanoma - left forearm    Cancer ([facility] 2022    pancreatic    [facility]ot[facility]     Diabetes ([facility]     Type 2    Hypercholesterolemia     Hypertension     Long term current use of anticoagulant therapy     ASA    Thyroid disease     thyroidectomy     Past Surgical History:   Procedure Laterality Date    HX CHOLECYSTECTOMY  1990's    HX COLONOSCOPY      HX CORONARY ARTERY BYPASS GRAFT  06/26/09    x3    HX HEART CATHETERIZATION      HX OTHER SURGICAL  2007    thyroidectomy     HX OTHER SURGICAL  07-    wide reexcision of left forearm intermediate melanoma    HX THYROIDECTOMY  2006    HX TONSILLECTOMY      as a child    UT CABG, ARTERY-VEIN, THREE  2009    bypass       Personal factors and/or comorbidities impacting plan of care:  O2 dependent currently    Home Situation  Home Environment: Private residence  # Steps to Enter: 1  One/Two Story Residence: One story  Living Alone: Yes  Support Systems: Other Family Member(s) (brother)  Patient Expects to be Discharged to[de-identified] Home  Current DME Used/Available at Home: None  Tub or Shower Type: Tub/Shower combination    EXAMINATION/PRESENTATION/DECISION MAKING:   Critical Behavior:  Neurologic State: Alert  Orientation Level: Oriented X4  Cognition: Follows commands  Safety/Judgement: Fall prevention  Hearing:   Auditory  Auditory Impairment: None  Hearing Aids/Status: Does not own  Range Of Motion:  AROM: Within functional limits           PROM: Within functional limits           Strength:    Strength: Generally decreased, functional                    Tone & Sensation:   Tone: Normal              Sensation: Intact               Coordination:  Coordination: Within functional limits  Vision:   Acuity: Within Defined Limits  Functional Mobility:  Bed Mobility:  Rolling: Stand-by assistance  Supine to Sit: Stand-by assistance     Scooting: Supervision  Transfers:  Sit to Stand: Contact guard assistance;Minimum assistance (one posterior loss of balance)  Stand to Sit: Minimum assistance (posterior loss of balance)        Bed to Chair: Minimum assistance (with Rw)              Balance:   Sitting: Intact  Standing: Impaired  Standing - Static: Fair  Standing - Dynamic : Fair  Ambulation/Gait Training:  Distance (ft): 3 Feet (ft)  Assistive Device: Gait belt;Walker, rolling  Ambulation - Level of Assistance: Contact guard assistance;Minimal assistance        Gait Abnormalities: Decreased step clearance        Base of Support: Widened     Speed/Missy: Pace decreased (<100 feet/min)  Step Length: Left shortened;Right shortened        Functional Measure:  Barthel Index:    Bathin  Bladder: 10  Bowels: 10  Groomin  Dressin  Feeding: 10  Mobility: 0  Stairs: 0  Toilet Use: 5  Transfer (Bed to Chair and Back): 10  Total: 55/100       The Barthel ADL Index: Guidelines  1. The index should be used as a record of what a patient does, not as a record of what a patient could do. 2. The main aim is to establish degree of independence from any help, physical or verbal, however minor and for whatever reason. 3. The need for supervision renders the patient not independent. 4. A patient's performance should be established using the best available evidence. Asking the patient, friends/relatives and nurses are the usual sources, but direct observation and common sense are also important. However direct testing is not needed. 5. Usually the patient's performance over the preceding 24-48 hours is important, but occasionally longer periods will be relevant. 6. Middle categories imply that the patient supplies over 50 per cent of the effort. 7. Use of aids to be independent is allowed. Score Interpretation (from [address] [address])    Independent   60-79 Minimally independent   40-59 Partially dependent   20-39 Very dependent   <20 Totally dependent     -Vasquez Quiros., Barthel, D.W. (1965). Functional evaluation: the Barthel Index. [address] ([address] ().  The Barthel activities of daily living index: self-reporting versus actual performance in the old (> or = 75 years). Journal [address], [address] [facility], JDELIA.F, Slick Chery.Kate (1999). Measuring the change in disability after inpatient rehabilitation; comparison of the responsiveness of the Barthel Index and Functional [place] Measure. Journal of Neurology, Neurosurgery, and Psychiatry, 66(4), 027-164. ELOY Blas, GRACE Gleason, & Anjelica Ellis M.A. (2004) Assessment of post-stroke quality of life in cost-effectiveness studies: The usefulness of the Barthel Index and the EuroQoL-5D. Quality of Life Research, 13, 427-43      Pain Rating:  Abdomen, not quantified    Activity Tolerance:   Fair and desaturates with exertion and requires oxygen    After treatment patient left in no apparent distress:   Sitting in chair and Call bell within reach    COMMUNICATION/EDUCATION:   The patients plan of care was discussed with: Occupational therapist and Registered nurse. Fall prevention education was provided and the patient/caregiver indicated understanding., Patient/family have participated as able in goal setting and plan of care. and Patient/family agree to work toward stated goals and plan of care.     Thank you for this referral.  Daisy Martinez, PT   Time Calculation: 23 mins

## 2022-06-17 NOTE — PROGRESS NOTES
Bedside report to Floretta Hammans, RN. Pt made progress today up to chair for hours . tolerating sips of water after NGT ou. Pt still having some nausea and hypoactive BS not passing gas yet. HNV yet. Rhythm NSR and vitals stable.

## 2022-06-17 NOTE — DIABETES MGMT
[facility]    CLINICAL NURSE SPECIALIST CONSULT     Initial Presentation   Joanie Dolan is a 66 y.o. female admitted 6/15/22 for surgical intervention. Prior to admission, seen by Dr. David Youssef and diagnosed with pancreatic cancer; obtained cardiac clearance via Dr. Rome Álvarez. 11/5/21 MRI ABd: Pancreas: Mass in the body the pancreas measuring 17 x 17 mm with distal pancreatic atrophy and ductal dilatation as well as side branch ectasia. The lesion is relatively hypointense on T2-weighted images  2/1/22 CT ABd: Hypoenhancing mass in the pancreatic neck persists, measuring approximately 2.6 x 2.2 cm (image 41) without significant change since MRI allowing for differences in imaging modality. There is upstream pancreatic duct dilation with two foci of side branch ectasia of approximately 1 cm size each. There is generalized pancreatic atrophy. Regional vessels are uninvolved. Regional nodes are stable, largest measuring 1.3 cm (image 69).  5/5/22 CT Pelvis: PANCREAS: Dilatation of the pancreatic duct. 2.8 x 3.0 x 2.2 cm mass in the body of the pancreas previously measured 2.4 x 2.6 x 2.3 cm. There are multiple peripancreatic lymph nodes. There are 2 lymph nodes on image 52. One of the lymph nodes is seen anterior to the pancreatic body measures 7.1 mm. The second lymph node is seen posterior to the SMV and measures 10 mm. This previously measured 8 mm. There are several smaller lymph nodes adjacent to a right SMA branch on image 55. There is associated side branch ectasia. A lymph node in the right retroperitoneum measures 10 mm and is stable. A lymph node on coronal image 63 measures 11 mm and is stable.     HX:   Past Medical History:   Diagnosis Date    Arthritis     fingers    CAD (coronary artery disease)     Cancer ([address] 2013    melanoma - left forearm    Cancer ([address] 2022    pancreatic    Carotid artery disease ([address]     Diabetes ([address]     Type 2    Hypercholesterolemia     Hypertension     Long term current use of anticoagulant therapy     ASA    Thyroid disease     thyroidectomy      INITIAL DX:   Pancreas carcinoma ([facility] [C25.9]     Current Treatment     TX: 6/15/22 DIAGNOSTIC LAPAROSCOPY, DISTAL PANCREATECTOMY, SPLENECTOMY   BP managemetn. Clot prevention. Insulin. Synthroid    Consulted by Provider for advanced diabetes nursing assessment and care for:   [] Transitioning off Vernia Parcel   [x] Inpatient management strategy  [] Home management assessment  [x] Survival skill education    Hospital Course   Clinical progress has been complicated by type of surgery. 6/16/22 Alert & oriented. Reporting nausea with NG in place; given Zofran. Pain control via Dilaudid PCA   6/17/22 Looks and feels better. Using O2NC. Intermittent nausea persists. Diabetes History   Type 2 diabetes on oral agents  Family history of diabetes in maternal aunt  No current A1c    Diabetes-related Medical History  Macrovascular disease  CAD    Diabetes Medication History  Key Antihyperglycemic Medications             metFORMIN (GLUCOPHAGE) 500 mg tablet (Taking) Take 1,000 mg by mouth two (2) times daily (with meals). SITagliptin (JANUVIA) 100 mg tablet Take 100 mg by mouth daily (with lunch). Subjective   I do feel better but I still have nausea.      Objective   Physical exam  General Thin female who is in no acute distress. Conversant & cooperative  Neuro  Alert, oriented   Vital Signs   Visit Vitals  BP (!) 144/57   Pulse 92   Temp 98.4 °F (36.9 °C)   Resp 21   Ht 5' 6\" (1.676 m)   Wt 61.2 kg (134 lb 14.7 oz)   SpO2 91%   BMI 21.78 kg/m²     Laboratory  Recent Labs     06/17/22  0410 06/16/22  0443   * 169*   AGAP 7 6   WBC 26.6* 22.4*   CREA 0.59 0.76   GFRNA >60 >60     Factors impacting BG management  Factor Dose Comments   Nutrition:  Sips of CL    Intermittent nausea   Infection Cefoxitin (completed)  Afebrile.  Elevated WBC   Other:   Kidney function  Liver function Normal  Normal      Blood glucose pattern      Significant diabetes-related events over the past 24-72 hours  Admission . GFR >60  6/15/22 DISTAL PANCREATECTOMY  On corrective insulin => used 28 units of total insulin post-operatively to bring BG to target  6/17/22 Used 14 units of corrective insulin yesterday    Assessment and Plan   Nursing Diagnosis Risk for unstable blood glucose pattern   Nursing Intervention Domain 1297 Decision-making Support   Nursing Interventions Examined current inpatient diabetes/blood glucose control   Explored factors facilitating and impeding inpatient management  Explored corrective strategies with patient and responsible inpatient provider   Informed patient of rational for insulin strategy while hospitalized     Evaluation   This thin  female was admitted for surgical intervention and underwent removal of pancreatic mass. Consulted by surgeon to address BG management. Patient was treated for Type 2 diabetes with oral agents PTA. Patient was nauseous yesterday. According to OP notes, patient had a gap in primary care so I'm curious whether she had up-to-date prescriptions. Upon admission, BG elevated in the 200s so her pancreatic function was questionable before the surgery and will likely diminish as a result of the amount of pancreatic beta cell tissue that was removed. It took 28 units of insulin after surgery to bring BGs to target. Used 14 units of corrective insulin yesterday 6/16/22. Again, would recommend starting a basal dose of insulin.     Recommendations     [x] Use of Subcutaneous Insulin Order set (2796)  Insulin Dosing Specific recommendation   Basal                                      (Based on weight, BMI & GFR) [x]        0.2 units/kg/D  [] 0.3 units/kg/D  [] 0.4 units/kg/D StartLantus insulin 12 units D   Nutritional                                      (Based on CHO/dextrose load) [] Normal sensitivity  [] Insulin-resistant sensitivity Corrective                                       (Useful in adjusting insulin dosing) [] Normal sensitivity  [x] HIGH sensitivity  [] Insulin-resistant sensitivity      [x] Referral to  [x] Program for Diabetes [facility] (Phone [phone] to schedule appointment) for [facility]    Billing Code(s)   [] 16544 IP subsequent hospital care - 35 minutes [] 24606 Prolonged Services - 65 minutes [] 07464 Prolonged Services - 110 minutes  [x] 59505 IP subsequent hospital care - 25 minutes [] 87369 Prolonged Services - 55 minutes [] 74929 Prolonged Services - 100 minutes  [] 09046 IP subsequent hospital care - 15 minutes [] 37831 Prolonged Services - 45 minutes [] 72839 Prolonged Services - 90 minutes    Before making these care recommendations, I personally reviewed the hospitalization record, including notes, laboratory & diagnostic data and current medications, and examined the patient at the bedside (circumstances permitting) before making care recommendations. More than fifty (50) percent of the time was spent in patient counseling and/or care coordination.   Total minutes: [address] [place], CNS  Diabetes Clinical Nurse Specialist  [facility]  Access via [address] [place] [address]

## 2022-06-17 NOTE — PROGRESS NOTES
Problem: Self Care Deficits Care Plan (Adult)  Goal: *Acute Goals and Plan of Care (Insert Text)  Description: FUNCTIONAL STATUS PRIOR TO ADMISSION: Patient was independent and active without use of DME.    HOME SUPPORT PRIOR TO ADMISSION: The patient lived alone with brother to provide assistance. Occupational Therapy Goals:  Initiated 6/17/2022  1. Patient will perform grooming standing with modified independence within 7 days. 2. Patient will perform toileting with modified independence within 7 days. 3. Patient will perform upper body dressing and lower body dressing with modified independence within 7 days. 4. Patient will transfer from toilet with modified independence using the least restrictive device and appropriate durable medical equipment within 7 days. Outcome: Not Met   OCCUPATIONAL THERAPY EVALUATION  Patient: Juanita Copeland (66 y.o. female)  Date: 6/17/2022  Primary Diagnosis: Pancreas carcinoma ([facility] [C25.9]  Procedure(s) (LRB):  DIAGNOSTIC LAPAROSCOPY, DISTAL PANCREATECTOMY, SPLENECTOMY (N/A) 2 Days Post-Op   Precautions: none       ASSESSMENT  Based on the objective data described below, the patient presents with decreased activity tolerance, low O2 sat on room air, decreased standing balance and overall ADLS. Pt is POD 2 for elective pancreas surgery. Pt reports that her brother will be able to assist at discharge. Pt was independent prior to admit. She endorsed nausea and pain this session and need encouragement to participate due to report of feeling poorly. It was explained to pt that her symptoms are common for the type of surgery she had. Informed pt on the importance of being out of bed to chair/walking 3x a day with nursing and  mobilizing to bathroom. She voiced understanding. Currently pt is performing mobility and ADLS at [facility] to min assist level. Pt needs RW for support at this time.   Pt was unable to be weaned to room air this session and O2 sat was 86% on room air.  On 2L NC O2 sat was 90-94%. Educated pt on spirometer use. Current Level of Function Impacting Discharge (ADLs/self-care): SBA bed mobility, CGA to min assist transfer with RW  Feeding: Setup    Oral Facial Hygiene/Grooming: Setup (seated )    Bathing: Minimum assistance (LB)    Upper Body Dressing: Setup    Lower Body Dressing: Minimum assistance    Toileting: Minimum assistance     Functional Outcome Measure: The patient scored 55/100 on the barthel outcome measure which is indicative of moderate decline in mobility and ADLs. Other factors to consider for discharge: needs initial assist at discharge, pt needs to mobilize with nursing staff multiple times a day when therapy isn't working with pt     Patient will benefit from skilled therapy intervention to address the above noted impairments. PLAN :  Recommendations and Planned Interventions: self care training, functional mobility training, therapeutic exercise, balance training, therapeutic activities, patient education, home safety training, and family training/education    Frequency/Duration: Patient will be followed by occupational therapy 5 times a week to address goals. Recommendation for discharge: (in order for the patient to meet his/her long term goals)  Occupational therapy at least 2 days/week in the home AND ensure assist and/or supervision for safety with mobility and ADLS as needed    This discharge recommendation:  Has not yet been discussed the attending provider and/or case management    IF patient discharges home will need the following DME: rolling walker       SUBJECTIVE:   Patient stated Do I have to do this?     OBJECTIVE DATA SUMMARY:   HISTORY:   Past Medical History:   Diagnosis Date    Arthritis     fingers    CAD (coronary artery disease)     [facility] ([facility] 2013    melanoma - left forearm    Cancer ([facility] 2022    pancreatic    [facility]ot[facility] disease ([facility]     Diabetes ([facility]     Type 2    Hypercholesterolemia     Hypertension     Long term current use of anticoagulant therapy     ASA    Thyroid disease     thyroidectomy     Past Surgical History:   Procedure Laterality Date    HX CHOLECYSTECTOMY  1990's    HX COLONOSCOPY      HX CORONARY ARTERY BYPASS GRAFT  06/26/09    x3    HX HEART CATHETERIZATION      HX OTHER SURGICAL  2007    thyroidectomy     HX OTHER SURGICAL  07-    wide reexcision of left forearm intermediate melanoma    HX THYROIDECTOMY  2006    HX TONSILLECTOMY      as a child    [place] CABG, ARTERY-VEIN, THREE  2009    bypass       Expanded or extensive additional review of patient history:     Home Situation  Home Environment: Private residence  # Steps to Enter: 1  One/Two Story Residence: One story  Living Alone: Yes  Support Systems: Other Family Member(s) (brother)  Patient Expects to be Discharged to[de-identified] Home  Current DME Used/Available at Home: None  Tub or Shower Type: Tub/Shower combination    Hand dominance: Right    EXAMINATION OF PERFORMANCE DEFICITS:  Cognitive/Behavioral Status:  Neurologic State: Alert  Orientation Level: Oriented X4  Cognition: Follows commands  Perception: Cues to maintain midline in standing  Perseveration: No perseveration noted  Safety/Judgement: Fall prevention    Hearing: Auditory  Auditory Impairment: None  Hearing Aids/Status: Does not own    Vision/Perceptual:                           Acuity: Within Defined Limits         Range of Motion:    AROM: Within functional limits  PROM: Within functional limits                      Strength:    Strength: Generally decreased, functional                Coordination:  Coordination: Within functional limits  Fine Motor Skills-Upper: Left Intact; Right Intact    Gross Motor Skills-Upper: Left Intact; Right Intact    Tone & Sensation:    Tone: Normal  Sensation: Intact                      Balance:  Sitting: Intact  Standing: Impaired  Standing - Static: Fair  Standing - Dynamic : Fair    Functional Mobility and Transfers for ADLs:  Bed Mobility:  Rolling: Stand-by assistance  Supine to Sit: Stand-by assistance  Scooting: Supervision    Transfers:  Sit to Stand: Contact guard assistance;Minimum assistance (one posterior loss of balance)  Stand to Sit: Minimum assistance (posterior loss of balance)  Bed to Chair: Minimum assistance (with Rw)  Bathroom Mobility: Minimum assistance  Toilet Transfer : Minimum assistance    ADL Assessment:  Feeding: Setup    Oral Facial Hygiene/Grooming: Setup (seated )    Bathing: Minimum assistance (LB)    Upper Body Dressing: Setup    Lower Body Dressing: Minimum assistance    Toileting: Minimum assistance                ADL Intervention and task modifications:   Educated on pain reduction techniques (log roll, bracing with pillow to cough, deep breathing, spirometer use and increasing activity with nursing assist)    Cognitive Retraining  Safety/Judgement: Fall prevention       Functional Measure:    Barthel Index:  Bathin  Bladder: 10  Bowels: 10  Groomin  Dressin  Feeding: 10  Mobility: 0  Stairs: 0  Toilet Use: 5  Transfer (Bed to Chair and Back): 10  Total: 55/100      The Barthel ADL Index: Guidelines  1. The index should be used as a record of what a patient does, not as a record of what a patient could do. 2. The main aim is to establish degree of independence from any help, physical or verbal, however minor and for whatever reason. 3. The need for supervision renders the patient not independent. 4. A patient's performance should be established using the best available evidence. Asking the patient, friends/relatives and nurses are the usual sources, but direct observation and common sense are also important. However direct testing is not needed. 5. Usually the patient's performance over the preceding 24-48 hours is important, but occasionally longer periods will be relevant.   6. Middle categories imply that the patient supplies over 50 per cent of the effort. 7. Use of aids to be independent is allowed. Score Interpretation (from [facility])    Independent   60-79 Minimally independent   40-59 Partially dependent   20-39 Very dependent   <20 Totally dependent     -Vasquez Quiros., Barthel, D.W. (1965). Functional evaluation: the Barthel Index. [address] ([address] (1997). The Barthel activities of daily living index: self-reporting versus actual performance in the old (> or = 75 years). Journal of [address] [place] [address] 45(7), [facility], J.J.SRINIVASA.F, Nadya Andrade., Dung Odalis. (1999). Measuring the change in disability after inpatient rehabilitation; comparison of the responsiveness of the Barthel Index and Functional [place] Measure. Journal of Neurology, Neurosurgery, and Psychiatry, 66(4), 540-739. Carmen Rinaldi, NFOUZIA.DIANE, GRACE Gleason, & Benigno Nye, MELIZABETH. (2004) Assessment of post-stroke quality of life in cost-effectiveness studies: The usefulness of the Barthel Index and the EuroQoL-5D.  Quality of Life Research, 15, 962-59         Occupational Therapy Evaluation Charge Determination   History Examination Decision-Making   MEDIUM Complexity : Expanded review of history including physical, cognitive and psychosocial  history  MEDIUM Complexity : 3-5 performance deficits relating to physical, cognitive , or psychosocial skils that result in activity limitations and / or participation restrictions LOW Complexity : No comorbidities that affect functional and no verbal or physical assistance needed to complete eval tasks       Based on the above components, the patient evaluation is determined to be of the following complexity level: LOW   Pain Rating:  Minimal report of pain    Activity Tolerance:   Fair and requires rest breaks, desaturates with activity on room air      After treatment patient left in no apparent distress:    Sitting in chair and Call bell within reach    COMMUNICATION/EDUCATION:   The patients plan of care was discussed with: Physical therapist and Registered nurse. Home safety education was provided and the patient/caregiver indicated understanding., Patient/family have participated as able in goal setting and plan of care. , and Patient/family agree to work toward stated goals and plan of care. This patients plan of care is appropriate for delegation to [facility].     Thank you for this referral.  Bree Bell, OTR/L

## 2022-06-17 NOTE — PROGRESS NOTES
Transition of Care Plan:    RUR:12%  Disposition:Home w/family  Follow up appointments:  DME needed:r/w-referral sent to [place] on 6/17  [facility] [place] or means to access home:        IM Medicare Letter:2nd IM needed  Is patient a BCPI-A Bundle:   n/a        If yes, was Bundle Letter given?:    Is patient a [place] and connected with the [address]? If yes, was [place] transfer form completed and [facility] notified? Caregiver Contact:Alexsandra hutton [phone]  Discharge Caregiver contacted prior to discharge? Care Conference needed?:              CM sent a referral to [place] for a r/w. Patient in agreement w/therapys r/w recommendation. Reason for Admission:  Pancreas carcinoma                   RUR Score:   12%                  Plan for utilizing home health:   Pt declined [place]       PCP: First and Last name:  Meagan Jurado MD     Name of Practice:    Are you a current patient: Yes/No: yes   Approximate date of last visit: Jan 2022   Can you participate in a virtual visit with your PCP:                     Current Advanced Directive/Advance Care Plan: [address] [facility] (ACP) Conversation      Date of Conversation: 6/17/22  Conducted with: Patient with Decision Making Capacity    Healthcare Decision Maker: Alexsandra hutton  No healthcare decision makers have been documented. Click here to complete [address] including selection of the Healthcare Decision Maker Relationship (ie \"Primary\")    Today we documented Decision Maker(s) consistent with Legal Next of Kin hierarchy. Content/Action Overview:   DECLINED ACP conversation - will revisit periodically     Length of Voluntary ACP Conversation in minutes:  <16 minutes (Non-Billable)    Clau Aguirre                        Transition of Care Plan:   CM met w/pt at bedside.   Prior to admission, pt was independent w/ADL/IADL to include driving. No history of HH/Rehab or DME use. Patients support system includes, brother, Olen Schwab &  cousin. CM offered pt HH & pt declined. Patient would like CM to order a r/w. No other needs or concerns identified. PCP-   Lita Rea MD  Pharmacy- [facility] [employer] on [address] [facility] [address] Management Interventions  PCP Verified by CM: Yes  Mode of Transport at Discharge:  Other (see comment) (brother, Olen Schwab)  Transition of Care Consult (CM Consult): Discharge Planning  Discharge Durable Medical Equipment: No (no DME use)  Physical Therapy Consult: Yes  Occupational Therapy Consult: Yes  Speech Therapy Consult: No  Support Systems: Other Family Member(s) (Lives alone in a one story home w/1 [address]ERICK  brother is staying w/pt temp)  Confirm Follow Up Transport: Self  Discharge Location  Patient Expects to be Discharged to[de-identified]  (Anticipate Home w/family)      Aida Manuel  Ext [phone]

## 2022-06-18 LAB
ANION GAP SERPL CALC-SCNC: 8 MMOL/L (ref 5–15)
BASOPHILS # BLD: 0 K/UL (ref 0–0.1)
BASOPHILS NFR BLD: 0 % (ref 0–1)
BUN SERPL-MCNC: 6 MG/DL (ref 6–20)
BUN/CREAT SERPL: 10 (ref 12–20)
CALCIUM SERPL-MCNC: 8.3 MG/DL (ref 8.5–10.1)
CHLORIDE SERPL-SCNC: 112 MMOL/L (ref 97–108)
CO2 SERPL-SCNC: 23 MMOL/L (ref 21–32)
CREAT SERPL-MCNC: 0.59 MG/DL (ref 0.55–1.02)
DIFFERENTIAL METHOD BLD: ABNORMAL
EOSINOPHIL # BLD: 0 K/UL (ref 0–0.4)
EOSINOPHIL NFR BLD: 0 % (ref 0–7)
ERYTHROCYTE [DISTWIDTH] IN BLOOD BY AUTOMATED COUNT: 12.9 % (ref 11.5–14.5)
GLUCOSE BLD STRIP.AUTO-MCNC: 160 MG/DL (ref 65–117)
GLUCOSE BLD STRIP.AUTO-MCNC: 172 MG/DL (ref 65–117)
GLUCOSE BLD STRIP.AUTO-MCNC: 184 MG/DL (ref 65–117)
GLUCOSE BLD STRIP.AUTO-MCNC: 192 MG/DL (ref 65–117)
GLUCOSE BLD STRIP.AUTO-MCNC: 221 MG/DL (ref 65–117)
GLUCOSE SERPL-MCNC: 179 MG/DL (ref 65–100)
HCT VFR BLD AUTO: 30.1 % (ref 35–47)
HGB BLD-MCNC: 9.8 G/DL (ref 11.5–16)
IMM GRANULOCYTES # BLD AUTO: 0.2 K/UL (ref 0–0.04)
IMM GRANULOCYTES NFR BLD AUTO: 1 % (ref 0–0.5)
LYMPHOCYTES # BLD: 2 K/UL (ref 0.8–3.5)
LYMPHOCYTES NFR BLD: 9 % (ref 12–49)
MAGNESIUM SERPL-MCNC: 1.9 MG/DL (ref 1.6–2.4)
MCH RBC QN AUTO: 29.6 PG (ref 26–34)
MCHC RBC AUTO-ENTMCNC: 32.6 G/DL (ref 30–36.5)
MCV RBC AUTO: 90.9 FL (ref 80–99)
MONOCYTES # BLD: 1.6 K/UL (ref 0–1)
MONOCYTES NFR BLD: 7 % (ref 5–13)
NEUTS SEG # BLD: 17.7 K/UL (ref 1.8–8)
NEUTS SEG NFR BLD: 83 % (ref 32–75)
NRBC # BLD: 0.02 K/UL (ref 0–0.01)
NRBC BLD-RTO: 0.1 PER 100 WBC
PHOSPHATE SERPL-MCNC: 1.4 MG/DL (ref 2.6–4.7)
PLATELET # BLD AUTO: 277 K/UL (ref 150–400)
PMV BLD AUTO: 10.3 FL (ref 8.9–12.9)
POTASSIUM SERPL-SCNC: 3.2 MMOL/L (ref 3.5–5.1)
RBC # BLD AUTO: 3.31 M/UL (ref 3.8–5.2)
SERVICE CMNT-IMP: ABNORMAL
SODIUM SERPL-SCNC: 143 MMOL/L (ref 136–145)
WBC # BLD AUTO: 21.5 K/UL (ref 3.6–11)

## 2022-06-18 PROCEDURE — 94760 N-INVAS EAR/PLS OXIMETRY 1: CPT

## 2022-06-18 PROCEDURE — 74011250636 HC RX REV CODE- 250/636: Performed by: SURGERY

## 2022-06-18 PROCEDURE — 77010033678 HC OXYGEN DAILY

## 2022-06-18 PROCEDURE — 83735 ASSAY OF MAGNESIUM: CPT

## 2022-06-18 PROCEDURE — 80048 BASIC METABOLIC PNL TOTAL CA: CPT

## 2022-06-18 PROCEDURE — 82962 GLUCOSE BLOOD TEST: CPT

## 2022-06-18 PROCEDURE — 36415 COLL VENOUS BLD VENIPUNCTURE: CPT

## 2022-06-18 PROCEDURE — 74011636637 HC RX REV CODE- 636/637: Performed by: SURGERY

## 2022-06-18 PROCEDURE — 84100 ASSAY OF PHOSPHORUS: CPT

## 2022-06-18 PROCEDURE — 65270000029 HC RM PRIVATE

## 2022-06-18 PROCEDURE — 74011250637 HC RX REV CODE- 250/637: Performed by: SURGERY

## 2022-06-18 PROCEDURE — 85025 COMPLETE CBC W/AUTO DIFF WBC: CPT

## 2022-06-18 PROCEDURE — 74011000250 HC RX REV CODE- 250: Performed by: SURGERY

## 2022-06-18 RX ORDER — SCOLOPAMINE TRANSDERMAL SYSTEM 1 MG/1
1 PATCH, EXTENDED RELEASE TRANSDERMAL
Status: DISCONTINUED | OUTPATIENT
Start: 2022-06-18 | End: 2022-06-23 | Stop reason: HOSPADM

## 2022-06-18 RX ADMIN — ACETAMINOPHEN 325MG 650 MG: 325 TABLET ORAL at 18:00

## 2022-06-18 RX ADMIN — HYDROCODONE BITARTRATE AND ACETAMINOPHEN 1 TABLET: 5; 325 TABLET ORAL at 04:07

## 2022-06-18 RX ADMIN — SODIUM CHLORIDE, PRESERVATIVE FREE 10 ML: 5 INJECTION INTRAVENOUS at 06:00

## 2022-06-18 RX ADMIN — ENOXAPARIN SODIUM 40 MG: 100 INJECTION SUBCUTANEOUS at 06:17

## 2022-06-18 RX ADMIN — AMLODIPINE BESYLATE 10 MG: 5 TABLET ORAL at 09:34

## 2022-06-18 RX ADMIN — Medication 6 UNITS: at 13:41

## 2022-06-18 RX ADMIN — ACETAMINOPHEN 325MG 650 MG: 325 TABLET ORAL at 11:47

## 2022-06-18 RX ADMIN — LEVOTHYROXINE SODIUM 137 MCG: 0.11 TABLET ORAL at 06:16

## 2022-06-18 RX ADMIN — Medication 8 UNITS: at 00:00

## 2022-06-18 RX ADMIN — HYDROCODONE BITARTRATE AND ACETAMINOPHEN 1 TABLET: 5; 325 TABLET ORAL at 22:27

## 2022-06-18 RX ADMIN — Medication 4 UNITS: at 06:17

## 2022-06-18 RX ADMIN — ONDANSETRON 4 MG: 2 INJECTION INTRAMUSCULAR; INTRAVENOUS at 11:54

## 2022-06-18 RX ADMIN — ACETAMINOPHEN 325MG 650 MG: 325 TABLET ORAL at 06:16

## 2022-06-18 RX ADMIN — POTASSIUM CHLORIDE 20 MEQ: 20 TABLET, EXTENDED RELEASE ORAL at 09:34

## 2022-06-18 RX ADMIN — DEXTROSE MONOHYDRATE, SODIUM CHLORIDE, AND POTASSIUM CHLORIDE: 50; 4.5; 2.98 INJECTION, SOLUTION INTRAVENOUS at 00:01

## 2022-06-18 RX ADMIN — SODIUM CHLORIDE, PRESERVATIVE FREE 10 ML: 5 INJECTION INTRAVENOUS at 14:00

## 2022-06-18 RX ADMIN — SODIUM CHLORIDE, PRESERVATIVE FREE 10 ML: 5 INJECTION INTRAVENOUS at 00:02

## 2022-06-18 RX ADMIN — DEXTROSE MONOHYDRATE, SODIUM CHLORIDE, AND POTASSIUM CHLORIDE: 50; 4.5; 2.98 INJECTION, SOLUTION INTRAVENOUS at 13:40

## 2022-06-18 RX ADMIN — POTASSIUM CHLORIDE 20 MEQ: 20 TABLET, EXTENDED RELEASE ORAL at 18:00

## 2022-06-18 RX ADMIN — Medication 4 UNITS: at 18:11

## 2022-06-18 RX ADMIN — POLYETHYLENE GLYCOL 3350 17 G: 17 POWDER, FOR SOLUTION ORAL at 09:33

## 2022-06-18 RX ADMIN — SODIUM CHLORIDE, PRESERVATIVE FREE 10 ML: 5 INJECTION INTRAVENOUS at 22:27

## 2022-06-18 NOTE — PROGRESS NOTES
SURGERY PROGRESS NOTE      Admit Date: 6/15/2022    POD 3 Days Post-Op    Procedure: Procedure(s):  DIAGNOSTIC LAPAROSCOPY, DISTAL PANCREATECTOMY, SPLENECTOMY      Subjective:     Patient complains of persistent nausea. Worse when standing.   deneis abdominal pain       Objective:     Visit Vitals  BP (!) 156/95   Pulse 100   Temp 99.1 °F (37.3 °C)   Resp 18   Ht 5' 6\" (1.676 m)   Wt 61.2 kg (134 lb 14.7 oz)   SpO2 91%   BMI 21.78 kg/m²        Temp (24hrs), Av.3 °F (36.8 °C), Min:97.6 °F (36.4 °C), Max:99.1 °F (37.3 °C)      701 - 1900  In: -   Out: 40 [Drains:40]  1901 -  0700  In: -   Out: 1455 [Urine:1570; Drains:85]    Physical Exam:    General:  alert, cooperative, no distress, appears stated age   Abdomen: soft, bowel sounds active, non-tender   Incision:   healing well, no drainage, no erythema, no hernia, no seroma, no swelling, no dehiscence, incision well approximated           Lab Results   Component Value Date/Time    WBC 21.5 (H) 2022 03:45 AM    HGB 9.8 (L) 2022 03:45 AM    HCT 30.1 (L) 2022 03:45 AM    PLATELET 961  03:45 AM    MCV 90.9 2022 03:45 AM     Lab Results   Component Value Date/Time    GFR est non-AA >60 2022 03:45 AM    GFRNA, POC >60 2022 07:50 AM    GFR est AA >60 2022 03:45 AM    GFRAA, POC >60 2022 07:50 AM    Creatinine 0.59 2022 03:45 AM    Creatinine (POC) 0.80 2022 07:50 AM    BUN 6 2022 03:45 AM    Sodium 143 2022 03:45 AM    Potassium 3.2 (L) 2022 03:45 AM    Chloride 112 (H) 2022 03:45 AM    CO2 23 2022 03:45 AM    Magnesium 1.9 2022 03:45 AM    Phosphorus 1.4 (L) 2022 03:45 AM       Assessment:     Active Problems:    Pancreas carcinoma (HCC) (6/15/2022)    stable but, has nausea    Plan:     1) add scopolamine patch   2) OOB ambulating

## 2022-06-18 NOTE — PROGRESS NOTES
End of Shift Note    Bedside shift change report given to [place] (oncoming nurse) by Renard Bland RN (offgoing nurse). Report included the following information SBAR, Kardex, ED Summary, Intake/Output, MAR and Recent Results    Shift worked: [id number]     Shift summary and any significant changes:    IV infiltrated, replaced without incident. Urinating well, no issues. No c/o pain or distress. No c/o n/v. Concerns for physician to address:       Zone phone for oncoming shift:          Activity:  Activity Level: Up with Assistance  Number times ambulated in hallways past shift: 0  Number of times OOB to chair past shift: 1    Cardiac:   Cardiac Monitoring: Yes      Cardiac Rhythm: Sinus Rhythm    Access:   Current line(s): PIV     Genitourinary:   Urinary status: straight cath    Respiratory:   O2 Device: Nasal cannula  Chronic home O2 use?: NO  Incentive spirometer at bedside: YES  Actual Volume (ml): 500 ml    GI:  Last Bowel Movement Date: 06/14/22  Current diet:  DIET NPO Ice Chips, Sips of Clear Liquids, Popsicles  Passing flatus: YES  Tolerating current diet: YES       Pain Management:   Patient states pain is manageable on current regimen: YES    Skin:  Saw Score: 18  Interventions: float heels, increase time out of bed, foam dressing and PT/OT consult    Patient Safety:  Fall Score:  Total Score: 3  Interventions: bed/chair alarm, gripper socks, pt to call before getting OOB and stay with me (per policy)  High Fall Risk: Yes    Length of Stay:  Expected LOS: 3d 16h  Actual LOS: 3      Renard Bland RN

## 2022-06-18 NOTE — PROGRESS NOTES
Problem: Falls - Risk of  Goal: *Absence of Falls  Description: Document [place] Fall Risk and appropriate interventions in the flowsheet.   Outcome: Progressing Towards Goal  Note: Fall Risk Interventions:  Mobility Interventions: Patient to call before getting OOB         Medication Interventions: Patient to call before getting OOB    Elimination Interventions: Call light in reach,Patient to call for help with toileting needs

## 2022-06-19 ENCOUNTER — APPOINTMENT (OUTPATIENT)
Dept: GENERAL RADIOLOGY | Age: 72
DRG: 406 | End: 2022-06-19
Attending: SURGERY
Payer: MEDICARE

## 2022-06-19 LAB
GLUCOSE BLD STRIP.AUTO-MCNC: 158 MG/DL (ref 65–117)
GLUCOSE BLD STRIP.AUTO-MCNC: 165 MG/DL (ref 65–117)
GLUCOSE BLD STRIP.AUTO-MCNC: 168 MG/DL (ref 65–117)
GLUCOSE BLD STRIP.AUTO-MCNC: 200 MG/DL (ref 65–117)
GLUCOSE BLD STRIP.AUTO-MCNC: 229 MG/DL (ref 65–117)
SERVICE CMNT-IMP: ABNORMAL

## 2022-06-19 PROCEDURE — 74011250637 HC RX REV CODE- 250/637: Performed by: SURGERY

## 2022-06-19 PROCEDURE — 74011250636 HC RX REV CODE- 250/636: Performed by: SURGERY

## 2022-06-19 PROCEDURE — 74011000250 HC RX REV CODE- 250: Performed by: SURGERY

## 2022-06-19 PROCEDURE — 65270000029 HC RM PRIVATE

## 2022-06-19 PROCEDURE — 74011636637 HC RX REV CODE- 636/637: Performed by: SURGERY

## 2022-06-19 PROCEDURE — 74018 RADEX ABDOMEN 1 VIEW: CPT

## 2022-06-19 PROCEDURE — 94760 N-INVAS EAR/PLS OXIMETRY 1: CPT

## 2022-06-19 PROCEDURE — 82962 GLUCOSE BLOOD TEST: CPT

## 2022-06-19 RX ADMIN — LEVOTHYROXINE SODIUM 137 MCG: 0.11 TABLET ORAL at 06:44

## 2022-06-19 RX ADMIN — ACETAMINOPHEN 325MG 650 MG: 325 TABLET ORAL at 05:27

## 2022-06-19 RX ADMIN — POTASSIUM CHLORIDE 20 MEQ: 20 TABLET, EXTENDED RELEASE ORAL at 18:00

## 2022-06-19 RX ADMIN — Medication 4 UNITS: at 11:44

## 2022-06-19 RX ADMIN — POLYETHYLENE GLYCOL 3350 17 G: 17 POWDER, FOR SOLUTION ORAL at 09:11

## 2022-06-19 RX ADMIN — Medication 4 UNITS: at 06:45

## 2022-06-19 RX ADMIN — ONDANSETRON 4 MG: 2 INJECTION INTRAMUSCULAR; INTRAVENOUS at 10:19

## 2022-06-19 RX ADMIN — Medication 4 UNITS: at 18:29

## 2022-06-19 RX ADMIN — ACETAMINOPHEN 325MG 650 MG: 325 TABLET ORAL at 23:10

## 2022-06-19 RX ADMIN — ACETAMINOPHEN 325MG 650 MG: 325 TABLET ORAL at 00:40

## 2022-06-19 RX ADMIN — POTASSIUM CHLORIDE 20 MEQ: 20 TABLET, EXTENDED RELEASE ORAL at 09:11

## 2022-06-19 RX ADMIN — AMLODIPINE BESYLATE 10 MG: 5 TABLET ORAL at 09:11

## 2022-06-19 RX ADMIN — SODIUM CHLORIDE, PRESERVATIVE FREE 10 ML: 5 INJECTION INTRAVENOUS at 06:00

## 2022-06-19 RX ADMIN — ACETAMINOPHEN 325MG 650 MG: 325 TABLET ORAL at 11:44

## 2022-06-19 RX ADMIN — DEXTROSE MONOHYDRATE, SODIUM CHLORIDE, AND POTASSIUM CHLORIDE: 50; 4.5; 2.98 INJECTION, SOLUTION INTRAVENOUS at 05:32

## 2022-06-19 RX ADMIN — SODIUM CHLORIDE, PRESERVATIVE FREE 10 ML: 5 INJECTION INTRAVENOUS at 13:34

## 2022-06-19 RX ADMIN — ENOXAPARIN SODIUM 40 MG: 100 INJECTION SUBCUTANEOUS at 05:28

## 2022-06-19 RX ADMIN — ACETAMINOPHEN 325MG 650 MG: 325 TABLET ORAL at 18:00

## 2022-06-19 RX ADMIN — SODIUM CHLORIDE, PRESERVATIVE FREE 10 ML: 5 INJECTION INTRAVENOUS at 22:49

## 2022-06-19 RX ADMIN — Medication 6 UNITS: at 00:44

## 2022-06-19 NOTE — PROGRESS NOTES
Problem: Falls - Risk of  Goal: *Absence of Falls  Description: Document Bard  Fall Risk and appropriate interventions in the flowsheet.   Outcome: Progressing Towards Goal  Note: Fall Risk Interventions:  Mobility Interventions: Patient to call before getting OOB         Medication Interventions: Patient to call before getting OOB    Elimination Interventions: Call light in reach

## 2022-06-19 NOTE — PROGRESS NOTES
End of Shift Note    Bedside shift change report given to OLIVE Agustin (oncoming nurse) by Zina Ferguson RN (offgoing nurse). Report included the following information SBAR, Kardex, ED Summary, Intake/Output, MAR and Recent Results    Shift worked: [id number]     Shift summary and any significant changes:    Voiding without issues. C/o nausea, Prn zofran given; nausea patch still in place behind left ear. Teaching given in ref to care of VICTORIANO drain d/t possible d/c with drain. Patient verbalized understanding. Concerns for physician to address: Re evaluate SSI, BS high even with coverage. Zone phone for oncoming shift:  [phone]     Activity:  Activity Level: Up with Assistance  Number times ambulated in hallways past shift: 0  Number of times OOB to chair past shift: 1    Cardiac:   Cardiac Monitoring: Yes      Cardiac Rhythm: Sinus Rhythm    Access:   Current line(s): PIV     Genitourinary:   Urinary status: straight cath    Respiratory:   O2 Device: None (Room air)  Chronic home O2 use?: NO  Incentive spirometer at bedside: YES  Actual Volume (ml): 500 ml    GI:  Last Bowel Movement Date: 06/14/22  Current diet:  ADULT DIET Full Liquid  DIET ONE TIME MESSAGE  Passing flatus: YES  Tolerating current diet: YES       Pain Management:   Patient states pain is manageable on current regimen: YES    Skin:  Saw Score: 20  Interventions: float heels, increase time out of bed, foam dressing and PT/OT consult    Patient Safety:  Fall Score:  Total Score: 3  Interventions: bed/chair alarm, gripper socks, pt to call before getting OOB and stay with me (per policy)  High Fall Risk: Yes    Length of Stay:  Expected LOS: 3d 16h  Actual LOS: 4      Zina Ferguson RN

## 2022-06-19 NOTE — PROGRESS NOTES
SURGERY PROGRESS NOTE      Admit Date: 6/15/2022    POD 4 Days Post-Op    Procedure: Procedure(s):  DIAGNOSTIC LAPAROSCOPY, DISTAL PANCREATECTOMY, SPLENECTOMY      Subjective:     Patient states nausea is better but, not gone. She has no interest in PO intake. She is only taking small sips of water. Denies pain. Passing flatus. Objective:     Visit Vitals  BP (!) 141/69 (BP 1 Location: Left upper arm) Comment: Primary notified    Pulse 87   Temp 98 °F (36.7 °C)   Resp 18   Ht 5' 6\" (1.676 m)   Wt 61.2 kg (134 lb 14.7 oz)   SpO2 93%   BMI 21.78 kg/m²        Temp (24hrs), Av.4 °F (36.9 °C), Min:98 °F (36.7 °C), Max:99.1 °F (37.3 °C)      No intake/output data recorded.    1901 -  0700  In: 2258.8 [I.V.:2258.8]  Out: 1125 [Urine:970; Drains:155]    Physical Exam:    General:  alert, cooperative, no distress, appears stated age   Abdomen: soft, bowel sounds active, non-tender   Incision:   healing well, no drainage, no erythema, no hernia, no seroma, no swelling, no dehiscence, incision well approximated           Lab Results   Component Value Date/Time    WBC 21.5 (H) 2022 03:45 AM    HGB 9.8 (L) 2022 03:45 AM    HCT 30.1 (L) 2022 03:45 AM    PLATELET 008  03:45 AM    MCV 90.9 2022 03:45 AM     Lab Results   Component Value Date/Time    GFR est non-AA >60 2022 03:45 AM    GFRNA, POC >60 2022 07:50 AM    GFR est AA >60 2022 03:45 AM    GFRAA, POC >60 2022 07:50 AM    Creatinine 0.59 2022 03:45 AM    Creatinine (POC) 0.80 2022 07:50 AM    BUN 6 2022 03:45 AM    Sodium 143 2022 03:45 AM    Potassium 3.2 (L) 2022 03:45 AM    Chloride 112 (H) 2022 03:45 AM    CO2 23 2022 03:45 AM    Magnesium 1.9 2022 03:45 AM    Phosphorus 1.4 (L) 2022 03:45 AM       Assessment:     Active Problems:    Pancreas carcinoma ([address] (6/15/2022)    May have an ileus     Plan:     1) KUB  2) Will offer full liquid tray to see if she has more interest in PO

## 2022-06-20 LAB
ANION GAP SERPL CALC-SCNC: 10 MMOL/L (ref 5–15)
BUN SERPL-MCNC: 7 MG/DL (ref 6–20)
BUN/CREAT SERPL: 14 (ref 12–20)
CALCIUM SERPL-MCNC: 8.6 MG/DL (ref 8.5–10.1)
CHLORIDE SERPL-SCNC: 101 MMOL/L (ref 97–108)
CO2 SERPL-SCNC: 23 MMOL/L (ref 21–32)
CREAT SERPL-MCNC: 0.5 MG/DL (ref 0.55–1.02)
ERYTHROCYTE [DISTWIDTH] IN BLOOD BY AUTOMATED COUNT: 12.5 % (ref 11.5–14.5)
GLUCOSE BLD STRIP.AUTO-MCNC: 134 MG/DL (ref 65–117)
GLUCOSE BLD STRIP.AUTO-MCNC: 178 MG/DL (ref 65–117)
GLUCOSE BLD STRIP.AUTO-MCNC: 218 MG/DL (ref 65–117)
GLUCOSE BLD STRIP.AUTO-MCNC: 226 MG/DL (ref 65–117)
GLUCOSE SERPL-MCNC: 267 MG/DL (ref 65–100)
HCT VFR BLD AUTO: 34.2 % (ref 35–47)
HGB BLD-MCNC: 11.3 G/DL (ref 11.5–16)
MCH RBC QN AUTO: 29.9 PG (ref 26–34)
MCHC RBC AUTO-ENTMCNC: 33 G/DL (ref 30–36.5)
MCV RBC AUTO: 90.5 FL (ref 80–99)
NRBC # BLD: 0.14 K/UL (ref 0–0.01)
NRBC BLD-RTO: 0.8 PER 100 WBC
PLATELET # BLD AUTO: 486 K/UL (ref 150–400)
PMV BLD AUTO: 10 FL (ref 8.9–12.9)
POTASSIUM SERPL-SCNC: 4.3 MMOL/L (ref 3.5–5.1)
RBC # BLD AUTO: 3.78 M/UL (ref 3.8–5.2)
SERVICE CMNT-IMP: ABNORMAL
SODIUM SERPL-SCNC: 134 MMOL/L (ref 136–145)
WBC # BLD AUTO: 16.8 K/UL (ref 3.6–11)

## 2022-06-20 PROCEDURE — 85027 COMPLETE CBC AUTOMATED: CPT

## 2022-06-20 PROCEDURE — 80048 BASIC METABOLIC PNL TOTAL CA: CPT

## 2022-06-20 PROCEDURE — 74011000250 HC RX REV CODE- 250: Performed by: SURGERY

## 2022-06-20 PROCEDURE — 74011250637 HC RX REV CODE- 250/637: Performed by: SURGERY

## 2022-06-20 PROCEDURE — 97110 THERAPEUTIC EXERCISES: CPT

## 2022-06-20 PROCEDURE — 74011250636 HC RX REV CODE- 250/636: Performed by: SURGERY

## 2022-06-20 PROCEDURE — 77030027138 HC INCENT SPIROMETER -A

## 2022-06-20 PROCEDURE — 97116 GAIT TRAINING THERAPY: CPT

## 2022-06-20 PROCEDURE — 65270000029 HC RM PRIVATE

## 2022-06-20 PROCEDURE — 74011636637 HC RX REV CODE- 636/637: Performed by: SURGERY

## 2022-06-20 PROCEDURE — 99231 SBSQ HOSP IP/OBS SF/LOW 25: CPT

## 2022-06-20 PROCEDURE — 36415 COLL VENOUS BLD VENIPUNCTURE: CPT

## 2022-06-20 PROCEDURE — 82962 GLUCOSE BLOOD TEST: CPT

## 2022-06-20 PROCEDURE — 76937 US GUIDE VASCULAR ACCESS: CPT

## 2022-06-20 RX ORDER — INSULIN LISPRO 100 [IU]/ML
INJECTION, SOLUTION INTRAVENOUS; SUBCUTANEOUS
Status: DISCONTINUED | OUTPATIENT
Start: 2022-06-20 | End: 2022-06-23 | Stop reason: HOSPADM

## 2022-06-20 RX ORDER — INSULIN GLARGINE 100 [IU]/ML
18 INJECTION, SOLUTION SUBCUTANEOUS DAILY
Status: DISCONTINUED | OUTPATIENT
Start: 2022-06-21 | End: 2022-06-23 | Stop reason: HOSPADM

## 2022-06-20 RX ADMIN — POLYETHYLENE GLYCOL 3350 17 G: 17 POWDER, FOR SOLUTION ORAL at 08:59

## 2022-06-20 RX ADMIN — HYDROCODONE BITARTRATE AND ACETAMINOPHEN 1 TABLET: 5; 325 TABLET ORAL at 22:46

## 2022-06-20 RX ADMIN — POTASSIUM CHLORIDE 20 MEQ: 20 TABLET, EXTENDED RELEASE ORAL at 17:00

## 2022-06-20 RX ADMIN — DEXTROSE MONOHYDRATE, SODIUM CHLORIDE, AND POTASSIUM CHLORIDE: 50; 4.5; 2.98 INJECTION, SOLUTION INTRAVENOUS at 13:26

## 2022-06-20 RX ADMIN — POTASSIUM CHLORIDE 20 MEQ: 20 TABLET, EXTENDED RELEASE ORAL at 08:59

## 2022-06-20 RX ADMIN — Medication 6 UNITS: at 08:59

## 2022-06-20 RX ADMIN — ACETAMINOPHEN 325MG 650 MG: 325 TABLET ORAL at 17:00

## 2022-06-20 RX ADMIN — SODIUM CHLORIDE, PRESERVATIVE FREE 10 ML: 5 INJECTION INTRAVENOUS at 21:39

## 2022-06-20 RX ADMIN — HYDROCODONE BITARTRATE AND ACETAMINOPHEN 1 TABLET: 5; 325 TABLET ORAL at 09:03

## 2022-06-20 RX ADMIN — LEVOTHYROXINE SODIUM 137 MCG: 0.11 TABLET ORAL at 08:59

## 2022-06-20 RX ADMIN — ENOXAPARIN SODIUM 40 MG: 100 INJECTION SUBCUTANEOUS at 05:28

## 2022-06-20 RX ADMIN — SODIUM CHLORIDE, PRESERVATIVE FREE 10 ML: 5 INJECTION INTRAVENOUS at 13:29

## 2022-06-20 RX ADMIN — ACETAMINOPHEN 325MG 650 MG: 325 TABLET ORAL at 11:29

## 2022-06-20 RX ADMIN — Medication 6 UNITS: at 11:31

## 2022-06-20 RX ADMIN — ACETAMINOPHEN 325MG 650 MG: 325 TABLET ORAL at 05:28

## 2022-06-20 RX ADMIN — Medication 2 UNITS: at 16:53

## 2022-06-20 RX ADMIN — AMLODIPINE BESYLATE 10 MG: 5 TABLET ORAL at 08:59

## 2022-06-20 NOTE — PROGRESS NOTES
Transition of Care Plan:     RUR:10%  Disposition:Home w/family-pt declined   Follow up appointments:  DME needed:r/w-Deal  Transportation at [place] [id number] [facility] or means to access home:         Medicare Letter:2nd IM needed  Is patient a BCPI-A Bundle:   n/a                   If yes, was Bundle Letter given?:    Is patient a [place] and connected with the [place]? If yes, was [employer] transfer form completed and [facility] notified? Caregiver Contact:Colt hutotn [phone]  Discharge Caregiver contacted prior to discharge? Care Conference needed?:         CM delivered r/w to pt. CM will continue to follow.     Aida Manuel  Ext [phone] Abormal VS: Temp > 100F or < 96.8F; SBP < 90 mmHG; HR > 120bpm; Resp > 24/min

## 2022-06-20 NOTE — PROGRESS NOTES
Attending provider:  Mundo Rashid MD   PCP:  Nidhi Singleton MD    Subjective:    Patient seen and examined by me today. Doing well. BM's. A little nausea at times. Has not walked. Objective:    Blood pressure (!) 151/80, pulse 81, temperature 97.9 °F (36.6 °C), resp. rate 16, height 5' 6\" (1.676 m), weight 61.2 kg (134 lb 14.7 oz), SpO2 95 %. Drains - SS  Exam - A&O, NAD.  CTAB, RRR. Abdomen soft, ND, hypoactive BS. Appropriately TTP. Dressing CDI. No edema. Assessment:  Procedure(s):  DIAGNOSTIC LAPAROSCOPY, DISTAL PANCREATECTOMY, SPLENECTOMY 6/15/2022    Active Hospital Problems    Diagnosis Date Noted    Pancreas carcinoma ([facility] 06/15/2022     - acute leukocytosis secondary to splenectomy. - acute DM secondary to pancreatectomy. Plan:  - try reg diet. - DVT prophylaxis - Lovenox. AMBULATE. - Adding long-acting insulin.

## 2022-06-20 NOTE — PROGRESS NOTES
Occupational Therapy    OT order to cancel OT has been acknowledged. Patient was evaluated by OT and deemed in need of OT services on 6/17. Please reorder OT if the cancellation of OT orders was done in error.     Zane Escalona, OTR/L

## 2022-06-20 NOTE — PROGRESS NOTES
End of Shift Note    Bedside shift change report given to OLIVE Anthony (oncoming nurse) by Patricia Wallace (offgoing nurse). Report included the following information SBAR, Kardex, Procedure Summary, Intake/Output and MAR    Shift worked:  7a-7p     Shift summary and any significant changes:     Patient up for meals this shift, ambulated in halls x 1. Will continue to encourage activity. Patient tolerating current diet. Concerns for physician to address:       Zone phone for oncoming shift:          Activity:  Activity Level: Up with Assistance  Number times ambulated in hallways past shift: 1  Number of times OOB to chair past shift: 3    Cardiac:   Cardiac Monitoring: No      Cardiac Rhythm: Sinus Rhythm    Access:   Current line(s): PIV     Genitourinary:   Urinary status: voiding    Respiratory:   O2 Device: None (Room air)  Chronic home O2 use?: NO  Incentive spirometer at bedside: YES  Actual Volume (ml): 500 ml    GI:  Last Bowel Movement Date: 06/14/22  Current diet:  ADULT DIET Full Liquid; 3 carb choices (45 gm/meal); Low Fat/Low Chol/High Fiber/2 gm Na  Passing flatus: YES  Tolerating current diet: YES       Pain Management:   Patient states pain is manageable on current regimen: YES    Skin:  Saw Score: 20  Interventions: increase time out of bed    Patient Safety:  Fall Score:  Total Score: 3  Interventions: gripper socks and pt to call before getting OOB  High Fall Risk: Yes    Length of Stay:  Expected LOS: 3d 16h  Actual LOS: 2016 [facility]

## 2022-06-20 NOTE — PROGRESS NOTES
Endurance Catheter insertion note     Inserted 20 G, 6 cm long  Endurance Extended Dwell Peripheral Catheter into left forearm using ultrasound guidance and sterile technique. Positive blood return. Patient tolerated procedure well. Denies questions or concerns at this time. The Endurance catheter is an extended dwell peripheral catheter and may remain in place 29 days. Blood samples can be obtained from this catheter. To obtain labs, a tourniquet may be used, flush with 10ml NS, waste 3ml, draw labs, flush with 20ml NS. Dressings needs to be changed with central line dressing kit using bio patch and stat lock every 7 days by nurse. Primary nurse, Halima Sandoval RN notified. Jeremy Corbin RN .   Vascular Access Team. PICC Nurse

## 2022-06-20 NOTE — DIABETES MGMT
[address] [facility]    CLINICAL NURSE SPECIALIST CONSULT     Initial Presentation   Cathleen Fuller is a 66 y.o. female admitted 6/15/22 for surgical intervention. Prior to admission, seen by Dr. Tamra Gutierrez and diagnosed with pancreatic cancer; obtained cardiac clearance via Dr. Deidra Perez. 11/5/21 MRI ABd: Pancreas: Mass in the body the pancreas measuring 17 x 17 mm with distal pancreatic atrophy and ductal dilatation as well as side branch ectasia. The lesion is relatively hypointense on T2-weighted images  2/1/22 CT ABd: Hypoenhancing mass in the pancreatic neck persists, measuring approximately 2.6 x 2.2 cm (image 41) without significant change since MRI allowing for differences in imaging modality. There is upstream pancreatic duct dilation with two foci of side branch ectasia of approximately 1 cm size each. There is generalized pancreatic atrophy. Regional vessels are uninvolved. Regional nodes are stable, largest measuring 1.3 cm (image 69).  5/5/22 CT Pelvis: PANCREAS: Dilatation of the pancreatic duct. 2.8 x 3.0 x 2.2 cm mass in the body of the pancreas previously measured 2.4 x 2.6 x 2.3 cm. There are multiple peripancreatic lymph nodes. There are 2 lymph nodes on image 52. One of the lymph nodes is seen anterior to the pancreatic body measures 7.1 mm. The second lymph node is seen posterior to the SMV and measures 10 mm. This previously measured 8 mm. There are several smaller lymph nodes adjacent to a right SMA branch on image 55. There is associated side branch ectasia. A lymph node in the right retroperitoneum measures 10 mm and is stable. A lymph node on coronal image 63 measures 11 mm and is stable.     HX:   Past Medical History:   Diagnosis Date    Arthritis     fingers    CAD (coronary artery disease)     Cancer ([address] 2013    melanoma - left forearm    Cancer ([address] 2022    pancreatic    Carotid artery disease ([address]     Diabetes ([address]     Type 2    Hypercholesterolemia     Hypertension     Long term current use of anticoagulant therapy     ASA    Thyroid disease     thyroidectomy      INITIAL DX:   Pancreas carcinoma ([facility] [address] [C25.9]     Current Treatment     TX: 6/15/22 DIAGNOSTIC LAPAROSCOPY, DISTAL PANCREATECTOMY, SPLENECTOMY   BP managemetn. Clot prevention. Insulin. Synthroid    Consulted by Provider for advanced diabetes nursing assessment and care for:   [] Transitioning off Patrecia Muck   [x] Inpatient management strategy  [] Home management assessment  [x] Survival skill education    Hospital Course   Clinical progress has been complicated by type of surgery. 6/16/22 Alert & oriented. Reporting nausea with NG in place; given Zofran. Pain control via Dilaudid PCA   6/17/22 Looks and feels better. Using O2NC. Intermittent nausea persists. Diabetes History   Type 2 diabetes on oral agents  Family history of diabetes in maternal aunt  No current A1c    Diabetes-related Medical History  Macrovascular disease  CAD    Diabetes Medication History  Key Antihyperglycemic Medications             metFORMIN (GLUCOPHAGE) 500 mg tablet (Taking) Take 1,000 mg by mouth two (2) times daily (with meals). SITagliptin (JANUVIA) 100 mg tablet Take 100 mg by mouth daily (with lunch). Subjective   I live alone     Objective   Physical exam  General Thin female who is in no acute distress. Conversant & cooperative  Neuro  Alert, oriented   Vital Signs   Visit Vitals  BP (!) 151/80   Pulse 81   Temp 97.9 °F (36.6 °C)   Resp 16   Ht 5' 6\" (1.676 m)   Wt 61.2 kg (134 lb 14.7 oz)   SpO2 95%   BMI 21.78 kg/m²     Laboratory  Recent Labs     06/20/22  0946 06/18/22  0345   * 179*   AGAP 10 8   WBC 16.8* 21.5*   CREA 0.50* 0.59   GFRNA >60 >60     Factors impacting BG management  Factor Dose Comments   Nutrition:   45 gm carb meal    Infection Cefoxitin (completed)  Afebrile.  Elevated WBC   Other:   Kidney function   Normal      Blood glucose pattern      Significant diabetes-related events over the past 24-72 hours  Admission . GFR >60  6/15/22 DISTAL PANCREATECTOMY  On corrective insulin => used 28 units of total insulin post-operatively to bring BG to target  6/17/22 Used 14 units of corrective insulin yesterday    Assessment and Plan   Nursing Diagnosis Risk for unstable blood glucose pattern   Nursing Intervention Domain 5259 Decision-making Support   Nursing Interventions Examined current inpatient diabetes/blood glucose control   Explored factors facilitating and impeding inpatient management  Explored corrective strategies with patient and responsible inpatient provider   Informed patient of rational for insulin strategy while hospitalized     Evaluation   This thin  female was admitted for surgical intervention and underwent removal of pancreatic mass. Consulted by surgeon to address BG management. Patient was treated for Type 2 diabetes with oral agents PTA. Patient was nauseous yesterday. According to OP notes, patient had a gap in primary care so I'm curious whether she had up-to-date prescriptions. Upon admission, BG elevated in the 200s so her pancreatic function was questionable before the surgery and will likely diminish as a result of the amount of pancreatic beta cell tissue that was removed. It took 28 units of insulin after surgery to bring BGs to target. Used 14 units of corrective insulin yesterday 6/16/22. Again, would recommend starting a basal dose of insulin. Some info copied from Macey Villalba, CNS note    6/20/22 BG's elevated today. The patient is using correction insulin only. I recommend a scheduled basal insulin dose. She used a total of 18 units correction insulin yesterday. Consider 0.3 x kg ( 18 units Lantus daily). Recommendations     1. Start 18 units ( 0.3 x kg) daily to start in the morning.      Billing Code(s)   [] [id number]  subsequent hospital care - 35 minutes [] [id number] Prolonged Services - 72 minutes [] [id number] Prolonged Services - 110 minutes  [x] 02061 IP subsequent hospital care - 25 minutes [] [id number] Prolonged Services - 55 minutes [] 57823 Prolonged Services - 100 minutes  [x] 85900 IP subsequent hospital care - 15 minutes [] [id number] Prolonged Services - 45 minutes [] [id number] Prolonged Services - 90 minutes    Before making these care recommendations, I personally reviewed the hospitalization record, including notes, laboratory & diagnostic data and current medications, and examined the patient at the bedside (circumstances permitting) before making care recommendations. More than fifty (50) percent of the time was spent in patient counseling and/or care coordination.   Total minutes: 75 GASPER Boss  Diabetes Clinical Nurse Specialist  [facility]  Access via [employer]

## 2022-06-20 NOTE — PROGRESS NOTES
Problem: Mobility Impaired (Adult and Pediatric)  Goal: *Acute Goals and Plan of Care (Insert Text)  Description: FUNCTIONAL STATUS PRIOR TO ADMISSION: Patient was independent and active without use of DME.    HOME SUPPORT PRIOR TO ADMISSION: The patient lived alone with Brother to provide assistance as needed. Physical Therapy Goals  Initiated 6/17/2022  1. Patient will move from supine to sit and sit to supine , scoot up and down, and roll side to side in bed with modified independence within 7 day(s). 2.  Patient will transfer from bed to chair and chair to bed with modified independence using the least restrictive device within 7 day(s). 3.  Patient will perform sit to stand with modified independence within 7 day(s). 4.  Patient will ambulate with modified independence for 150 feet with the least restrictive device within 7 day(s). Outcome: Progressing Towards Goal   PHYSICAL THERAPY TREATMENT  Patient: Evan Bland (66 y.o. female)  Date: 6/20/2022  Diagnosis: Pancreas carcinoma ([facility] [C25.9] <principal problem not specified>  Procedure(s) (LRB):  DIAGNOSTIC LAPAROSCOPY, DISTAL PANCREATECTOMY, SPLENECTOMY (N/A) 5 Days Post-Op  Precautions:    Chart, physical therapy assessment, plan of care and goals were reviewed. ASSESSMENT  Patient continues with skilled PT services and is progressing towards goals. Pt presents with decreased strength and endurance. Pt performed bed mobility at supervision. Pt performed sit to to stand transfer at [facility]/SBA with cueing for hand placement. Pt ambulated 40ft x2 with RW at [facility]. pt initially unsteady but improved with time. Pts o2 stats at 89-91% after ambulation. Pt with some UE edema and educated on hand exercises to improve. Pt with improved mobility tolerance and safety. Pt educated on also performing incentive spirometer to improve breathing. Pt with understanding.        Current Level of Function Impacting Discharge (mobility/balance):  Bed mobility at supervision, sit to stand transfer at [facility]/SBA, ambulation at [facility] with RW    Other factors to consider for discharge: decreased strength and endurance. PLAN :  Patient continues to benefit from skilled intervention to address the above impairments. Continue treatment per established plan of care. to address goals. Recommendation for discharge: (in order for the patient to meet his/her long term goals)  Physical therapy at least 2 days/week in the home AND ensure assist and/or supervision for safety with mobility     This discharge recommendation:  Has been made in collaboration with the attending provider and/or case management    IF patient discharges home will need the following DME: rolling walker       SUBJECTIVE:   Patient stated  I guess I should get up.     OBJECTIVE DATA SUMMARY:   Critical Behavior:  Neurologic State: Alert,Appropriate for age  Orientation Level: Oriented X4  Cognition: Appropriate for age attention/concentration,Appropriate safety awareness,Appropriate decision making,Follows commands  Safety/Judgement: Fall prevention  Functional Mobility Training:  Bed Mobility:  Rolling: Supervision  Supine to Sit: Supervision     Scooting: Supervision        Transfers:  Sit to Stand: Contact guard assistance;Stand-by assistance  Stand to Sit: Stand-by assistance        Bed to Chair: Contact guard assistance;Stand-by assistance                    Balance:  Sitting: Intact  Standing: Impaired  Standing - Static: Good;Constant support  Standing - Dynamic : Fair;Constant support  Ambulation/Gait Training:  Distance (ft): 80 Feet (ft) (40ft x2)  Assistive Device: Gait belt;Walker, rolling  Ambulation - Level of Assistance: Contact guard assistance;Stand-by assistance        Gait Abnormalities: Decreased step clearance        Base of Support: Widened     Speed/Missy: Pace decreased (<100 feet/min)  Step Length: Left shortened;Right shortened             Therapeutic Exercises:   Shoulder flexion x5  Hand squeezes x10  Bicep curls x5    Pain Rating:  Pt with no complaints of pain    Activity Tolerance:   Good and SpO2 stable on RA    After treatment patient left in no apparent distress:   Sitting in chair and Call bell within reach    COMMUNICATION/COLLABORATION:   The patients plan of care was discussed with: Registered nurse.      Teresita Mathews PTA   Time Calculation: 23 mins

## 2022-06-20 NOTE — PROGRESS NOTES
End of Shift Note    Bedside shift change report given to Rodney Meyer LPN (oncoming nurse) by Isma Church RN (offgoing nurse). Report included the following information SBAR, Kardex, Procedure Summary, Intake/Output, MAR and Recent Results    Shift worked: [id number]     Shift summary and any significant changes:    Patient up to [facility] multiple times to void, tolerated well. No N/V reported this shift. IV infiltrated. Resource chain followed with no success, PICC team called, voicemail left. Day shift nurse aware.      Concerns for physician to address:      Zone phone for oncoming shift:           Isma Church, RN

## 2022-06-20 NOTE — PROGRESS NOTES
Problem: Falls - Risk of  Goal: *Absence of Falls  Description: Document Mae Hensley Fall Risk and appropriate interventions in the flowsheet. Outcome: Progressing Towards Goal  Note: Fall Risk Interventions:  Mobility Interventions: Patient to call before getting OOB         Medication Interventions: Teach patient to arise slowly    Elimination Interventions: Call light in reach              Problem: Pressure Injury - Risk of  Goal: *Prevention of pressure injury  Description: Document Saw Scale and appropriate interventions in the flowsheet. Outcome: Progressing Towards Goal  Note: Pressure Injury Interventions:  Sensory Interventions: Assess changes in LOC,Float heels,Minimize linen layers,Sit a 90-degree angle/use footstool if needed,Pressure redistribution bed/mattress (bed type),Turn and reposition approx.  every two hours (pillows and wedges if needed)         Activity Interventions: Increase time out of bed    Mobility Interventions: HOB 30 degrees or less    Nutrition Interventions: Document food/fluid/supplement intake

## 2022-06-21 LAB
COMMENT, HOLDF: NORMAL
GLUCOSE BLD STRIP.AUTO-MCNC: 132 MG/DL (ref 65–117)
GLUCOSE BLD STRIP.AUTO-MCNC: 142 MG/DL (ref 65–117)
GLUCOSE BLD STRIP.AUTO-MCNC: 173 MG/DL (ref 65–117)
GLUCOSE BLD STRIP.AUTO-MCNC: 289 MG/DL (ref 65–117)
GLUCOSE BLD STRIP.AUTO-MCNC: 72 MG/DL (ref 65–117)
SAMPLES BEING HELD,HOLD: NORMAL
SERVICE CMNT-IMP: ABNORMAL
SERVICE CMNT-IMP: NORMAL

## 2022-06-21 PROCEDURE — 65270000029 HC RM PRIVATE

## 2022-06-21 PROCEDURE — 97116 GAIT TRAINING THERAPY: CPT | Performed by: PHYSICAL THERAPIST

## 2022-06-21 PROCEDURE — 74011250636 HC RX REV CODE- 250/636: Performed by: SURGERY

## 2022-06-21 PROCEDURE — 74011250637 HC RX REV CODE- 250/637: Performed by: SURGERY

## 2022-06-21 PROCEDURE — 74011000250 HC RX REV CODE- 250: Performed by: SURGERY

## 2022-06-21 PROCEDURE — 83690 ASSAY OF LIPASE: CPT

## 2022-06-21 PROCEDURE — 74011636637 HC RX REV CODE- 636/637: Performed by: SURGERY

## 2022-06-21 PROCEDURE — 99233 SBSQ HOSP IP/OBS HIGH 50: CPT

## 2022-06-21 PROCEDURE — 82962 GLUCOSE BLOOD TEST: CPT

## 2022-06-21 RX ORDER — PANTOPRAZOLE SODIUM 40 MG/1
40 TABLET, DELAYED RELEASE ORAL
Status: DISCONTINUED | OUTPATIENT
Start: 2022-06-21 | End: 2022-06-23 | Stop reason: HOSPADM

## 2022-06-21 RX ORDER — ACETAMINOPHEN 325 MG/1
650 TABLET ORAL
Status: DISCONTINUED | OUTPATIENT
Start: 2022-06-21 | End: 2022-06-23 | Stop reason: HOSPADM

## 2022-06-21 RX ADMIN — ENOXAPARIN SODIUM 40 MG: 100 INJECTION SUBCUTANEOUS at 05:30

## 2022-06-21 RX ADMIN — SODIUM CHLORIDE, PRESERVATIVE FREE 10 ML: 5 INJECTION INTRAVENOUS at 05:31

## 2022-06-21 RX ADMIN — Medication 6 UNITS: at 08:05

## 2022-06-21 RX ADMIN — PANTOPRAZOLE SODIUM 40 MG: 40 TABLET, DELAYED RELEASE ORAL at 17:26

## 2022-06-21 RX ADMIN — SODIUM CHLORIDE, PRESERVATIVE FREE 10 ML: 5 INJECTION INTRAVENOUS at 15:08

## 2022-06-21 RX ADMIN — DEXTROSE MONOHYDRATE, SODIUM CHLORIDE, AND POTASSIUM CHLORIDE: 50; 4.5; 2.98 INJECTION, SOLUTION INTRAVENOUS at 12:21

## 2022-06-21 RX ADMIN — AMLODIPINE BESYLATE 10 MG: 5 TABLET ORAL at 08:05

## 2022-06-21 RX ADMIN — POTASSIUM CHLORIDE 20 MEQ: 20 TABLET, EXTENDED RELEASE ORAL at 17:26

## 2022-06-21 RX ADMIN — Medication 2 UNITS: at 12:24

## 2022-06-21 RX ADMIN — LEVOTHYROXINE SODIUM 137 MCG: 0.11 TABLET ORAL at 05:30

## 2022-06-21 RX ADMIN — INSULIN GLARGINE 18 UNITS: 100 INJECTION, SOLUTION SUBCUTANEOUS at 08:06

## 2022-06-21 RX ADMIN — POLYETHYLENE GLYCOL 3350 17 G: 17 POWDER, FOR SOLUTION ORAL at 08:05

## 2022-06-21 RX ADMIN — ACETAMINOPHEN 325MG 650 MG: 325 TABLET ORAL at 05:30

## 2022-06-21 RX ADMIN — SODIUM CHLORIDE, PRESERVATIVE FREE 10 ML: 5 INJECTION INTRAVENOUS at 21:17

## 2022-06-21 RX ADMIN — POTASSIUM CHLORIDE 20 MEQ: 20 TABLET, EXTENDED RELEASE ORAL at 08:05

## 2022-06-21 RX ADMIN — ACETAMINOPHEN 325MG 650 MG: 325 TABLET ORAL at 12:24

## 2022-06-21 NOTE — PROGRESS NOTES
Problem: Mobility Impaired (Adult and Pediatric)  Goal: *Acute Goals and Plan of Care (Insert Text)  Description: FUNCTIONAL STATUS PRIOR TO ADMISSION: Patient was independent and active without use of DME.    HOME SUPPORT PRIOR TO ADMISSION: The patient lived alone with Brother to provide assistance as needed. Physical Therapy Goals  Initiated 6/17/2022  1. Patient will move from supine to sit and sit to supine , scoot up and down, and roll side to side in bed with modified independence within 7 day(s). 2.  Patient will transfer from bed to chair and chair to bed with modified independence using the least restrictive device within 7 day(s). 3.  Patient will perform sit to stand with modified independence within 7 day(s). 4.  Patient will ambulate with modified independence for 150 feet with the least restrictive device within 7 day(s). Outcome: Progressing Towards Goal   PHYSICAL THERAPY TREATMENT  Patient: Sanders Goldmann (66 y.o. female)  Date: 6/21/2022  Diagnosis: Pancreas carcinoma ([facility] [C25.9] <principal problem not specified>  Procedure(s) (LRB):  DIAGNOSTIC LAPAROSCOPY, DISTAL PANCREATECTOMY, SPLENECTOMY (N/A) 6 Days Post-Op  Precautions: Fall  Chart, physical therapy assessment, plan of care and goals were reviewed. ASSESSMENT  Patient continues with skilled PT services and is progressing towards goals. Patient overall limited by pain, gait instability, mild balance impairment and decreased activity tolerance. Patient is pleasant, motivated and compliant. Needing SBA for bed mobility and transfers. Able to increase gait distance x 140 feet with Rw and CGA with no overt LOB. Overall progressing well toward goals. Other factors to consider for discharge: at risk for falls, below baseline         PLAN :  Patient continues to benefit from skilled intervention to address the above impairments. Continue treatment per established plan of care. to address goals.     Recommendation for discharge: (in order for the patient to meet his/her long term goals)  Physical therapy at least 2 days/week in the home         IF patient discharges home will need the following DME: patient owns DME required for discharge       SUBJECTIVE:   Patient stated I am glad to get up and walk.     OBJECTIVE DATA SUMMARY:   Critical Behavior:  Neurologic State: Alert,Appropriate for age  Orientation Level: Oriented X4  Cognition: Appropriate for age attention/concentration,Appropriate safety awareness,Appropriate decision making,Follows commands  Safety/Judgement: Fall prevention  Functional Mobility Training:  Bed Mobility:  Rolling: Supervision  Supine to Sit: Supervision     Scooting: Supervision        Transfers:  Sit to Stand: Contact guard assistance  Stand to Sit: Stand-by assistance                             Balance:  Sitting: Intact  Standing: Impaired  Standing - Static: Constant support;Good (With Rw)  Standing - Dynamic : Constant support;Good (with RW)  Ambulation/Gait Training:  Distance (ft): 140 Feet (ft)  Assistive Device: Gait belt;Walker, rolling  Ambulation - Level of Assistance: Contact guard assistance;Assist x1        Gait Abnormalities: Decreased step clearance;Shuffling gait        Base of Support: Widened     Speed/Missy: Pace decreased (<100 feet/min); Shuffled; Slow  Step Length: Left shortened;Right shortened    Pain Rating:  Reports some abdominal pain but does not rate    Activity Tolerance:   Fair and requires rest breaks    After treatment patient left in no apparent distress:   Supine in bed and Call bell within reach    COMMUNICATION/COLLABORATION:   The patients plan of care was discussed with: Physical therapist, Occupational therapist, and Registered nurse.      Kristal Wilson, PT, DPT   Time Calculation: 16 mins

## 2022-06-21 NOTE — DIABETES MGMT
[facility]    CLINICAL NURSE SPECIALIST CONSULT     Initial Presentation   Uma Eisenberg is a 66 y.o. female admitted 6/15/22 for surgical intervention. Prior to admission, seen by Dr. Elder Beltran and diagnosed with pancreatic cancer; obtained cardiac clearance via Dr. Yinka Mackenzie. 11/5/21 MRI ABd: Pancreas: Mass in the body the pancreas measuring 17 x 17 mm with distal pancreatic atrophy and ductal dilatation as well as side branch ectasia. The lesion is relatively hypointense on T2-weighted images  2/1/22 CT ABd: Hypoenhancing mass in the pancreatic neck persists, measuring approximately 2.6 x 2.2 cm (image 41) without significant change since MRI allowing for differences in imaging modality. There is upstream pancreatic duct dilation with two foci of side branch ectasia of approximately 1 cm size each. There is generalized pancreatic atrophy. Regional vessels are uninvolved. Regional nodes are stable, largest measuring 1.3 cm (image 69).  5/5/22 CT Pelvis: PANCREAS: Dilatation of the pancreatic duct. 2.8 x 3.0 x 2.2 cm mass in the body of the pancreas previously measured 2.4 x 2.6 x 2.3 cm. There are multiple peripancreatic lymph nodes. There are 2 lymph nodes on image 52. One of the lymph nodes is seen anterior to the pancreatic body measures 7.1 mm. The second lymph node is seen posterior to the SMV and measures 10 mm. This previously measured 8 mm. There are several smaller lymph nodes adjacent to a right SMA branch on image 55. There is associated side branch ectasia. A lymph node in the right retroperitoneum measures 10 mm and is stable. A lymph node on coronal image 63 measures 11 mm and is stable.     HX:   Past Medical History:   Diagnosis Date    Arthritis     fingers    CAD (coronary artery disease)     Cancer ([address] 2013    melanoma - left forearm    Cancer ([address] 2022    pancreatic    Carotid artery disease ([address]     Diabetes ([address]     Type 2    Hypercholesterolemia     Hypertension     Long term current use of anticoagulant therapy     ASA    Thyroid disease     thyroidectomy      INITIAL DX:   Pancreas carcinoma ([facility] [C25.9]     Current Treatment     TX: 6/15/22 DIAGNOSTIC LAPAROSCOPY, DISTAL PANCREATECTOMY, SPLENECTOMY   BP managemetn. Clot prevention. Insulin. Synthroid    Consulted by Provider for advanced diabetes nursing assessment and care for:   [] Transitioning off Levora Battles   [x] Inpatient management strategy  [] Home management assessment  [x] Survival skill education    Hospital Course   Clinical progress has been complicated by type of surgery. 6/16/22 Alert & oriented. Reporting nausea with NG in place; given Zofran. Pain control via Dilaudid PCA   6/17/22 Looks and feels better. Using O2NC. Intermittent nausea persists. Diabetes History   Type 2 diabetes on oral agents. Family history of diabetes in maternal aunt. Diabetes-related Medical History  Macrovascular disease  CAD    Diabetes Medication History  Key Antihyperglycemic Medications             metFORMIN (GLUCOPHAGE) 500 mg tablet (Taking) Take 1,000 mg by mouth two (2) times daily (with meals). SITagliptin (JANUVIA) 100 mg tablet Take 100 mg by mouth daily (with lunch). Subjective   It's a lot to take in     Objective   Physical exam  General Thin female who is in no acute distress. Conversant & cooperative  Neuro  Alert, oriented   Vital Signs   Visit Vitals  /67   Pulse 89   Temp 98.7 °F (37.1 °C)   Resp 18   Ht 5' 6\" (1.676 m)   Wt 61.2 kg (134 lb 14.7 oz)   SpO2 96%   BMI 21.78 kg/m²     Laboratory  Recent Labs     06/20/22  0946   *   AGAP 10   WBC 16.8*   CREA 0.50*   GFRNA >60     Factors impacting BG management  Factor Dose Comments   Nutrition:   45 gm carb meal    Infection Cefoxitin (completed)  Afebrile.  Elevated WBC   Other:   Kidney function   Normal      Blood glucose pattern      Significant diabetes-related events over the past 24-72 hours  Admission . GFR >60  6/15/22 DISTAL PANCREATECTOMY  On corrective insulin => used 28 units of total insulin post-operatively to bring BG to target  6/17/22 Used 14 units of corrective insulin yesterday    Assessment and Plan   Nursing Diagnosis Risk for unstable blood glucose pattern   Nursing Intervention Domain 7049 Decision-making Support   Nursing Interventions Examined current inpatient diabetes/blood glucose control   Explored factors facilitating and impeding inpatient management  Explored corrective strategies with patient and responsible inpatient provider   Informed patient of rational for insulin strategy while hospitalized     Evaluation   This thin  female was admitted for surgical intervention and underwent removal of pancreatic mass. Consulted by surgeon to address BG management. Patient was treated for Type 2 diabetes with oral agents PTA. Patient was nauseous yesterday. According to OP notes, patient had a gap in primary care so I'm curious whether she had up-to-date prescriptions. Upon admission, BG elevated in the 200s so her pancreatic function was questionable before the surgery and will likely diminish as a result of the amount of pancreatic beta cell tissue that was removed. It took 28 units of insulin after surgery to bring BGs to target. Used 14 units of corrective insulin yesterday 6/16/22. Again, would recommend starting a basal dose of insulin. Some info copied from Henrique Hunter, CNS note    6/20/22 BG's elevated today. The patient is using correction insulin only. I recommend a scheduled basal insulin dose. She used a total of 18 units correction insulin yesterday. Consider 0.3 x kg ( 18 units Lantus daily). 6/21/22  The morning BG was above goal this more. However, the patient was started on on scheduled basal dose of 18 units Lantus today. Diabetes management will continue monitor BG trends and make recommendations as needed.  Insulin pen administration demonstration provided. The patient was proficient. The patient was able to verbalize the steps on glucometer use. Hyperglycemia, hypoglycemia and sick day management discussed. The patient verbalizes understanding. ADA booklet provided. The patient's brother was also present. Recommendations     1. Continue 18 units ( 0.3 x kg) daily to start in the morning. Billing Code(s)   [x] [id number] IP subsequent hospital care - 35 minutes [] 69673 Prolonged Services - 65 minutes [] 43554 Prolonged Services - 110 minutes  [] 84130 IP subsequent hospital care - 25 minutes [] 61863 Prolonged Services - 55 minutes [] 53615 Prolonged Services - 100 minutes  [] 86654 IP subsequent hospital care - 15 minutes [] 77907 Prolonged Services - 45 minutes [] 48917 Prolonged Services - 90 minutes    Before making these care recommendations, I personally reviewed the hospitalization record, including notes, laboratory & diagnostic data and current medications, and examined the patient at the bedside (circumstances permitting) before making care recommendations. More than fifty (50) percent of the time was spent in patient counseling and/or care coordination.   Total minutes: 175 Francisco Fenton, CNS  Diabetes Clinical Nurse Specialist  [facility]  Access via [facility]

## 2022-06-21 NOTE — PROGRESS NOTES
Attending provider:  Carol South MD   PCP:  Severino Mehta MD    Subjective:    Patient seen and examined by me today. Doing well. BM's. Still a little nausea at times. Walked today. Objective:    Blood pressure (!) 152/83, pulse 81, temperature 98.9 °F (37.2 °C), resp. rate 18, height 5' 6\" (1.676 m), weight 61.2 kg (134 lb 14.7 oz), SpO2 96 %. Drains - SS  Exam - A&O, NAD.  CTAB, RRR. Abdomen soft, ND, active BS. Appropriately TTP. Dressing CDI. No edema. Assessment:  Procedure(s):  DIAGNOSTIC LAPAROSCOPY, DISTAL PANCREATECTOMY, SPLENECTOMY 6/15/2022    Active Hospital Problems    Diagnosis Date Noted    Pancreas carcinoma ([facility] 06/15/2022     - acute leukocytosis secondary to splenectomy coming down. Platelets rising.    - acute DM secondary to pancreatectomy. Plan:  - reg diet. - VICTORIANO drain for lipase. - DVT prophylaxis - Lovenox. AMBULATE. - may need to adjust long-acting insulin.

## 2022-06-21 NOTE — OP NOTES
[address]  OPERATIVE REPORT    Name:  Anamaria Casper  MR#:  [id number]  :  1950  ACCOUNT #:  [de-identified]  DATE OF SERVICE:  06/15/2022    PREOPERATIVE DIAGNOSIS:  Pancreatic adenocarcinoma. POSTOPERATIVE DIAGNOSIS:  Pancreatic adenocarcinoma. PROCEDURE PERFORMED:  Diagnostic laparoscopy followed by open distal pancreatectomy and splenectomy, takedown of splenic flexure. SURGEON:  Denise Emmanuel MD    ASSISTANT:  Staff. ANESTHESIA:  General.    COMPLICATIONS:  None immediate. SPECIMENS REMOVED:  1.  Spleen. 2.  Pancreatic margin for frozen section. 3.  Distal pancreas. IMPLANTS:  None. ESTIMATED BLOOD LOSS:  Approximately 400 mL. DRAINS:  Donald-Doe drain left near the staple line on the pancreas. FINDINGS:  There was a palpable mass in the neck of the pancreas. The splenic artery was embedded within the mass and more distal pancreas. DESCRIPTION OF PROCEDURE:  After obtaining informed consent, the patient was taken to the operating room and placed supine on the operating table. An operative time-out was performed and general endotracheal anesthesia was induced. Preoperative antibiotics were administered. A Puga catheter was placed. An oral gastric tube was placed. The abdomen was prepped and draped in the usual sterile fashion. An incision was made just above the umbilicus and the abdomen was entered using a 5-mm optical trocar. The abdomen was visually inspected and there was no evidence of peritoneal implants or surface liver nodules. We then proceed with open operation. The incision was extended cephalad and the falciform ligament was divided using the Enseal device. Bookwalter retractor was used to improve exposure. The liver was palpated and no masses were noted. The omentum was elevated and the omentum between the stomach and colon was opened using the Enseal device. The colon was reflected inferiorly.   This exposed the underlying pancreatic tissue. The pancreas was palpated and the known mass was palpable. We then worked towards the spleen in order to take the omentum down off of the left aspect of the stomach and then performed splenic flexure take down. This was done primarily with the Enseal device. Once the splenic flexure was adequately mobilized and packed off inferiorly, dissection of the distal pancreas was undertaken. This was done using blunt dissection with cystic duct clamps, right angle clamps, and the Enseal.  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 At this point, we decided to work on the more proximal areas of the pancreas. The inferior mesenteric vein was identified and this was traced up under the head of the pancreas. A window was created between the head of the pancreas and the inferior mesenteric vein as it transitioned into the portal vein. We identified the confluence of the splenic vein at this site as well, there was a pulsatile structure within the pancreas directly adjacent to the mass. Above the pancreas, this was dissected out and I placed a vessel loop around it. We then occluded the artery with the vessel loop and the hepatic artery was palpated and continued to be pulsatile. The small bowel was inspected and this remained pink and healthy appearing. We left the vessel loop taut and continued dissection. After about five minutes, we again palpated the hepatic artery which was pulsatile and the small bowel was healthy appearing. Inspection of the spleen revealed ischemic changes. This was then confirmed to be the splenic artery and it was divided between 0 silk ties. Two ties were left on the patient side. We were then able to work on the short gastric, which were divided using Enseal device. The spleen was medially rotated and we were then able to ligate the splenic vein in the splenic hilum.   This enabled us to remove this spleen and pass it off. Dissection of the pancreas off of the retroperitoneum continued, and eventually, we had it fully mobilized to the head of the pancreas. The confluence of the splenic vein and portal vein was identified, and the splenic vein was then high ligated there using 0 silk ties with two left on the patient side. The splenic vein was left in continuity with the pancreas. We then divided the pancreas just proximal to the mass using a TA stapler. A margin of the removed pancreas was sent for frozen section. No tumor was seen. Rest of the pancreas was sent off for permanent pathology. The staple line was intact. The bowel was again inspected and was healthy appearing. The hepatic artery was pulsatile. The operative field and the stapled end of the pancreas were coated with Tisseel. A drain was brought in and left adjacent to the cut edge to the pancreas. This was brought out through the abdominal wall on the right side and sutured to the skin with a  nylon suture. The abdomen was inspected for hemostasis and excellent hemostasis was noted. The retractors were removed and the fascia was closed from above and below using #1 barbed PDS Stratafix. The subcutaneous tissue were irrigated with saline and the skin was reapproximated with buried interrupted 3-0 Vicryl sutures and then closed with staples. Sterile dressing was applied and a dressing was placed around the drain. The patient was recovered from general anesthesia and taken to the recovery area in satisfactory condition. All instruments, sponge, and needle counts were reported as correct. Rolando Sampson MD      [id number]  D:  06/21/2022 11:48  T:  06/21/2022 14:14  JOB #:  [id number]  CC:  MD Nara Mack MD Mubashir A. Elvan Boy, MD

## 2022-06-21 NOTE — PROGRESS NOTES
End of Shift Note    Bedside shift change report given to Jhony Mcgee (oncoming nurse) by Victoriano Salvador RN (offgoing nurse). Report included the following information SBAR, Kardex, Intake/Output, MAR and Recent Results    Shift worked: 9115-1316     Shift summary and any significant changes:    No significant changes during shift   Concerns for physician to address: None   Zone phone for oncoming shift:       Activity:  Activity Level: Up with Assistance  Number times ambulated in hallways past shift: 3 to the bathroom  Number of times OOB to chair past shift: 0    Cardiac:   Cardiac Monitoring: No      Cardiac Rhythm: Sinus Rhythm    Access:   Current line(s): PIV     Genitourinary:   Urinary status: voiding    Respiratory:   O2 Device: None (Room air)  Chronic home O2 use?: NO  Incentive spirometer at bedside: YES  Actual Volume (ml): 500 ml    GI:  Last Bowel Movement Date: 06/14/22  Current diet:  ADULT DIET Full Liquid; 3 carb choices (45 gm/meal); Low Fat/Low Chol/High Fiber/2 gm Na  Passing flatus: YES  Tolerating current diet: YES       Pain Management:   Patient states pain is manageable on current regimen: YES    Skin:  Saw Score: 20  Interventions: float heels and increase time out of bed    Patient Safety:  Fall Score:  Total Score: 3  Interventions: gripper socks and pt to call before getting OOB  High Fall Risk: Yes    Length of Stay:  Expected LOS: 3d 16h  Actual LOS: 6      Victoriano Salvador RN

## 2022-06-22 LAB
BASOPHILS # BLD: 0 K/UL (ref 0–0.1)
BASOPHILS NFR BLD: 0 % (ref 0–1)
DIFFERENTIAL METHOD BLD: ABNORMAL
EOSINOPHIL # BLD: 0 K/UL (ref 0–0.4)
EOSINOPHIL NFR BLD: 0 % (ref 0–7)
ERYTHROCYTE [DISTWIDTH] IN BLOOD BY AUTOMATED COUNT: 12.5 % (ref 11.5–14.5)
GLUCOSE BLD STRIP.AUTO-MCNC: 130 MG/DL (ref 65–117)
GLUCOSE BLD STRIP.AUTO-MCNC: 139 MG/DL (ref 65–117)
GLUCOSE BLD STRIP.AUTO-MCNC: 146 MG/DL (ref 65–117)
GLUCOSE BLD STRIP.AUTO-MCNC: 254 MG/DL (ref 65–117)
HCT VFR BLD AUTO: 32.2 % (ref 35–47)
HGB BLD-MCNC: 11 G/DL (ref 11.5–16)
IMM GRANULOCYTES # BLD AUTO: 0 K/UL (ref 0–0.04)
IMM GRANULOCYTES NFR BLD AUTO: 0 % (ref 0–0.5)
LIPASE FLD-CCNC: 37 U/L
LYMPHOCYTES # BLD: 2 K/UL (ref 0.8–3.5)
LYMPHOCYTES NFR BLD: 14 % (ref 12–49)
MCH RBC QN AUTO: 30.5 PG (ref 26–34)
MCHC RBC AUTO-ENTMCNC: 34.2 G/DL (ref 30–36.5)
MCV RBC AUTO: 89.2 FL (ref 80–99)
MONOCYTES # BLD: 0.7 K/UL (ref 0–1)
MONOCYTES NFR BLD: 5 % (ref 5–13)
NEUTS BAND NFR BLD MANUAL: 1 %
NEUTS SEG # BLD: 11.4 K/UL (ref 1.8–8)
NEUTS SEG NFR BLD: 80 % (ref 32–75)
NRBC # BLD: 0.18 K/UL (ref 0–0.01)
NRBC BLD-RTO: 1.3 PER 100 WBC
PLATELET # BLD AUTO: 489 K/UL (ref 150–400)
PMV BLD AUTO: 10 FL (ref 8.9–12.9)
RBC # BLD AUTO: 3.61 M/UL (ref 3.8–5.2)
RBC MORPH BLD: ABNORMAL
SERVICE CMNT-IMP: ABNORMAL
SPECIMEN SOURCE FLD: NORMAL
WBC # BLD AUTO: 14.1 K/UL (ref 3.6–11)

## 2022-06-22 PROCEDURE — 74011000250 HC RX REV CODE- 250: Performed by: SURGERY

## 2022-06-22 PROCEDURE — 74011636637 HC RX REV CODE- 636/637: Performed by: SURGERY

## 2022-06-22 PROCEDURE — 65270000029 HC RM PRIVATE

## 2022-06-22 PROCEDURE — 74011250636 HC RX REV CODE- 250/636: Performed by: SURGERY

## 2022-06-22 PROCEDURE — 99232 SBSQ HOSP IP/OBS MODERATE 35: CPT

## 2022-06-22 PROCEDURE — 82962 GLUCOSE BLOOD TEST: CPT

## 2022-06-22 PROCEDURE — 74011250637 HC RX REV CODE- 250/637: Performed by: SURGERY

## 2022-06-22 PROCEDURE — 36415 COLL VENOUS BLD VENIPUNCTURE: CPT

## 2022-06-22 PROCEDURE — 85025 COMPLETE CBC W/AUTO DIFF WBC: CPT

## 2022-06-22 PROCEDURE — 97116 GAIT TRAINING THERAPY: CPT

## 2022-06-22 RX ADMIN — POTASSIUM CHLORIDE 20 MEQ: 20 TABLET, EXTENDED RELEASE ORAL at 08:28

## 2022-06-22 RX ADMIN — INSULIN GLARGINE 18 UNITS: 100 INJECTION, SOLUTION SUBCUTANEOUS at 08:28

## 2022-06-22 RX ADMIN — DEXTROSE MONOHYDRATE, SODIUM CHLORIDE, AND POTASSIUM CHLORIDE: 50; 4.5; 2.98 INJECTION, SOLUTION INTRAVENOUS at 05:13

## 2022-06-22 RX ADMIN — SODIUM CHLORIDE, PRESERVATIVE FREE 5 ML: 5 INJECTION INTRAVENOUS at 13:16

## 2022-06-22 RX ADMIN — PANTOPRAZOLE SODIUM 40 MG: 40 TABLET, DELAYED RELEASE ORAL at 08:28

## 2022-06-22 RX ADMIN — Medication 2 UNITS: at 11:55

## 2022-06-22 RX ADMIN — LEVOTHYROXINE SODIUM 137 MCG: 0.11 TABLET ORAL at 08:28

## 2022-06-22 RX ADMIN — ENOXAPARIN SODIUM 40 MG: 100 INJECTION SUBCUTANEOUS at 05:13

## 2022-06-22 RX ADMIN — Medication 8 UNITS: at 08:28

## 2022-06-22 RX ADMIN — SODIUM CHLORIDE, PRESERVATIVE FREE 10 ML: 5 INJECTION INTRAVENOUS at 21:36

## 2022-06-22 RX ADMIN — Medication 2 UNITS: at 16:04

## 2022-06-22 RX ADMIN — POLYETHYLENE GLYCOL 3350 17 G: 17 POWDER, FOR SOLUTION ORAL at 08:28

## 2022-06-22 RX ADMIN — POTASSIUM CHLORIDE 20 MEQ: 20 TABLET, EXTENDED RELEASE ORAL at 21:26

## 2022-06-22 RX ADMIN — AMLODIPINE BESYLATE 10 MG: 5 TABLET ORAL at 08:28

## 2022-06-22 RX ADMIN — SODIUM CHLORIDE, PRESERVATIVE FREE 10 ML: 5 INJECTION INTRAVENOUS at 05:19

## 2022-06-22 NOTE — PROGRESS NOTES
End of Shift Note    Bedside shift change report given to Esdras Burton RN (oncoming nurse) by Young Knight (offgoing nurse). Report included the following information SBAR, Kardex, Procedure Summary, Intake/Output and MAR    Shift worked:  7a-7p     Shift summary and any significant changes:     Patient up with therapy and nursing, to chair for meals. VICTORIANO output in flowsheets. Tolerating meals, diet advanced. Patient passing flatus and BM. Concerns for physician to address:       Zone phone for oncoming shift:          Activity:  Activity Level: Up with Assistance  Number times ambulated in hallways past shift: 0  Number of times OOB to chair past shift: 3    Cardiac:   Cardiac Monitoring: Yes      Cardiac Rhythm: Sinus Rhythm    Access:   Current line(s): PICC     Genitourinary:   Urinary status: voiding    Respiratory:   O2 Device: None (Room air)  Chronic home O2 use?: NO  Incentive spirometer at bedside: YES  Actual Volume (ml): 500 ml    GI:  Last Bowel Movement Date: 06/20/22  Current diet:  ADULT DIET Regular; 3 carb choices (45 gm/meal); Low Fat/Low Chol/High Fiber/2 gm Na  Passing flatus: YES  Tolerating current diet: YES       Pain Management:   Patient states pain is manageable on current regimen: YES    Skin:  Saw Score: 20  Interventions: increase time out of bed    Patient Safety:  Fall Score:  Total Score: 3  Interventions: assistive device (walker, cane, etc), gripper socks and pt to call before getting OOB  High Fall Risk: Yes    Length of Stay:  Expected LOS: 3d 16h  Actual LOS: [place] [address]

## 2022-06-22 NOTE — PROGRESS NOTES
Transition of Care Plan:     RUR:10%  Disposition:Home w/family-pt declined HH  Follow up appointments:  DME needed:r/w-[facility]  Transportation at [place] [address] [facility] or means to access home:        IM Medicare Letter:2nd IM needed  Is patient a BCPI-A Bundle:   n/a                   If yes, was Bundle Letter given?:    Is patient a [place] and connected with the [place]?                If yes, was [employer] transfer form completed and VA notified? [facility]giver [facility] [phone]  Discharge Caregiver contacted prior to discharge? Care Conference needed? :         CM offered HH & pt declined again. Patient is aware, if she changes her mind after d/c, to call PCP to set up [facility]. CM will continue to follow.     Saranya Melendez  Ext [phone]

## 2022-06-22 NOTE — PROGRESS NOTES
Problem: Falls - Risk of  Goal: *Absence of Falls  Description: Document Betzy Mccullough Fall Risk and appropriate interventions in the flowsheet.   Outcome: Progressing Towards Goal  Note: Fall Risk Interventions:  Mobility Interventions: Bed/chair exit alarm,Communicate number of staff needed for ambulation/transfer,Patient to call before getting OOB         Medication Interventions: Evaluate medications/consider consulting pharmacy,Patient to call before getting OOB,Teach patient to arise slowly    Elimination Interventions: Call light in reach

## 2022-06-22 NOTE — PROGRESS NOTES
Attending provider:  Brandi Tolentino MD   PCP:  Litzy Youngblood MD    Subjective:    Patient seen and examined by me today. Doing well. BM's, loose. Had an episode of hypoglycemia yesterday. Still not eating much. Objective:    Blood pressure (!) 152/62, pulse 79, temperature 98 °F (36.7 °C), resp. rate 18, height 5' 6\" (1.676 m), weight 61.2 kg (134 lb 14.7 oz), SpO2 98 %. Drains - SS. Lipase was within normal limits. Exam - A&O, NAD.  CTAB, RRR. Abdomen soft, ND, active BS. Appropriately TTP. Dressing CDI. No edema. Assessment:  Procedure(s):  DIAGNOSTIC LAPAROSCOPY, DISTAL PANCREATECTOMY, SPLENECTOMY 6/15/2022    Active Hospital Problems    Diagnosis Date Noted    Pancreas carcinoma ([facility] 06/15/2022     - acute leukocytosis secondary to splenectomy coming down. Platelets stable at about 490.    - acute DM secondary to pancreatectomy. Plan:  - reg ADA diet. - Remove VICTORIANO drain.  - DVT prophylaxis - Lovenox. AMBULATE. - may need to adjust long-acting insulin. Will watch her 1 more day to be sure that she does not bottom out.

## 2022-06-22 NOTE — DIABETES MGMT
[facility] [facility]    CLINICAL NURSE SPECIALIST CONSULT     Initial Presentation   Beltran Rodriguez is a 66 y.o. female admitted 6/15/22 for surgical intervention. Prior to admission, seen by Dr. Brayton Leventhal and diagnosed with pancreatic cancer; obtained cardiac clearance via Dr. Trev Mc. 11/5/21 MRI ABd: Pancreas: Mass in the body the pancreas measuring 17 x 17 mm with distal pancreatic atrophy and ductal dilatation as well as side branch ectasia. The lesion is relatively hypointense on T2-weighted images  2/1/22 CT ABd: Hypoenhancing mass in the pancreatic neck persists, measuring approximately 2.6 x 2.2 cm (image 41) without significant change since MRI allowing for differences in imaging modality. There is upstream pancreatic duct dilation with two foci of side branch ectasia of approximately 1 cm size each. There is generalized pancreatic atrophy. Regional vessels are uninvolved. Regional nodes are stable, largest measuring 1.3 cm (image 69).  5/5/22 CT Pelvis: PANCREAS: Dilatation of the pancreatic duct. 2.8 x 3.0 x 2.2 cm mass in the body of the pancreas previously measured 2.4 x 2.6 x 2.3 cm. There are multiple peripancreatic lymph nodes. There are 2 lymph nodes on image 52. One of the lymph nodes is seen anterior to the pancreatic body measures 7.1 mm. The second lymph node is seen posterior to the SMV and measures 10 mm. This previously measured 8 mm. There are several smaller lymph nodes adjacent to a right SMA branch on image 55. There is associated side branch ectasia. A lymph node in the right retroperitoneum measures 10 mm and is stable. A lymph node on coronal image 63 measures 11 mm and is stable.     HX:   Past Medical History:   Diagnosis Date    Arthritis     fingers    CAD (coronary artery disease)     Cancer ([address] 2013    melanoma - left forearm    Cancer ([address] 2022    pancreatic    Carotid artery disease ([address]     Diabetes ([address]     Type 2    Hypercholesterolemia     Hypertension     Long term current use of anticoagulant therapy     ASA    Thyroid disease     thyroidectomy      INITIAL DX:   Pancreas carcinoma ([facility] [C25.9]     Current Treatment     TX: 6/15/22 DIAGNOSTIC LAPAROSCOPY, DISTAL PANCREATECTOMY, SPLENECTOMY   BP managemetn. Clot prevention. Insulin. Synthroid    Consulted by Provider for advanced diabetes nursing assessment and care for:   [] Transitioning off Wayne Deter   [x] Inpatient management strategy  [] Home management assessment  [x] Survival skill education    Hospital Course   Clinical progress has been complicated by type of surgery. 6/16/22 Alert & oriented. Reporting nausea with NG in place; given Zofran. Pain control via Dilaudid PCA   6/17/22 Looks and feels better. Using O2NC. Intermittent nausea persists. Diabetes History   Type 2 diabetes on oral agents. Family history of diabetes in maternal aunt. Diabetes-related Medical History  Macrovascular disease  CAD    Diabetes Medication History  Key Antihyperglycemic Medications             metFORMIN (GLUCOPHAGE) 500 mg tablet (Taking) Take 1,000 mg by mouth two (2) times daily (with meals). SITagliptin (JANUVIA) 100 mg tablet Take 100 mg by mouth daily (with lunch). Subjective   Thank you for your help     Objective   Physical exam  General Thin female who is in no acute distress. Conversant & cooperative  Neuro  Alert, oriented   Vital Signs   Visit Vitals  BP (!) 152/62 (BP 1 Location: Right upper arm, BP Patient Position: At rest)   Pulse 79   Temp 98 °F (36.7 °C)   Resp 18   Ht 5' 6\" (1.676 m)   Wt 61.2 kg (134 lb 14.7 oz)   SpO2 98%   BMI 21.78 kg/m²     Laboratory  Recent Labs     06/22/22  0259 06/20/22  0946   GLU  --  267*   AGAP  --  10   WBC 14.1* 16.8*   CREA  --  0.50*   GFRNA  --  >60     Factors impacting BG management  Factor Dose Comments   Nutrition:   45 gm carb meal    Infection Cefoxitin (completed)  Afebrile.  Elevated WBC   Other:   Kidney function Normal      Blood glucose pattern      Significant diabetes-related events over the past 24-72 hours  Admission . GFR >60  6/15/22 DISTAL PANCREATECTOMY  On corrective insulin => used 28 units of total insulin post-operatively to bring BG to target  6/17/22 Used 14 units of corrective insulin yesterday    Assessment and Plan   Nursing Diagnosis Risk for unstable blood glucose pattern   Nursing Intervention Domain 0927 Decision-making Support   Nursing Interventions Examined current inpatient diabetes/blood glucose control   Explored factors facilitating and impeding inpatient management  Explored corrective strategies with patient and responsible inpatient provider   Informed patient of rational for insulin strategy while hospitalized     Evaluation   This thin  female was admitted for surgical intervention and underwent removal of pancreatic mass. Consulted by surgeon to address BG management. Patient was treated for Type 2 diabetes with oral agents PTA. Patient was nauseous yesterday. According to OP notes, patient had a gap in primary care so I'm curious whether she had up-to-date prescriptions. Upon admission, BG elevated in the 200s so her pancreatic function was questionable before the surgery and will likely diminish as a result of the amount of pancreatic beta cell tissue that was removed. It took 28 units of insulin after surgery to bring BGs to target. Used 14 units of corrective insulin yesterday 6/16/22. Again, would recommend starting a basal dose of insulin. Some info copied from GASPER Dhillon note    6/20/22 BG's elevated today. The patient is using correction insulin only. I recommend a scheduled basal insulin dose. She used a total of 18 units correction insulin yesterday. Consider 0.3 x kg ( 18 units Lantus daily). 6/21/22  The morning BG was above goal this more. However, the patient was started on on scheduled basal dose of 18 units Lantus today.  Diabetes management will continue monitor BG trends and make recommendations as needed. Insulin pen administration demonstration provided. The patient was proficient. The patient was able to verbalize the steps on glucometer use. Hyperglycemia, hypoglycemia and sick day management discussed. The patient verbalizes understanding. ADA booklet provided. The patient's brother was also present. 6/22/22 Morning BG above goal today, but is stable by late morning on 18 units Lantus. I recommend continuing on current diabetes medication regimen. I suspect the patient may require more at some point. I recommend the patient to follow-up with PCP or endocrinologist for future diabetes medication management. Insulin pen administration provided again to today to reinforce prior day learning. Recommendations     1. Continue 18 units ( 0.3 x kg) daily     Billing Code(s)   [] 59253 IP subsequent hospital care - 35 minutes [] 39828 Prolonged Services - 65 minutes [] 26955 Prolonged Services - 110 minutes  [x] 71880 IP subsequent hospital care - 25 minutes [] 81679 Prolonged Services - 55 minutes [] 80332 Prolonged Services - 100 minutes  [] 54006 IP subsequent hospital care - 15 minutes [] 43055 Prolonged Services - 45 minutes [] 25252 Prolonged Services - 90 minutes    Before making these care recommendations, I personally reviewed the hospitalization record, including notes, laboratory & diagnostic data and current medications, and examined the patient at the bedside (circumstances permitting) before making care recommendations. More than fifty (50) percent of the time was spent in patient counseling and/or care coordination.   Total minutes: 25    GASPER Murcia  Diabetes Clinical Nurse Specialist  [facility]  Access via [facility]

## 2022-06-22 NOTE — PROGRESS NOTES
Problem: Mobility Impaired (Adult and Pediatric)  Goal: *Acute Goals and Plan of Care (Insert Text)  Description: FUNCTIONAL STATUS PRIOR TO ADMISSION: Patient was independent and active without use of DME.    HOME SUPPORT PRIOR TO ADMISSION: The patient lived alone with Brother to provide assistance as needed. Physical Therapy Goals  Initiated 6/17/2022  1. Patient will move from supine to sit and sit to supine , scoot up and down, and roll side to side in bed with modified independence within 7 day(s). 2.  Patient will transfer from bed to chair and chair to bed with modified independence using the least restrictive device within 7 day(s). 3.  Patient will perform sit to stand with modified independence within 7 day(s). 4.  Patient will ambulate with modified independence for 150 feet with the least restrictive device within 7 day(s). Outcome: Progressing Towards Goal   PHYSICAL THERAPY TREATMENT  Patient: Miranda Wu (66 y.o. female)  Date: 6/22/2022  Diagnosis: [facility] ([facility] [C25.9] <principal problem not specified>  Procedure(s) (LRB):  DIAGNOSTIC LAPAROSCOPY, DISTAL PANCREATECTOMY, SPLENECTOMY (N/A) 7 Days Post-Op  Precautions: Fall  Chart, physical therapy assessment, plan of care and goals were reviewed. ASSESSMENT  Patient continues with skilled PT services and is progressing towards goals. Good improvement with overall activity tolerance, balance and mobility skills. Now independent with supine to sit and sit to stand transfers. Gait progressed to 350 feet with RW and sba. Requested to return to bed afterwards as she has been up in the chair much of the day. No longer needs HH PT upon discharge.      Current Level of Function Impacting Discharge (mobility/balance): independent to sba    Other factors to consider for discharge: good home support with brother; independent and active PLOF         PLAN :  Patient continues to benefit from skilled intervention to address the above impairments. Continue treatment per established plan of care. to address goals. Recommendation for discharge: (in order for the patient to meet his/her long term goals)  No skilled physical therapy/ follow up rehabilitation needs identified at this time. This discharge recommendation:  Has been made in collaboration with the attending provider and/or case management    IF patient discharges home will need the following DME: RW has been delivered for discharge       SUBJECTIVE:   Patient stated  I feel like my strength is slowly improving.      OBJECTIVE DATA SUMMARY:   Critical Behavior:  Neurologic State: Alert  Orientation Level: Oriented X4  Cognition: Appropriate decision making,Appropriate for age attention/concentration,Appropriate safety awareness,Follows commands  Safety/Judgement: Awareness of environment,Good awareness of safety precautions  Functional Mobility Training:  Bed Mobility:  Rolling: Modified independent  Supine to Sit: Modified independent  Sit to Supine: Modified independent  Scooting: Modified independent        Transfers:  Sit to Stand: Modified independent  Stand to Sit: Modified independent        Bed to Chair: Supervision (due to lines/IV pole)                    Balance:  Sitting: Intact  Standing: Impaired  Standing - Static: Good;Occasional  Standing - Dynamic : Good;Constant support  Ambulation/Gait Training:  Distance (ft): 350 Feet (ft)  Assistive Device: Gait belt;Walker, rolling  Ambulation - Level of Assistance: Stand-by assistance        Gait Abnormalities: Path deviations (no lob)              Speed/Missy: Pace decreased (<100 feet/min)  Step Length: Right shortened;Left shortened        Pain Rating:  No complaints of pain    Activity Tolerance:   Good and SpO2 stable on RA    After treatment patient left in no apparent distress:   Supine in bed and Call bell within reach    COMMUNICATION/COLLABORATION:   The patients plan of care was discussed with: Ben nurse.     Anabella Turner, PT   Time Calculation: 13 mins

## 2022-06-22 NOTE — PROGRESS NOTES
End of Shift Note    Bedside shift change report given to [email or web address].VN (oncoming nurse) by Duane Tate RN (offgoing nurse). Report included the following information SBAR, Kardex, Procedure Summary, Intake/Output, MAR and Recent Results    Shift worked:     7pm- 7am     Shift summary and any significant changes  patient ambulated without staff assistance,surgical wound intact, VICTORIANO drain with small to moderate out-put, drainage serosanguineous, over all assessment benign. Concerns for physician to address:  none at this time     Zone phone for oncoming shift:         Activity:  Activity Level: Up with Assistance  Number times ambulated in hallways past shift: 0 ambulation best during the day when not tired ( per patient). Number of times OOB to chair past shift: 0  Cardiac:   Cardiac Monitoring:no     Cardiac Rhythm: Sinus Rhythm    Access:   Current line(s): PIV     Genitourinary:   Urinary status: voiding    Respiratory:   O2 Device: None (Room air)  Chronic home O2 use?: NO  Incentive spirometer at bedside: YES  Actual Volume (ml): 500 ml    GI:  Last Bowel Movement Date: 06/20/22 06/21/22  Current diet:  ADULT DIET Regular; 3 carb choices (45 gm/meal); Low Fat/Low Chol/High Fiber/2 gm Na  Passing flatus: Yes  Tolerating current diet: Yes       Pain Management:   Patient states pain is manageable on current regimen: YES     Skin:  Saw Score: 20  Interventions: PT/OT consult, limit briefs and nutritional support     Patient Safety:  Fall Score:  Total Score: 3  Interventions: gripper socks and pt to call before getting OOB  High Fall Risk: Yes    Length of Stay:  Expected LOS: 3d 16h  Actual LOS: 95 Gabriela Sherman RN

## 2022-06-22 NOTE — PROGRESS NOTES
Comprehensive Nutrition Assessment    Type and Reason for Visit: Initial,RD nutrition re-screen/LOS    Nutrition Recommendations/Plan:   1. Continue diet as tolerated  2. RD to add Ensure High Protein TID  3. Recheck phos/mag in AM (phos very low on 6/18, never rechecked)      Malnutrition Assessment:  Malnutrition Status:  Severe malnutrition (06/22/22 1608)    Context:  Chronic illness     Findings of the 6 clinical characteristics of malnutrition:   Energy Intake:  75% or less est energy requirements for 1 month or longer  Weight Loss:  Greater than 10% over 6 months     Body Fat Loss:  Mild body fat loss, Triceps,Fat overlying ribs   Muscle Mass Loss:  Mild muscle mass loss, Clavicles (pectoralis &deltoids),Scapula (trapezius)  Fluid Accumulation:  No significant fluid accumulation,     Strength:  Not performed         Nutrition Assessment:  Pt admitted with pancreatic CA. PMH: pancreatic CA, CAD, DM, HTN. Chart reviewed for LOS, pt sitting up in bed awake and alert. She is s/p lap distal pancreatectomy + splenectomy on 6/15. MST negative for previous nutritional risk factors however this was likely filled out in error as pt endorses both poor appetite and significant wt loss PTA. She reports 30lb wt loss over the past 6 months which is significant for the timeframe. See malnutrition assessment below. She is snacking but not taking in much PO. -254, diabetes team is following and making recommendations. Pt agreeable to try ensure high protein, encouraged her to continue preop supplements at home (she reports having several left as she could not drink 4 per day as instructed). Noted a phos level of 1.4 on 6/18, never rechecked. Will check phos and mag given severe malnutrition and refeeding risk.       Patient Vitals for the past 168 hrs:   % Diet Eaten   06/21/22 1540 51 - 75%   06/21/22 1330 1 - 25%   06/21/22 0740 51 - 75%   06/20/22 0903 26 - 50%   06/16/22 1824 0%   06/16/22 1717 0% 06/16/22 1313 0%   06/16/22 0807 0%     Wt Readings from Last 15 Encounters:   06/15/22 61.2 kg (134 lb 14.7 oz)   06/02/22 65.6 kg (144 lb 10 oz)   05/16/22 62.1 kg (137 lb)   04/25/22 62.5 kg (137 lb 12.8 oz)   04/19/22 62.6 kg (138 lb)   04/07/22 62.6 kg (138 lb)   03/03/22 62.4 kg (137 lb 8 oz)   02/28/22 62.6 kg (138 lb)   02/11/22 62.1 kg (137 lb)   01/24/22 61.2 kg (135 lb)   01/12/22 61.7 kg (136 lb)   10/12/18 68.2 kg (150 lb 6 oz)   10/05/18 69.2 kg (152 lb 8.9 oz)   08/01/13 74.8 kg (165 lb)   07/17/13 72.7 kg (160 lb 5 oz)          Nutrition Related Findings:    Meds: lantus, humalog, protonix, miralax, KCl, D5, [email or web address]. Edema: [email or web address]. BM 6/21 Wound Type: Surgical incision    Current Nutrition Intake & Therapies:  Average Meal Intake: 26-50%  Average Supplement Intake: None ordered  ADULT DIET Regular; 3 carb choices (45 gm/meal); Low Fat/Low Chol/High Fiber/2 gm Na  ADULT ORAL NUTRITION SUPPLEMENT Breakfast, Lunch, Dinner; Low Calorie/High Protein    Anthropometric Measures:  Height: 5' 6\" (167.6 cm)  Ideal Body Weight (IBW): 130 lbs (59 kg)     Current Body Wt:  61.2 kg (134 lb 14.7 oz), 103.8 % IBW.  Standing scale  Current BMI (kg/m2): 21.8  Usual Body Weight: 74.8 kg (165 lb)  % Weight Change (Calculated): -18.2                    BMI Category: Underweight (BMI less than 22) age over 72    Estimated Daily Nutrient Needs:  Energy Requirements Based On: Formula  Weight Used for Energy Requirements: Current  Energy (kcal/day): MSJ 1500 (1139 x 1.3)  Weight Used for Protein Requirements: Current  Protein (g/day): 85g (1.4gPro/kg)  Method Used for Fluid Requirements: 1 ml/kcal  Fluid (ml/day): 1500mL    Nutrition Diagnosis:   · Severe malnutrition related to altered GI function,inadequate protein-energy intake as evidenced by weight loss greater than or equal to 10% in 6 months,poor intake prior to admission      Nutrition Interventions:   Food and/or Nutrient Delivery: Continue current diet,Start oral nutrition supplement  Nutrition Education/Counseling: No recommendations at this time  Coordination of Nutrition Care: Continue to monitor while inpatient       Goals:     Goals: PO intake 50% or greater,by next RD assessment,within 2 days (in 2-4 days)       Nutrition Monitoring and Evaluation:   Behavioral-Environmental Outcomes: None identified  Food/Nutrient Intake Outcomes: Food and nutrient intake,Supplement intake  Physical Signs/Symptoms Outcomes: Biochemical data,Nutrition focused physical findings,Skin,Weight    Discharge Planning:    Continue current diet,Continue oral nutrition supplement    Shailesh Moeller RD, CNSC  Contact: ext [phone]

## 2022-06-23 VITALS
BODY MASS INDEX: 21.68 KG/M2 | DIASTOLIC BLOOD PRESSURE: 76 MMHG | OXYGEN SATURATION: 82 % | WEIGHT: 134.92 LBS | HEIGHT: 66 IN | RESPIRATION RATE: 18 BRPM | TEMPERATURE: 97.9 F | HEART RATE: 82 BPM | SYSTOLIC BLOOD PRESSURE: 125 MMHG

## 2022-06-23 LAB
GLUCOSE BLD STRIP.AUTO-MCNC: 130 MG/DL (ref 65–117)
GLUCOSE BLD STRIP.AUTO-MCNC: 207 MG/DL (ref 65–117)
MAGNESIUM SERPL-MCNC: 2 MG/DL (ref 1.6–2.4)
PHOSPHATE SERPL-MCNC: 3.3 MG/DL (ref 2.6–4.7)
SERVICE CMNT-IMP: ABNORMAL
SERVICE CMNT-IMP: ABNORMAL

## 2022-06-23 PROCEDURE — 90471 IMMUNIZATION ADMIN: CPT

## 2022-06-23 PROCEDURE — 74011250637 HC RX REV CODE- 250/637: Performed by: SURGERY

## 2022-06-23 PROCEDURE — 90734 MENACWYD/MENACWYCRM VACC IM: CPT | Performed by: NURSE PRACTITIONER

## 2022-06-23 PROCEDURE — 90670 PCV13 VACCINE IM: CPT | Performed by: NURSE PRACTITIONER

## 2022-06-23 PROCEDURE — 74011250636 HC RX REV CODE- 250/636: Performed by: SURGERY

## 2022-06-23 PROCEDURE — 74011250636 HC RX REV CODE- 250/636: Performed by: NURSE PRACTITIONER

## 2022-06-23 PROCEDURE — 90620 MENB-4C VACCINE IM: CPT | Performed by: NURSE PRACTITIONER

## 2022-06-23 PROCEDURE — 90648 HIB PRP-T VACCINE 4 DOSE IM: CPT | Performed by: NURSE PRACTITIONER

## 2022-06-23 PROCEDURE — 84100 ASSAY OF PHOSPHORUS: CPT

## 2022-06-23 PROCEDURE — 36415 COLL VENOUS BLD VENIPUNCTURE: CPT

## 2022-06-23 PROCEDURE — 99231 SBSQ HOSP IP/OBS SF/LOW 25: CPT

## 2022-06-23 PROCEDURE — 83735 ASSAY OF MAGNESIUM: CPT

## 2022-06-23 PROCEDURE — 74011636637 HC RX REV CODE- 636/637: Performed by: SURGERY

## 2022-06-23 PROCEDURE — 74011000636 HC RX REV CODE- 636: Performed by: NURSE PRACTITIONER

## 2022-06-23 PROCEDURE — 82962 GLUCOSE BLOOD TEST: CPT

## 2022-06-23 PROCEDURE — 74011000250 HC RX REV CODE- 250: Performed by: SURGERY

## 2022-06-23 RX ADMIN — HAEMOPHILUS B POLYSACCHARIDE CONJUGATE VACCINE FOR INJ 0.5 ML: RECON SOLN at 16:52

## 2022-06-23 RX ADMIN — Medication 6 UNITS: at 08:55

## 2022-06-23 RX ADMIN — NEISSERIA MENINGITIDIS SEROGROUP B NHBA FUSION PROTEIN ANTIGEN, NEISSERIA MENINGITIDIS SEROGROUP B FHBP FUSION PROTEIN ANTIGEN AND NEISSERIA MENINGITIDIS SEROGROUP B NADA PROTEIN ANTIGEN 0.5 ML: 50; 50; 50; 25 INJECTION, SUSPENSION INTRAMUSCULAR at 16:45

## 2022-06-23 RX ADMIN — POLYETHYLENE GLYCOL 3350 17 G: 17 POWDER, FOR SOLUTION ORAL at 08:54

## 2022-06-23 RX ADMIN — AMLODIPINE BESYLATE 10 MG: 5 TABLET ORAL at 08:55

## 2022-06-23 RX ADMIN — ENOXAPARIN SODIUM 40 MG: 100 INJECTION SUBCUTANEOUS at 06:13

## 2022-06-23 RX ADMIN — SODIUM CHLORIDE, PRESERVATIVE FREE 10 ML: 5 INJECTION INTRAVENOUS at 06:13

## 2022-06-23 RX ADMIN — PANTOPRAZOLE SODIUM 40 MG: 40 TABLET, DELAYED RELEASE ORAL at 08:56

## 2022-06-23 RX ADMIN — PNEUMOCOCCAL 13-VALENT CONJUGATE VACCINE 0.5 ML: 2.2; 2.2; 2.2; 2.2; 2.2; 4.4; 2.2; 2.2; 2.2; 2.2; 2.2; 2.2; 2.2 INJECTION, SUSPENSION INTRAMUSCULAR at 16:42

## 2022-06-23 RX ADMIN — DEXTROSE MONOHYDRATE, SODIUM CHLORIDE, AND POTASSIUM CHLORIDE: 50; 4.5; 2.98 INJECTION, SOLUTION INTRAVENOUS at 00:14

## 2022-06-23 RX ADMIN — POTASSIUM CHLORIDE 20 MEQ: 20 TABLET, EXTENDED RELEASE ORAL at 08:55

## 2022-06-23 RX ADMIN — INSULIN GLARGINE 18 UNITS: 100 INJECTION, SOLUTION SUBCUTANEOUS at 08:55

## 2022-06-23 RX ADMIN — NEISSERIA MENINGITIDIS GROUP A CAPSULAR POLYSACCHARIDE DIPHTHERIA TOXOID CONJUGATE ANTIGEN, NEISSERIA MENINGITIDIS GROUP C CAPSULAR POLYSACCHARIDE DIPHTHERIA TOXOID CONJUGATE ANTIGEN, NEISSERIA MENINGITIDIS GROUP Y CAPSULAR POLYSACCHARIDE DIPHTHERIA TOXOID CONJUGATE ANTIGEN, AND NEISSERIA MENINGITIDIS GROUP W-135 CAPSULAR POLYSACCHARIDE DIPHTHERIA TOXOID CONJUGATE ANTIGEN 0.5 ML: 4; 4; 4; 4 INJECTION, SOLUTION INTRAMUSCULAR at 16:37

## 2022-06-23 RX ADMIN — LEVOTHYROXINE SODIUM 137 MCG: 0.11 TABLET ORAL at 08:55

## 2022-06-23 NOTE — DISCHARGE INSTRUCTIONS
Discharge Instructions:  Dr. Lena Coon    Call on next business day to arrange appointment for follow up on 7/5/22 -- [phone]. Activity:  Walk regularly starting immediately. No lifting more than 10 -15 pounds for 6 week(s). Diet:  You may resume normal diet. Wound Care: If you experience a lot of drainage, develop redness around the wound, or a fever over 101 F occurs please call the office. You may shower. No baths or swimming for 2 weeks. Medications:  Resume home medications as indicated on the Medical Reconciliation form.

## 2022-06-23 NOTE — PROGRESS NOTES
End of Shift Note    Bedside shift change report given to OLIVE Bullard (oncoming nurse) by Elvis Nath RN (offgoing nurse). Report included the following information. Shift worked: 9511-0340     Shift summary and any significant changes:    Pt neuro intact. Room air. No pain issues. Surgical sites stable. Voding with no issues.      Concerns for physician to address:      Zone phone for oncoming shift:           Length of Stay:  Expected LOS: 3d 16h  Actual LOS: 7      Elvis Nath RN

## 2022-06-23 NOTE — PROGRESS NOTES
Problem: Falls - Risk of  Goal: *Absence of Falls  Description: Document Gigi James Fall Risk and appropriate interventions in the flowsheet. Outcome: Progressing Towards Goal  Note: Fall Risk Interventions:  Mobility Interventions: Patient to call before getting OOB,OT consult for ADLs,PT Consult for mobility concerns,Bed/chair exit alarm         Medication Interventions: Bed/chair exit alarm,Patient to call before getting OOB,Teach patient to arise slowly    Elimination Interventions: Bed/chair exit alarm,Call light in reach,Patient to call for help with toileting needs              Problem: Pressure Injury - Risk of  Goal: *Prevention of pressure injury  Description: Document Saw Scale and appropriate interventions in the flowsheet. Outcome: Progressing Towards Goal  Note: Pressure Injury Interventions:  Sensory Interventions: Assess changes in LOC    Moisture Interventions: Apply protective barrier, creams and emollients    Activity Interventions: Increase time out of bed,Pressure redistribution bed/mattress(bed type),PT/OT evaluation    Mobility Interventions: HOB 30 degrees or less,Pressure redistribution bed/mattress (bed type),PT/OT evaluation,Turn and reposition approx.  every two hours(pillow and wedges)    Nutrition Interventions: Document food/fluid/supplement intake

## 2022-06-23 NOTE — PROGRESS NOTES
DISCHARGE NOTE FROM [facility] NURSE    Patient determined to be stable for discharge by attending provider. I have reviewed the discharge instructions and follow-up appointments with the patient and other brother . They verbalized understanding and all questions were answered to their satisfaction. No complaints or further questions were expressed. No new medication scripts. Appropriate educational materials and medication side effect teaching were provided. PIV were removed prior to discharge. Patient did not discharge with any line, frederick, or drain. All personal items collected during admission were returned to the patient prior to discharge.     Post-op patient: YES      Charles Camara

## 2022-06-23 NOTE — DISCHARGE SUMMARY
Physician Discharge Summary     Patient ID:  Sandy Flores  [id number]  70 y.o.  1950    Admit Date:  6/15/2022    Discharge Date:  06/23/22    Admission Diagnoses:  Pancreas carcinoma ([facility] [C25.9]  06/23/22    Discharge Diagnoses:  Principal Problem:    Pancreas carcinoma ([address] (6/15/2022)         Surgical Procedure:  Procedure(s):  DIAGNOSTIC LAPAROSCOPY, DISTAL PANCREATECTOMY, SPLENECTOMY      Admission Condition:  Good    Discharge Condition:  Good    Hospital Problems:    Active Hospital Problems    Diagnosis Date Noted    Pancreas carcinoma ([address] 06/15/2022       Hospital Course:   Normal hospital course for this procedure. Platelets cecilia to about 480 and plateaued. Activity level improved. Slow to take adequate calories PO but doing so now. Consults:  None    Disposition:  home    Physical Exam:  Abdomen soft. Bowel sounds normal.  Incision CDI. NT. Visit Vitals  BP (!) 145/68   Pulse 78   Temp 98.1 °F (36.7 °C)   Resp 18   Ht 5' 6\" (1.676 m)   Wt 61.2 kg (134 lb 14.7 oz)   SpO2 96%   BMI 21.78 kg/m²       Discharge medications:    Current Discharge Medication List      CONTINUE these medications which have NOT CHANGED    Details   Cetirizine (ZYRTEC) 10 mg cap Take 1 Tablet by mouth daily. amLODIPine (NORVASC) 10 mg tablet Take 10 mg by mouth daily. metoprolol (LOPRESSOR) 25 mg tablet Take 25 mg by mouth two (2) times a day. lovastatin (MEVACOR) 40 mg tablet Take 40 mg by mouth nightly. metFORMIN (GLUCOPHAGE) 500 mg tablet Take 1,000 mg by mouth two (2) times daily (with meals). levothyroxine (SYNTHROID) 137 mcg tablet Take 137 mcg by mouth Daily (before breakfast). ibuprofen (Motrin IB) 200 mg tablet Take 200 mg by mouth every six (6) hours as needed for Pain.       aspirin-acetaminophen-caffeine (EXCEDRIN ES) 250-250-65 mg per tablet Take 1 Tablet by mouth every six (6) hours as needed for Headache.      multivitamin with minerals (HAIR,SKIN AND NAILS PO) Take 1 Tablet by mouth daily as needed. naproxen sodium (ALEVE PO) Take 1 Tablet by mouth as needed. SITagliptin (JANUVIA) 100 mg tablet Take 100 mg by mouth daily (with lunch). aspirin delayed-release 81 mg tablet Take 81 mg by mouth daily. fluticasone (FLONASE ALLERGY RELIEF) 50 mcg/actuation nasal spray 2 Sprays by Both Nostrils route as needed for Rhinitis. Follow-up: 7/5/22 in my office. Will need follow-up with Dr. Lyndsey Mckay. Will need to check your platelet count. See discharge instructions for further details.      Signed:  Guillaume Shipley MD  6/23/2022  9:35 AM

## 2022-06-23 NOTE — PROGRESS NOTES
Patient is ready for dc from CM standpoint    Transition of Care Plan:     RUR:11%  Disposition:Home w/family-pt declined HH  Follow up appointments:on AVS  DME needed:r/w-[place]  Transportation at [facility] [address] or means to access home:        IM Medicare Letter:2nd IM provided  Is patient a BCPI-A Bundle:   n/a                   If yes, was Bundle Letter given?:    Is patient a  and connected with the [place]?                If yes, was [employer] transfer form completed and [facility] notified? Caregiver Contact:Chapo hutton [phone]  Discharge Caregiver contacted prior to discharge? Care Conference needed?:       Patient is d/c home today. CM asked pt for the third time if she would like [facility] & again pt declined. Patient knows to call here PCP if she changes her mind, once she gets home. No other needs or concerns identified. Care Management Interventions  PCP Verified by CM: Yes  Mode of Transport at Discharge:  Other (see comment) (Kasey hutton)  Transition of Care Consult (CM Consult): Discharge Planning  Discharge Durable Medical Equipment: Yes (r/w-[place])  Physical Therapy Consult: Yes  Occupational Therapy Consult: Yes  Speech Therapy Consult: No  Support Systems: Other Family Member(s) (Lives alone in a one story home w/1 DERICK  brother is staying w/pt temp)  Confirm Follow Up Transport: Self  Discharge Location  Patient Expects to be Discharged to[de-identified] Home with family assistance      Danielle Groves  Ext [phone] [facility]

## 2022-06-23 NOTE — DIABETES MGMT
[facility]   Discharge Summary    Initial Presentation   Chandu Quiñones is a 66 y.o. female admitted 6/15/22 for surgical intervention. Prior to admission, seen by Dr. Ron Seay and diagnosed with pancreatic cancer; obtained cardiac clearance via Dr. Kenny Elizalde. 11/5/21 MRI ABd: Pancreas: Mass in the body the pancreas measuring 17 x 17 mm with distal pancreatic atrophy and ductal dilatation as well as side branch ectasia. The lesion is relatively hypointense on T2-weighted images  2/1/22 CT ABd: Hypoenhancing mass in the pancreatic neck persists, measuring approximately 2.6 x 2.2 cm (image 41) without significant change since MRI allowing for differences in imaging modality. There is upstream pancreatic duct dilation with two foci of side branch ectasia of approximately 1 cm size each. There is generalized pancreatic atrophy. Regional vessels are uninvolved. Regional nodes are stable, largest measuring 1.3 cm (image 69).  5/5/22 CT Pelvis: PANCREAS: Dilatation of the pancreatic duct. 2.8 x 3.0 x 2.2 cm mass in the body of the pancreas previously measured 2.4 x 2.6 x 2.3 cm. There are multiple peripancreatic lymph nodes. There are 2 lymph nodes on image 52. One of the lymph nodes is seen anterior to the pancreatic body measures 7.1 mm. The second lymph node is seen posterior to the SMV and measures 10 mm. This previously measured 8 mm. There are several smaller lymph nodes adjacent to a right SMA branch on image 55. There is associated side branch ectasia. A lymph node in the right retroperitoneum measures 10 mm and is stable. A lymph node on coronal image 63 measures 11 mm and is stable.     HX:   Past Medical History:   Diagnosis Date    Arthritis     fingers    CAD (coronary artery disease)     Cancer ([address] 2013    melanoma - left forearm    Cancer ([address] 2022    pancreatic    Carotid artery disease (HCC)     Diabetes ([address]     Type 2    Hypercholesterolemia     Hypertension     Long term current use of anticoagulant therapy     ASA    Thyroid disease     thyroidectomy      INITIAL DX:   Pancreas carcinoma ([facility] [C25.9]     Current Treatment     TX: 6/15/22 DIAGNOSTIC LAPAROSCOPY, DISTAL PANCREATECTOMY, SPLENECTOMY   BP managemetn. Clot prevention. Insulin. Synthroid    Consulted by Provider for advanced diabetes nursing assessment and care for:   [] Transitioning off [address]   [x] Inpatient management strategy  [] Home management assessment  [x] Survival skill education    Hospital Course   Clinical progress has been complicated by type of surgery. 6/16/22 Alert & oriented. Reporting nausea with NG in place; given Zofran. Pain control via Dilaudid PCA   6/17/22 Looks and feels better. Using O2NC. Intermittent nausea persists. 6/23/22 Appears well. Smiling. Family at bedside. Diabetes History   Type 2 diabetes on oral agents. Family history of diabetes in maternal aunt. Diabetes-related Medical History  Macrovascular disease  CAD    Diabetes Medication History  Key Antihyperglycemic Medications             metFORMIN (GLUCOPHAGE) 500 mg tablet (Taking) Take 1,000 mg by mouth two (2) times daily (with meals). SITagliptin (JANUVIA) 100 mg tablet Take 100 mg by mouth daily (with lunch). Subjective   Thank you for doing this     Objective   Physical exam  General Thin female who is in no acute distress. Conversant & cooperative  Neuro  Alert, oriented   Vital Signs   Visit Vitals  BP (!) 145/68   Pulse 78   Temp 98.1 °F (36.7 °C)   Resp 18   Ht 5' 6\" (1.676 m)   Wt 61.2 kg (134 lb 14.7 oz)   SpO2 96%   BMI 21.78 kg/m²     Laboratory  Recent Labs     06/22/22  0259   WBC 14.1*     Factors impacting BG management  Factor Dose Comments   Nutrition:   45 gm carb meal    Infection Cefoxitin (completed)  Afebrile.  Elevated WBC   Other:   Kidney function   Normal      Blood glucose pattern      Significant diabetes-related events over the past 24-72 hours  Admission . GFR >60  6/15/22 DISTAL PANCREATECTOMY  On corrective insulin => used 28 units of total insulin post-operatively to bring BG to target  6/17/22 Used 14 units of corrective insulin yesterday    Assessment and Plan   Nursing Diagnosis Risk for unstable blood glucose pattern   Nursing Intervention Domain 7458 Decision-making Support   Nursing Interventions Examined current inpatient diabetes/blood glucose control   Explored factors facilitating and impeding inpatient management  Explored corrective strategies with patient and responsible inpatient provider   Informed patient of rational for insulin strategy while hospitalized     Evaluation   This thin  female was admitted for surgical intervention and underwent removal of pancreatic mass. Consulted by surgeon to address BG management. Patient was treated for Type 2 diabetes with oral agents PTA. Patient was nauseous yesterday. According to OP notes, patient had a gap in primary care so I'm curious whether she had up-to-date prescriptions. Upon admission, BG elevated in the 200s so her pancreatic function was questionable before the surgery and will likely diminish as a result of the amount of pancreatic beta cell tissue that was removed. It took 28 units of insulin after surgery to bring BGs to target. Used 14 units of corrective insulin yesterday 6/16/22. Again, would recommend starting a basal dose of insulin. Some info copied from Amy Chase, CNS note    6/20/22 BG's elevated today. The patient is using correction insulin only. I recommend a scheduled basal insulin dose. She used a total of 18 units correction insulin yesterday. Consider 0.3 x kg ( 18 units Lantus daily). 6/21/22  The morning BG was above goal this more. However, the patient was started on on scheduled basal dose of 18 units Lantus today. Diabetes management will continue monitor BG trends and make recommendations as needed. Insulin pen administration demonstration provided. The patient was proficient. The patient was able to verbalize the steps on glucometer use. Hyperglycemia, hypoglycemia and sick day management discussed. The patient verbalizes understanding. ADA booklet provided. The patient's brother was also present. 6/22/22 Morning BG above goal today, but is stable by late morning on 18 units Lantus. I recommend continuing on current diabetes medication regimen. I suspect the patient may require more at some point. I recommend the patient to follow-up with PCP or endocrinologist for future diabetes medication management. Insulin pen administration provided again to today to reinforce prior day learning.    6/23/22 Morning BG slightly above goal. The patient used 18 units Lantus yesterday. I think the patient may require a higher dose at some point, but I recommend discharging the patient on the current dose. The patient should follow up with her PCP or endocrinologist for future insulin dosing changes. Discussed this plan with the patient and she is amenable. Insulin pen administration provided again to today to reinforce prior day learning. She is proficient. Discharge Recommendations     1. 18 units ( 0.3 x kg) daily  2. Monitor BGs  3. Follow-up with PCP or endocrinologist for future                            diabetes managemennt  4.  Twice yearly A1c's    Billing Code(s)   [] 32832 IP subsequent hospital care - 35 minutes [] 34679 Prolonged Services - 65 minutes [] 92233 Prolonged Services - 110 minutes  [] 83112 IP subsequent hospital care - 25 minutes [] 45973 Prolonged Services - 55 minutes [] 75447 Prolonged Services - 100 minutes  [x] 97499 IP subsequent hospital care - 15 minutes [] 11825 Prolonged Services - 45 minutes [] 36529 Prolonged Services - 90 minutes    Before making these care recommendations, I personally reviewed the hospitalization record, including notes, laboratory & diagnostic data and current medications, and examined the patient at the bedside (circumstances permitting) before making care recommendations. More than fifty (50) percent of the time was spent in patient counseling and/or care coordination.   Total minutes: 75 GASPER Boss  Diabetes Clinical Nurse Specialist  [facility]  Access via [address] [place] [address]

## 2022-06-29 ENCOUNTER — TELEPHONE (OUTPATIENT)
Dept: ONCOLOGY | Age: 72
End: 2022-06-29

## 2022-06-29 NOTE — TELEPHONE ENCOUNTER
Phone call to patient to review follow up appointment information, ensure patient is connected with Dr. Ananth Salas office. No Answer. LVM with my contact information.

## 2022-06-30 ENCOUNTER — TELEPHONE (OUTPATIENT)
Dept: ONCOLOGY | Age: 72
End: 2022-06-30

## 2022-07-05 ENCOUNTER — OFFICE VISIT (OUTPATIENT)
Dept: SURGERY | Age: 72
End: 2022-07-05
Payer: MEDICARE

## 2022-07-05 VITALS
OXYGEN SATURATION: 96 % | HEART RATE: 100 BPM | WEIGHT: 118.4 LBS | BODY MASS INDEX: 19.03 KG/M2 | SYSTOLIC BLOOD PRESSURE: 105 MMHG | DIASTOLIC BLOOD PRESSURE: 67 MMHG | HEIGHT: 66 IN | RESPIRATION RATE: 16 BRPM | TEMPERATURE: 98.1 F

## 2022-07-05 DIAGNOSIS — Z09 POSTOPERATIVE EXAMINATION: Primary | ICD-10-CM

## 2022-07-05 PROCEDURE — 99024 POSTOP FOLLOW-UP VISIT: CPT | Performed by: SURGERY

## 2022-07-05 NOTE — PROGRESS NOTES
Identified pt with two pt identifiers(name and ). Reviewed record in preparation for visit and have obtained necessary documentation. All patient medications has been reviewed. Chief Complaint   Patient presents with    Surgical Follow-up     s/p Diagnostic laparoscopy followed by open distal pancreatectomy and splenectomy, takedown of splenic flexureon 6/15/22       Health Maintenance Due   Topic    Hepatitis C Screening     Lipid Screen     DTaP/Tdap/Td series (1 - Tdap)    Colorectal Cancer Screening Combo     Shingrix Vaccine Age 50> (1 of 2)    Breast Cancer Screen Mammogram     Bone Densitometry (Dexa) Screening     Medicare Yearly Exam     COVID-19 Vaccine (2 - Pfizer 3-dose series)       Vitals:    22 1256   BP: 105/67   Pulse: 100   Resp: 16   Temp: 98.1 °F (36.7 °C)   TempSrc: Temporal   SpO2: 96%   Weight: 53.7 kg (118 lb 6.4 oz)   Height: 5' 6\" (1.676 m)   PainSc:   4   PainLoc: Abdomen       4. Have you been to the ER, urgent care clinic since your last visit? Hospitalized since your last visit? No    5. Have you seen or consulted any other health care providers outside of the [address] [place] [address] since your last visit? Include any pap smears or colon screening. No      Patient is accompanied by self I have received verbal consent from Debbie Gr to discuss any/all medical information while they are present in the room.

## 2022-07-05 NOTE — Clinical Note
7/5/2022    Patient: Kristin Calderon   YOB: 1950   Date of Visit: 7/5/2022     Susu Anthony MD  [address] [address] [address]  Via Fax: [phone]    Dear Ssuu Anthony MD,      Thank you for referring Ms. Tiffanie Mehta to  [facility]  for evaluation. My notes for this consultation are attached. If you have questions, please do not hesitate to call me. I look forward to following your patient along with you.       Sincerely,    Denis Kowalski MD

## 2022-07-05 NOTE — PROGRESS NOTES
Status post distal pancreatectomy and splenectomy for adenocarcinoma of the pancreas 6/15/2022. Only 3 lymph nodes, but they were all negative. Margins were clear. She tells me that she has not been eating much she says that food just is not tasting good. She is not having any trouble swallowing and she is not having any nausea or vomiting but she just has no appetite. Her weight is down to 118 from her baseline of between 135 and 140 pounds. She is feeling weak today. She says she is having good days and bad days in terms of energy level. She says she is drinking enough fluids and is urinating normally. Blood sugars have been running 140s to 150s on her metformin only. She has not been using the insulin. She has not followed up with the diabetes treatment team yet. On exam, there is no skin tenting. She appears a bit pale but her sclera have normal visible vessels. Lungs are clear to auscultation and she has a regular rhythm. Abdomen is nontender and her incision is well-healed. Assessment and plan: Well-healed but with poor appetite. I removed her staples today. I brought her brother back to the room and the 3 of us together discussed the need to think of food as medicine for now. I encouraged her to drink a protein shake with every meal and for snacks. I told her not to worry about the sugar and the protein shakes for now. The acute problem is her weight loss. We discussed increasing her protein intake and went over examples of protein for each meal of the day. I encouraged her to start a multivitamin. I will see her back in 2 weeks to track her progress. She has an appointment with Dr. Tamra Gutierrez tomorrow and I encouraged her to keep it.

## 2022-07-07 NOTE — PROGRESS NOTES
Physician Progress Note      PATIENT:               Monica Licona  CSN #:                  [id number]  :                       1950  ADMIT DATE:       6/15/2022 6:00 AM  DISCH DATE:        2022 6:17 PM  RESPONDING  PROVIDER #:        Violet Kirk MD          QUERY TEXT:    Patient admitted with pancreatic ca. If possible, please document in progress notes and discharge summary if you are evaluating and /or treating any of the following: The medical record reflects the following:  Risk Factors: pancreatic CA, CAD, DM, HTN  Clinical Indicators: Malnutrition Status:  Severe malnutrition (22 1608)  Context:  Chronic illness  Findings of the 6 clinical characteristics of malnutrition:  Energy Intake:  75% or less est energy requirements for 1 month or longer  Weight Loss:  Greater than 10% over 6 months  Body Fat Loss:  Mild body fat loss, Triceps,Fat overlying ribs  Muscle Mass Loss:  Mild muscle mass loss, Clavicles (pectoralis &deltoids),Scapula (trapezius)  Fluid Accumulation:  No significant fluid accumulation,  Treatment: RD cx, Ensure High Protein TID  Thanks,  Dom Gudino Rn/CDI     ASPEN Criteria:  https://aspenjournals. onlinelibrary. luna. com/doi/full/10.1177/2723147053340933  Options provided:  -- Protein calorie malnutrition severe  -- Other - I will add my own diagnosis  -- Disagree - Not applicable / Not valid  -- Disagree - Clinically unable to determine / Unknown  -- Refer to Clinical Documentation Reviewer    PROVIDER RESPONSE TEXT:    Provider is clinically unable to determine a response to this query.     Query created by: Shahab Diaz on 2022 10:25 AM      Electronically signed by:  Violet Kirk MD 2022 11:53 AM

## 2022-07-12 ENCOUNTER — TELEPHONE (OUTPATIENT)
Dept: ONCOLOGY | Age: 72
End: 2022-07-12

## 2022-07-12 NOTE — TELEPHONE ENCOUNTER
[address] [place] [facility] [address] [facility], [address] [facility]   W: [phone]  F: [phone]    Medical Nutrition Therapy  Nutrition Referral:    Referral received from . Emanuel Lnyn, nurse navigator regarding poor appetite. She is s/p open distal pancreatectomy and splenectomy. Called patient, no answer. Left a message, explaining that RD is available to address nutrition throughout the spectrum of care. Contact information provided.       Malnutrition Assessment:  Malnutrition Status:  Severe malnutrition (06/22/22 1608)    Context:  Chronic illness     Findings of the 6 clinical characteristics of malnutrition:   Energy Intake:  75% or less est energy requirements for 1 month or longer  Weight Loss:  Greater than 10% over 6 months     Body Fat Loss:  Mild body fat loss, Triceps,Fat overlying ribs   Muscle Mass Loss:  Mild muscle mass loss, Clavicles (pectoralis &deltoids),Scapula (trapezius)  Fluid Accumulation:  No significant fluid accumulation,     Strength:  Not performed     Wt Readings from Last 5 Encounters:   07/05/22 118 lb 6.4 oz (53.7 kg)   06/15/22 134 lb 14.7 oz (61.2 kg)   06/02/22 144 lb 10 oz (65.6 kg)   05/16/22 137 lb (62.1 kg)   04/25/22 137 lb 12.8 oz (62.5 kg)     Ht Readings from Last 1 Encounters:   07/05/22 5' 6\" (1.676 m)       Estimated Daily Nutrient Needs:  Energy Requirements Based On: Formula  [facility] Used for Energy Requirements: Current  Energy (kcal/day): MSJ 1500 (1139 x 1.3)  Weight Used for Protein Requirements: Current  Protein (g/day): 85g (1.4gPro/kg)  Method Used for Fluid Requirements: 1 ml/kcal  Fluid (ml/day): 1500mL     Nutrition Diagnosis:   · Severe malnutrition related to altered GI function,inadequate protein-energy intake as evidenced by weight loss greater than or equal to 10% in 6 months,poor intake prior to admission        Signed By: Anastasiya Belcher, [employer]

## 2022-07-22 ENCOUNTER — OFFICE VISIT (OUTPATIENT)
Dept: SURGERY | Age: 72
End: 2022-07-22
Payer: MEDICARE

## 2022-07-22 VITALS
HEART RATE: 94 BPM | TEMPERATURE: 98 F | WEIGHT: 114.4 LBS | DIASTOLIC BLOOD PRESSURE: 81 MMHG | SYSTOLIC BLOOD PRESSURE: 138 MMHG | OXYGEN SATURATION: 98 % | BODY MASS INDEX: 18.39 KG/M2 | HEIGHT: 66 IN | RESPIRATION RATE: 20 BRPM

## 2022-07-22 DIAGNOSIS — C25.9 PANCREATIC ADENOCARCINOMA (HCC): ICD-10-CM

## 2022-07-22 DIAGNOSIS — Z09 POSTOPERATIVE EXAMINATION: Primary | ICD-10-CM

## 2022-07-22 DIAGNOSIS — Z90.81 H/O SPLENECTOMY: ICD-10-CM

## 2022-07-22 PROCEDURE — 99024 POSTOP FOLLOW-UP VISIT: CPT | Performed by: SURGERY

## 2022-07-22 NOTE — PROGRESS NOTES
Identified pt with two pt identifiers(name and ). Reviewed record in preparation for visit and have obtained necessary documentation. All patient medications has been reviewed. Chief Complaint   Patient presents with    Surgical Follow-up     S/P Diagnostic laparoscopy followed by open distal pancreatectomy and splenectomy, takedown of splenic flexure. 6/15/22       Health Maintenance Due   Topic    Hepatitis C Screening     Lipid Screen     DTaP/Tdap/Td series (1 - Tdap)    Colorectal Cancer Screening Combo     Shingrix Vaccine Age 50> (1 of 2)    Breast Cancer Screen Mammogram     Bone Densitometry (Dexa) Screening     Medicare Yearly Exam     COVID-19 Vaccine (2 - Pfizer series)       Vitals:    22 0859   BP: 138/81   Pulse: 94   Resp: 20   Temp: 98 °F (36.7 °C)   SpO2: 98%   Weight: 51.9 kg (114 lb 6.4 oz)   Height: 5' 6\" (1.676 m)   PainSc:   3   PainLoc: Abdomen       4. Have you been to the ER, urgent care clinic since your last visit? Hospitalized since your last visit? No    5. Have you seen or consulted any other health care providers outside of the [address] [place] [address] since your last visit? Include any pap smears or colon screening. No      Patient is accompanied by self I have received verbal consent from Vinita Mcpherson to discuss any/all medical information while they are present in the room.

## 2022-07-23 NOTE — PROGRESS NOTES
Status post distal pancreatectomy and splenectomy for adenocarcinoma of the pancreas 6/15/2022 3 lymph nodes negative for metastasis margins were clear. She is doing better in terms of eating. Her weight has now stabilized although she is not gaining weight back yet. She is taking in about 4 protein shakes per day and her diet is increasing. On exam her incision is well-healed. Assessment and plan: Status post above. She appears to have stabilized in terms of weight loss and her appetite is improving although slowly. I recommended that she have a postop follow-up with Dr. Dennis Cervantes and we will get her referred to infectious disease for her post splenectomy shots. She did receive her first series in the hospital.  She did miss her postop appointment with Dr. Loli Tucker because she was not feeling well. I told her to be sure to reschedule this. No further follow-up required in our office but she was told to give a call for any concerns.

## 2022-08-04 ENCOUNTER — NURSE NAVIGATOR (OUTPATIENT)
Dept: ONCOLOGY | Age: 72
End: 2022-08-04

## 2022-08-04 NOTE — PROGRESS NOTES
Returns to discuss possible distal pancreatectomy and splenectomy. H&P unchanged. Options, risks, benefits discussed. After extensive discussion, the patient wishes to proceed. Total time spent 15 min,  >50% in counseling.       Alyx Golden MD

## 2022-08-04 NOTE — PROGRESS NOTES
Chart check - follow up for pancreatic cancer patient s/p surgical resection. Has not had follow up with medical oncology  Surgeon discussed at patient's visit end of July and encouraged patient to call medical oncologist  No upcoming appointments indicated in connect care. I reached out to the patient today and LVM. Sent letter to patient to remind about follow up with Medical oncology    Genetic/Genomic testing part of [facility] for pancreas.   Will send note to Mds to consider discussing with patient

## 2023-08-02 NOTE — Clinical Note
7/23/2022    Patient: Miranda Wu   YOB: 1950   Date of Visit: 7/22/2022     González Mahmood MD  [address] [address] [address]  Via Fax: [phone]    Dear González Mahmood MD,      Thank you for referring Ms. Paco Call to [address] [place] [address] for evaluation. My notes for this consultation are attached. If you have questions, please do not hesitate to call me. I look forward to following your patient along with you.       Sincerely,    Riaz Becerril MD Patient requests all Lab, Cardiology, and Radiology Results on their Discharge Instructions

## (undated) DEVICE — NEEDLE HYPO 25GA L5/8IN ORNG HUB S STL LATCH BVL UP

## (undated) DEVICE — SYR 10ML LUER LOK 1/5ML GRAD --

## (undated) DEVICE — NEONATAL-ADULT SPO2 SENSOR: Brand: NELLCOR

## (undated) DEVICE — SYR 3ML LL TIP 1/10ML GRAD --

## (undated) DEVICE — DRAIN SURG W10MMXL20CM SIL FULL PERF HUBLESS FLAT RADPQ

## (undated) DEVICE — SYSTEM BX DIA22GA FN W/ PRE LD NDL SHARKCORE

## (undated) DEVICE — BASIN EMSIS 16OZ GRAPHITE PLAS KID SHP MOLD GRAD FOR ORAL

## (undated) DEVICE — SYR IRR BLB 2OZ DISP BLU STRL -- CONVERT TO ITEM 357637

## (undated) DEVICE — HYPODERMIC SAFETY NEEDLE: Brand: MAGELLAN

## (undated) DEVICE — SUT PROL 7-0 18IN BV1 DA BLU --

## (undated) DEVICE — CATH IV AUTOGRD BC PNK 20GA 25 -- INSYTE

## (undated) DEVICE — SUTURE MCRYL SZ 4-0 L27IN ABSRB UD L19MM PS-2 1/2 CIR PRIM Y426H

## (undated) DEVICE — SUT SLK 2-0SH 30IN BLK --

## (undated) DEVICE — SOLUTION IRRIG 1000ML 0.9% SOD CHL USP POUR PLAS BTL

## (undated) DEVICE — SUT ETHLN 3-0 18IN PS1 BLK --

## (undated) DEVICE — HANDLE LT SNAP ON ULT DURABLE LENS FOR TRUMPF ALC DISPOSABLE

## (undated) DEVICE — SUTURE VCRL SZ 3-0 L27IN ABSRB UD L36MM CT-1 1/2 CIR J258H

## (undated) DEVICE — SUTURE PERMAHAND SZ 3-0 L30IN NONABSORBABLE BLK SILK BRAID A304H

## (undated) DEVICE — AGENT HEMSTAT W4XL4IN OXIDIZED REGENERATED CELOS ABSRB SFT

## (undated) DEVICE — VASCULAR-MRMC: Brand: MEDLINE INDUSTRIES, INC.

## (undated) DEVICE — Device: Brand: JELCO

## (undated) DEVICE — BLADE ELECTRODE: Brand: EDGE

## (undated) DEVICE — YANKAUER,BULB TIP,W/O VENT,RIGID,STERILE: Brand: MEDLINE

## (undated) DEVICE — SOLUTION IV 500ML 0.9% SOD CHL FLX CONT

## (undated) DEVICE — YANKAUER,TAPERED BULBOUS TIP,W/O VENT: Brand: MEDLINE

## (undated) DEVICE — Device

## (undated) DEVICE — PAD,ABDOMINAL,5"X9",ST,LF,25/BX: Brand: MEDLINE INDUSTRIES, INC.

## (undated) DEVICE — REM POLYHESIVE ADULT PATIENT RETURN ELECTRODE: Brand: VALLEYLAB

## (undated) DEVICE — SUT SLK 3-0 30IN SH BLK --

## (undated) DEVICE — SEALANT TISS 4ML FIBRIN PREFIL SYR PRIMA FRZN W/ 1 DUPLOJET

## (undated) DEVICE — SHEAR RMFG HARMONIC FOCUS 9CM -- OEM ITEM L#322125

## (undated) DEVICE — BLADE OPHTH 180DEG CUT SURF BLU STR SHRP DBL BVL GRINDLESS

## (undated) DEVICE — SUTURE PROL SZ 5-0 L30IN NONABSORBABLE BLU L13MM RB-2 1/2 8710H

## (undated) DEVICE — SUTURE ETHLN SZ 2-0 L30IN NONABSORBABLE BLK L36MM PSLX 3/8 1697H

## (undated) DEVICE — SUTURE VCRL SZ 3-0 L27IN ABSRB UD L26MM SH 1/2 CIR J416H

## (undated) DEVICE — SPONGE LAP 18X18IN STRL -- 5/PK

## (undated) DEVICE — Z DISCONTINUED USE 2131664 WIPE INSTR W3XL3IN NONLINTING

## (undated) DEVICE — VASCULAR-RICHMOND-LF: Brand: MEDLINE INDUSTRIES, INC.

## (undated) DEVICE — SUT PROL 6-0 24IN BV1 DA BLU --

## (undated) DEVICE — DRAIN SURG 19FR L025IN DIA63MM SIL CHN RND FULL FLUT CLS

## (undated) DEVICE — GLOVE SURG SZ 8 CRM LTX FREE POLYISOPRENE POLYMER BEAD ANTI

## (undated) DEVICE — SOLIDIFIER FLD 2OZ 1500CC N DISINF IN BTL DISP SAFESORB

## (undated) DEVICE — COVER,TABLE,HEAVY DUTY,77"X90",STRL: Brand: MEDLINE

## (undated) DEVICE — SYRINGE IRRIG 60ML SFT PLIABLE BLB EZ TO GRP 1 HND USE W/

## (undated) DEVICE — SOLUTION IV 1000ML 0.9% SOD CHL

## (undated) DEVICE — SUTURE PERMAHAND SZ 2-0 L30IN NONABSORBABLE BLK SH L26MM C016D

## (undated) DEVICE — Z DISCONTINUED PER MEDLINE LINE GAS SAMPLING O2/CO2 LNG AD 13 FT NSL W/ TBNG FILTERLINE

## (undated) DEVICE — YANKAUER,POOLE TIP,STERILE,50/CS: Brand: MEDLINE

## (undated) DEVICE — CANISTER, RIGID, 3000CC: Brand: MEDLINE INDUSTRIES, INC.

## (undated) DEVICE — BLADE ASSEMB CLP HAIR FINE --

## (undated) DEVICE — CAROTID ARTERY SHUNT KIT,RADIOPAQUE LINE, STRAIGHT: Brand: ARGYLE

## (undated) DEVICE — SUTURE PERMAHAND SZ 2-0 L30IN NONABSORBABLE BLK SILK W/O A305H

## (undated) DEVICE — ENDOSCOPIC ULTRASOUND FINE NEEDLE BIOPSY (FNB) DEVICE: Brand: ACQUIRE

## (undated) DEVICE — SUTURE STRATAFIX SYMMETRIC SZ 1 L18IN ABSRB VLT CT1 L36CM SXPP1A404

## (undated) DEVICE — Device: Brand: BALLOON3

## (undated) DEVICE — RELOAD STPL H2X4.7XL60MM THCK TISS GRN B FRM AUTO RET PIN

## (undated) DEVICE — SYRINGE TB 1ML TRNSLUC BRL WHT PLUNG BLK MRK CONVENTIONAL

## (undated) DEVICE — LOOP,VESSEL,MAXI,BLUE,2/PK,STERILE: Brand: MEDLINE

## (undated) DEVICE — TUBING, SUCTION, 1/4" X 10', STRAIGHT: Brand: MEDLINE

## (undated) DEVICE — 3M™ DURAPORE™ SURGICAL TAPE 1538-1, 1 INCH X 10 YARD (2,5CM X 9,1M), 12 ROLLS/BOX: Brand: 3M™ DURAPORE™

## (undated) DEVICE — SPONGE GZ W4XL4IN COT 12 PLY TYP VII WVN C FLD DSGN

## (undated) DEVICE — PART NUMBER 108260, VTI 8 MHZ DISPOSABLE DOPPLER PROBE, STRAIGHT, BOX: Brand: VTI 8 MHZ DISPOSABLE DOPPLER PROBE, STRAIGHT, BOX

## (undated) DEVICE — NEEDLE HYPO 18GA L1.5IN PNK S STL HUB POLYPR SHLD REG BVL

## (undated) DEVICE — CLIP LIG M BLU TI HRT SHP WIRE HORZ 600 PER BX

## (undated) DEVICE — GOWN,SIRUS,NONRNF,SETINSLV,2XL,18/CS: Brand: MEDLINE

## (undated) DEVICE — BLOCK BITE ENDOSCP AD 21 MM W/ DIL BLU LF DISP

## (undated) DEVICE — SUTURE VCRL SZ 1 L18IN ABSRB VLT CT-1 L36MM 1/2 CIR J741D

## (undated) DEVICE — GLOVE SURG SZ 85 CRM LTX FREE POLYISOPRENE POLYMER BEAD ANTI

## (undated) DEVICE — GAUZE SPONGES,12 PLY: Brand: CURITY

## (undated) DEVICE — Device: Brand: SINGLE USE ASPIRATION NEEDLE

## (undated) DEVICE — SPONGE: SPECIALTY PEANUT XR 100/CS: Brand: MEDICAL ACTION INDUSTRIES

## (undated) DEVICE — INFECTION CONTROL KIT SYS

## (undated) DEVICE — MAGNETIC DRAPE: Brand: DEVON

## (undated) DEVICE — EXTRA LARGE, DISPOSABLE C-SECTION PROTECTOR - RETRACTOR: Brand: ALEXIS ® O C-SECTION PROTECTOR - RETRACTOR

## (undated) DEVICE — COTTON BALLS: Brand: DEROYAL

## (undated) DEVICE — SOLUTION IRRIG 1000ML STRL H2O USP PLAS POUR BTL

## (undated) DEVICE — SUTURE PERMAHAND SZ 3-0 L30IN NONABSORBABLE BLK L26MM SH C017D

## (undated) DEVICE — KIT,1200CC RIGID CANISTER,6' AND 20" TUB: Brand: MEDLINE INDUSTRIES, INC.

## (undated) DEVICE — TOWEL 4 PLY TISS 19X30 SUE WHT

## (undated) DEVICE — SOL INJ SOD CL 0.9% 500ML BG --

## (undated) DEVICE — SET ADMIN 16ML TBNG L100IN 2 Y INJ SITE IV PIGGY BK DISP

## (undated) DEVICE — STAPLER INT L60MM REG TISS BLU B FRM 8 FIRING 2 ROW AUTO

## (undated) DEVICE — SUTURE GORTX SZ 4-0 L24IN NONABSORBABLE L13MM PT-13 3/8 CIR 5K08A

## (undated) DEVICE — TOWEL SURG W17XL27IN STD BLU COT NONFENESTRATED PREWASHED

## (undated) DEVICE — DRAPE FLD WRM W44XL66IN C6L FOR INTRATEMP SYS THERMABASIN

## (undated) DEVICE — VESSEL LOOPS,MAXI, BLUE: Brand: DEVON

## (undated) DEVICE — DRESSING,GAUZE,XEROFORM,CURAD,5"X9",ST: Brand: CURAD

## (undated) DEVICE — (D)PREP SKN CHLRAPRP APPL 26ML -- CONVERT TO ITEM 371833

## (undated) DEVICE — DEVON™ KNEE AND BODY STRAP 60" X 3" (1.5 M X 7.6 CM): Brand: DEVON

## (undated) DEVICE — STERILE POLYISOPRENE POWDER-FREE SURGICAL GLOVES: Brand: PROTEXIS

## (undated) DEVICE — ELECTRODE,RADIOTRANSLUCENT,FOAM,5PK: Brand: MEDLINE